# Patient Record
Sex: MALE | Race: BLACK OR AFRICAN AMERICAN | NOT HISPANIC OR LATINO | Employment: FULL TIME | URBAN - METROPOLITAN AREA
[De-identification: names, ages, dates, MRNs, and addresses within clinical notes are randomized per-mention and may not be internally consistent; named-entity substitution may affect disease eponyms.]

---

## 2017-01-15 ENCOUNTER — HOSPITAL ENCOUNTER (EMERGENCY)
Facility: HOSPITAL | Age: 57
Discharge: HOME | End: 2017-01-15
Attending: EMERGENCY MEDICINE
Payer: COMMERCIAL

## 2017-01-15 VITALS
HEART RATE: 100 BPM | OXYGEN SATURATION: 99 % | SYSTOLIC BLOOD PRESSURE: 120 MMHG | HEIGHT: 66 IN | WEIGHT: 140 LBS | DIASTOLIC BLOOD PRESSURE: 92 MMHG | BODY MASS INDEX: 22.5 KG/M2 | RESPIRATION RATE: 16 BRPM | TEMPERATURE: 99 F

## 2017-01-15 DIAGNOSIS — S39.012A LUMBAR STRAIN, INITIAL ENCOUNTER: Primary | ICD-10-CM

## 2017-01-15 PROCEDURE — 99283 EMERGENCY DEPT VISIT LOW MDM: CPT

## 2017-01-15 RX ORDER — OXYCODONE AND ACETAMINOPHEN 5; 325 MG/1; MG/1
1 TABLET ORAL EVERY 4 HOURS PRN
Qty: 12 TABLET | Refills: 0 | Status: ON HOLD | OUTPATIENT
Start: 2017-01-15 | End: 2018-09-24 | Stop reason: HOSPADM

## 2017-01-15 RX ORDER — LIDOCAINE 50 MG/G
1 PATCH TOPICAL DAILY
Qty: 15 PATCH | Refills: 0 | Status: SHIPPED | OUTPATIENT
Start: 2017-01-15 | End: 2018-10-19 | Stop reason: ALTCHOICE

## 2017-01-15 RX ORDER — METHOCARBAMOL 750 MG/1
1500 TABLET, FILM COATED ORAL 3 TIMES DAILY
Qty: 30 TABLET | Refills: 0 | Status: SHIPPED | OUTPATIENT
Start: 2017-01-15 | End: 2017-01-25

## 2017-01-15 NOTE — ED TRIAGE NOTES
Heavy lifting at work on Thursday. Has had lower back pain since.  Difficulty with movement, increases pain

## 2017-01-15 NOTE — ED AVS SNAPSHOT
[facility]  [place] [address]               Duran Mccauley   1/15/2017 12:43 PM   ED    Description:  Male : 1960   Department:  [facility]           Your Care was Coordinated By:     Provider Role From To    Cesar Villa MD Attending Provider 01/15/17 1248 --      Reason for Visit     Back Pain           Diagnoses this Visit        Comments    Lumbar strain, initial encounter    -  Primary       ED Disposition     ED Disposition Condition Comment    Discharge             To Do List           Follow-up Information     Follow up with Patrick Cook MD In 1 week(s).    Specialty:  Internal Medicine    Contact information:    [address] [facility] [address]  [phone]         These Medications        Disp Refills Start End    methocarbamol (ROBAXIN) 750 MG Tab 30 tablet 0 1/15/2017 2017    Take 2 tablets (1,500 mg total) by mouth 3 (three) times daily. - Oral    Pharmacy: [facility] [address] [place], [place] [address] - [facility] Ph #: [phone]       oxycodone-acetaminophen (PERCOCET) 5-325 mg per tablet 12 tablet 0 1/15/2017     Take 1 tablet by mouth every 4 (four) hours as needed for Pain. - Oral    Pharmacy: [facility] [address] [place], [place] [address] - [facility] Ph #: [phone]       lidocaine (LIDODERM) 5 % 15 patch 0 1/15/2017     Place 1 patch onto the skin once daily. Remove & Discard patch within 12 hours or as directed by MD - Transdermal    Pharmacy: [facility] [address] [place], [place] [address] - [address] [employer] [facility] Ph #: [phone]         [facility]     [facility] -  Assistance  Registered nurses in the [facility] provide clinical advisement, health education, appointment booking, and other advisory services.  Call for this free  "service at [phone].             Medications           Message regarding Medications     Verify the changes and/or additions to your medication regime listed below are the same as discussed with your clinician today.  If any of these changes or additions are incorrect, please notify your healthcare provider.        START taking these NEW medications        Refills    methocarbamol (ROBAXIN) 750 MG Tab 0    Sig: Take 2 tablets (1,500 mg total) by mouth 3 (three) times daily.    Class: Print    Route: Oral    oxycodone-acetaminophen (PERCOCET) 5-325 mg per tablet 0    Sig: Take 1 tablet by mouth every 4 (four) hours as needed for Pain.    Class: Print    Route: Oral    lidocaine (LIDODERM) 5 % 0    Sig: Place 1 patch onto the skin once daily. Remove & Discard patch within 12 hours or as directed by MD    Class: Print    Route: Transdermal           Verify that the below list of medications is an accurate representation of the medications you are currently taking.  If none reported, the list may be blank. If incorrect, please contact your healthcare provider. Carry this list with you in case of emergency.           Current Medications     atorvastatin (LIPITOR) 20 MG tablet Take 1 tablet (20 mg total) by mouth once daily.    lisinopril-hydrochlorothiazide (PRINZIDE,ZESTORETIC) 20-12.5 mg per tablet Take 1 tablet by mouth once daily.    lidocaine (LIDODERM) 5 % Place 1 patch onto the skin once daily. Remove & Discard patch within 12 hours or as directed by MD    methocarbamol (ROBAXIN) 750 MG Tab Take 2 tablets (1,500 mg total) by mouth 3 (three) times daily.    oxycodone-acetaminophen (PERCOCET) 5-325 mg per tablet Take 1 tablet by mouth every 4 (four) hours as needed for Pain.           Clinical Reference Information           Your Vitals Were     BP Pulse Temp Resp Height Weight    120/92 (BP Location: Left arm, Patient Position: Sitting) 100 98.5 °F (36.9 °C) (Oral) 16 5' 6" (1.676 m) 63.5 kg (140 lb)    SpO2 " BMI             99% 22.6 kg/m2         Allergies as of 1/15/2017     No Known Allergies      Immunizations Administered on Date of Encounter - 1/15/2017     None      ED Micro, Lab, POCT     None      ED Imaging Orders     None        Discharge Instructions           Back Sprain or Strain    Injury to the muscles (strain) or ligaments (sprain) around the spine can be troubling. Injury may occur after a sudden forceful twisting or bending force such as in a car accident, after a simple awkward movement, or after lifting something heavy with poor body positioning. In any case, muscle spasm is often present and adds to the pain.  Thankfully, most people feel better in 1 to 2 weeks, and most of the rest in 1 to 2 months. Most people can remain active. Unless you had a forceful or traumatic physical injury such as a car accident or fall, X-rays may not be ordered for the first evaluation of a back sprain or strain. If pain continues and does not respond to medical treatment, your healthcare provider may then order X-rays and other tests.  Home care  The following guidelines will help you care for your injury at home:  · When in bed, try to find a comfortable position. A firm mattress is best. Try lying flat on your back with pillows under your knees. You can also try lying on your side with your knees bent up toward your chest and a pillow between your knees.  · Don't sit for long periods. Try not to take long car rides or take other trips that have you sitting for a long time. This puts more stress on the lower back than standing or walking.  · During the first 24 to 72 hours after an injury or flare-up, apply an ice pack to the painful area for 20 minutes. Then remove it for 20 minutes. Do this for 60 to 90 minutes, or several times a day. This will reduce swelling and pain. Be sure to wrap the ice pack in a thin towel or plastic to protect your skin.  · You can start with ice, then switch to heat. Heat from a hot  shower, hot bath, or heating pad reduces pain and works well for muscle spasms. Put heat on the painful area for 20 minutes, then remove for 20 minutes. Do this for 60 to 90 minutes, or several times a day. Do not use a heating pad while sleeping. It can burn the skin.  · You can alternate the ice and heat. Talk with your healthcare provider to find out the best treatment or therapy for your back pain.  · Therapeutic massage will help relax the back muscles without stretching them.  · Be aware of safe lifting methods. Do not lift anything over 15 pounds until all of the pain is gone.  Medicines  Talk to your healthcare provider before using medicines, especially if you have other health problems or are taking other medicines.  · You may use acetaminophen or ibuprofen to control pain, unless another pain medicine was prescribed. If you have chronic conditions like diabetes, liver or kidney disease, stomach ulcers, or gastrointestinal bleeding, or are taking blood-thinner medicines, talk with your doctor before taking any medicines.  · Be careful if you are given prescription medicines, narcotics, or medicine for muscle spasm. They can cause drowsiness, and affect your coordination, reflexes, and judgment. Do not drive or operate heavy machinery when taking these types of medicines. Only take pain medicine as prescribed by your healthcare provider.  Follow-up care  Follow up with your healthcare provider, or as advised. You may need physical therapy or more tests if your symptoms get worse.  If you had X-rays your healthcare provider may be checking for any broken bones, breaks, or fractures. Bruises and sprains can sometimes hurt as much as a fracture. These injuries can take time to heal completely. If your symptoms dont improve or they get worse, talk with your healthcare provider. You may need a repeat X-ray or other tests.  Call 911  Call for emergency care if any of the following occur:  · Trouble  breathing  · Confused  · Very drowsy or trouble awakening  · Fainting or loss of consciousness  · Rapid or very slow heart rate  · Loss of bowel or bladder control  When to seek medical advice  Call your healthcare provider right away if any of the following occur:  · Pain gets worse or spreads to your arms or legs  · Weakness or numbness in one or both arms or legs  · Numbness in the groin or genital area  © 5596-0543 [employer] [address] [place] [address], [place] [address]. All rights reserved. This information is not intended as a substitute for professional medical care. Always follow your healthcare professional's instructions.          Self-Care for Low Back Pain    Most people have low back pain now and then. In many cases, it isnt serious and self-care can help. Sometimes low back pain can be a sign of a bigger problem. Call your healthcare provider if your pain returns often or gets worse over time. For the long-term care of your back, get regular exercise, lose any excess weight and learn good posture.  Take a short rest  Lying down during the day may be beneficial for short periods of time if severe pain increases with sitting or standing. Long-term bed rest could be detrimental.  Reduce pain and swelling  Cold reduces swelling. Both cold and heat can reduce pain. Protect your skin by placing a towel between your body and the ice or heat source.  · For the first few days, apply an ice pack for 15 to 20 minutes .  · After the first few days, try heat for 15 minutes at a time to ease pain. Never sleep on a heating pad.  · Over-the-counter medicine can help control pain and swelling. Try aspirin or ibuprofen.  Exercise  Exercise can help your back heal. It also helps your back get stronger and more flexible, preventing any reinjury. Ask your healthcare provider about specific exercises for your back.  Use good posture to avoid reinjury  · When moving, bend at the hips and knees. Dont bend at  the waist or twist around.  · When lifting, keep the object close to your body. Dont try to lift more than you can handle.  · When sitting, keep your lower back supported. Use a rolled-up towel as needed.  Seek immediate medical care if:  · Youre unable to stand or walk.  · You have a temperature over 100.4°F (38.0°C)  · You have frequent, painful, or bloody urination.  · You have severe abdominal pain.  · You have a sharp, stabbing pain.  · Your pain is constant.  · You have pain or numbness in your leg.  · You feel pain in a new area of your back.  · You notice that the pain isnt decreasing after more than a week.   © 7218-8736 [employer]. [address] [place] [address], [place] [address]. All rights reserved. This information is not intended as a substitute for professional medical care. Always follow your healthcare professional's instructions.           [facility] complies with applicable Federal civil rights laws and does not discriminate on the basis of race, color, national origin, age, disability, or sex.        Language Assistance Services     ATTENTION: Language assistance services are available, free of charge. Please call [phone].      ATENCIÓN: Si habla rejiañol, tiene a ambriz disposición servicios gratuitos de asistencia lingüística. Llame al [phone].     LEONORA Ý: N?u b?n nói Ti?ng Vi?t, có các d?ch v? h? tr? ngôn ng? mi?n phí dành cho b?n. G?i s? [phone].

## 2017-01-15 NOTE — ED PROVIDER NOTES
Encounter Date: 1/15/2017    SCRIBE #1 NOTE: I, Kehinde Oden, am scribing for, and in the presence of,  Larry Villa MD. I have scribed the following portions of the note - Other sections scribed: ROS, HPI.       History     Chief Complaint   Patient presents with    Back Pain     lower back pain onset thursday, lifting heavy objects on Thurs (wore back brace), denies hx of back problems     Review of patient's allergies indicates:  No Known Allergies  HPI Comments: CC: Back pain    HPI: This 56 y.o. M, who has a past medical history of Eczema; Hepatitis B; Hyperlipidemia; and Hypertension, presents to the ED for evaluation of gradually developing lower back pain with associated lower back stiffness x3 days. Pain is 10/10 and worse with movement. He has not been getting relief with Ibuprofen. He reports he was lifting heavy flower pots and bags of concrete at work 4 days ago. He is concerned this caused his symptoms. He denies incontinence, numbness, weakness, nausea, vomiting, fever and neck pain.  The history is provided by the patient.     Past Medical History   Diagnosis Date    Eczema     Hepatitis B      treated 2010s and negative RNA load afterward    Hyperlipidemia     Hypertension      No past medical history pertinent negatives.  Past Surgical History   Procedure Laterality Date    No past surgeries       Family History   Problem Relation Age of Onset    Heart disease Father     Diabetes Brother      Social History   Substance Use Topics    Smoking status: Never Smoker    Smokeless tobacco: None    Alcohol use 0.6 oz/week     1 Cans of beer per week     Review of Systems   Constitutional: Negative for chills, diaphoresis, fatigue, fever and unexpected weight change.   Respiratory: Negative for chest tightness.    Cardiovascular: Negative for chest pain.   Gastrointestinal: Negative for abdominal pain, nausea and vomiting.        (-) Bowel incontinence   Genitourinary: Negative for  enuresis.   Musculoskeletal: Positive for back pain (lower; with associated stiffness). Negative for neck pain.   Neurological: Negative for speech difficulty, weakness, numbness and headaches.   Hematological: Does not bruise/bleed easily.       Physical Exam   Initial Vitals   BP Pulse Resp Temp SpO2   01/15/17 1139 01/15/17 1139 01/15/17 1139 01/15/17 1139 01/15/17 1139   120/92 100 16 98.5 °F (36.9 °C) 99 %     Physical Exam    Vitals reviewed.  Constitutional: He appears well-developed and well-nourished.   HENT:   Head: Normocephalic and atraumatic.   Eyes: EOM are normal. Pupils are equal, round, and reactive to light.   Neck: Normal range of motion. Neck supple.   Cardiovascular: Normal rate, regular rhythm, normal heart sounds and intact distal pulses.   Pulmonary/Chest: Breath sounds normal. No respiratory distress. He has no wheezes. He has no rhonchi. He has no rales.   Abdominal: Soft. Bowel sounds are normal.   Musculoskeletal:        Lumbar back: He exhibits decreased range of motion, tenderness and pain. He exhibits no bony tenderness and no swelling.   Positive straight leg raise on the left at around 30°.  Negative on the right.  Mild tenderness to the paraspinous muscles on the left-hand side.   Neurological: He is alert and oriented to person, place, and time. No cranial nerve deficit or sensory deficit. GCS eye subscore is 4. GCS verbal subscore is 5. GCS motor subscore is 6.   No motor sensory deficits.  No saddle anesthesia.   Skin: Skin is warm and dry.   Psychiatric: He has a normal mood and affect.         ED Course   Procedures  Labs Reviewed - No data to display         Medical decision-making:    The patient received a medical screening exam. If performed, the EKG was independently evaluated by me and is pending final cardiology evaluation.  If performed, all radiographic studies were independently evaluated by me and are pending final radiology evaluation. If labs were ordered, they  were reviewed. Vital signs are independently assessed by me.  If performed, the pulse oximetry was independently evaluated by me.  I decided to obtain the patient's past medical record.  If available, I reviewed the patient's past medical record, including most recent labs and radiology reports.    I decided to obtain and review the old medical record.  The is an emergent evaluation for a patient with low back pain. The patient has no history of major trauma.  The patients age is not greater than 70 or less than 20 years old. I do not suspect an infectious, cancerous, or vascular etiology as the cause of the low back pain. The patient does not have a history of cancer or unexplained weight loss suggesting metastatic disease.  The patient does not have persistent fevers or night sweats. The patient is not immunocompromised and the patient has not had any chronic steroid use or intravenous drug use.  I do not think this patient has an epidural abscess, osteomyelitis, or metastatic spine lesions.  Pt does not have a history of  aortic aneurysm and I do not think there is evidence of retroperitoneal rupture.  The patient has a normal neurological exam and does not show evidence of cord or root compression.  The patient does not exhibit signs of urinary retention, urinary incontinence, bowel incontinence, or saddle anesthesia.  There is no evidence of cauda equine syndrome.     I do not think emergent radiography with X-rays or CT scan is warranted at this  time. Outpatient imaging can be obtained at the discretion of the primary care Physician.    I will offer this patient symptomatic treatment, reassurance, and education.      My physical exam findings were reviewed with the patient. Pt agrees with assessment, disposition and treatment plan and has no further questions or complaints at this time.    RORY Villa M.D. 1:01 PM 1/15/2017                   Jean Attestation:   Nahidibehsan #1: I performed the above  scribed service and the documentation accurately describes the services I performed. I attest to the accuracy of the note.    Attending Attestation:           Physician Attestation for Scribe:  Physician Attestation Statement for Scribe #1: I, Larry Villa MD, reviewed documentation, as scribed by Kehinde Oden in my presence, and it is both accurate and complete.                 ED Course     Clinical Impression:   The encounter diagnosis was Lumbar strain, initial encounter.          Cesar Villa MD  01/15/17 9427

## 2017-01-15 NOTE — DISCHARGE INSTRUCTIONS
Back Sprain or Strain    Injury to the muscles (strain) or ligaments (sprain) around the spine can be troubling. Injury may occur after a sudden forceful twisting or bending force such as in a car accident, after a simple awkward movement, or after lifting something heavy with poor body positioning. In any case, muscle spasm is often present and adds to the pain.  Thankfully, most people feel better in 1 to 2 weeks, and most of the rest in 1 to 2 months. Most people can remain active. Unless you had a forceful or traumatic physical injury such as a car accident or fall, X-rays may not be ordered for the first evaluation of a back sprain or strain. If pain continues and does not respond to medical treatment, your healthcare provider may then order X-rays and other tests.  Home care  The following guidelines will help you care for your injury at home:  · When in bed, try to find a comfortable position. A firm mattress is best. Try lying flat on your back with pillows under your knees. You can also try lying on your side with your knees bent up toward your chest and a pillow between your knees.  · Don't sit for long periods. Try not to take long car rides or take other trips that have you sitting for a long time. This puts more stress on the lower back than standing or walking.  · During the first 24 to 72 hours after an injury or flare-up, apply an ice pack to the painful area for 20 minutes. Then remove it for 20 minutes. Do this for 60 to 90 minutes, or several times a day. This will reduce swelling and pain. Be sure to wrap the ice pack in a thin towel or plastic to protect your skin.  · You can start with ice, then switch to heat. Heat from a hot shower, hot bath, or heating pad reduces pain and works well for muscle spasms. Put heat on the painful area for 20 minutes, then remove for 20 minutes. Do this for 60 to 90 minutes, or several times a day. Do not use a heating pad while sleeping. It can burn the  skin.  · You can alternate the ice and heat. Talk with your healthcare provider to find out the best treatment or therapy for your back pain.  · Therapeutic massage will help relax the back muscles without stretching them.  · Be aware of safe lifting methods. Do not lift anything over 15 pounds until all of the pain is gone.  Medicines  Talk to your healthcare provider before using medicines, especially if you have other health problems or are taking other medicines.  · You may use acetaminophen or ibuprofen to control pain, unless another pain medicine was prescribed. If you have chronic conditions like diabetes, liver or kidney disease, stomach ulcers, or gastrointestinal bleeding, or are taking blood-thinner medicines, talk with your doctor before taking any medicines.  · Be careful if you are given prescription medicines, narcotics, or medicine for muscle spasm. They can cause drowsiness, and affect your coordination, reflexes, and judgment. Do not drive or operate heavy machinery when taking these types of medicines. Only take pain medicine as prescribed by your healthcare provider.  Follow-up care  Follow up with your healthcare provider, or as advised. You may need physical therapy or more tests if your symptoms get worse.  If you had X-rays your healthcare provider may be checking for any broken bones, breaks, or fractures. Bruises and sprains can sometimes hurt as much as a fracture. These injuries can take time to heal completely. If your symptoms dont improve or they get worse, talk with your healthcare provider. You may need a repeat X-ray or other tests.  Call 911  Call for emergency care if any of the following occur:  · Trouble breathing  · Confused  · Very drowsy or trouble awakening  · Fainting or loss of consciousness  · Rapid or very slow heart rate  · Loss of bowel or bladder control  When to seek medical advice  Call your healthcare provider right away if any of the following occur:  · Pain  gets worse or spreads to your arms or legs  · Weakness or numbness in one or both arms or legs  · Numbness in the groin or genital area  © 1642-5207 [employer]. [address] [place] [address], [place] [address]. All rights reserved. This information is not intended as a substitute for professional medical care. Always follow your healthcare professional's instructions.          Self-Care for Low Back Pain    Most people have low back pain now and then. In many cases, it isnt serious and self-care can help. Sometimes low back pain can be a sign of a bigger problem. Call your healthcare provider if your pain returns often or gets worse over time. For the long-term care of your back, get regular exercise, lose any excess weight and learn good posture.  Take a short rest  Lying down during the day may be beneficial for short periods of time if severe pain increases with sitting or standing. Long-term bed rest could be detrimental.  Reduce pain and swelling  Cold reduces swelling. Both cold and heat can reduce pain. Protect your skin by placing a towel between your body and the ice or heat source.  · For the first few days, apply an ice pack for 15 to 20 minutes .  · After the first few days, try heat for 15 minutes at a time to ease pain. Never sleep on a heating pad.  · Over-the-counter medicine can help control pain and swelling. Try aspirin or ibuprofen.  Exercise  Exercise can help your back heal. It also helps your back get stronger and more flexible, preventing any reinjury. Ask your healthcare provider about specific exercises for your back.  Use good posture to avoid reinjury  · When moving, bend at the hips and knees. Dont bend at the waist or twist around.  · When lifting, keep the object close to your body. Dont try to lift more than you can handle.  · When sitting, keep your lower back supported. Use a rolled-up towel as needed.  Seek immediate medical care if:  · Youre unable to stand or  walk.  · You have a temperature over 100.4°F (38.0°C)  · You have frequent, painful, or bloody urination.  · You have severe abdominal pain.  · You have a sharp, stabbing pain.  · Your pain is constant.  · You have pain or numbness in your leg.  · You feel pain in a new area of your back.  · You notice that the pain isnt decreasing after more than a week.   © 3802-8395 [employer]. [address] [place] [address], [place] [address]. All rights reserved. This information is not intended as a substitute for professional medical care. Always follow your healthcare professional's instructions.

## 2017-11-08 ENCOUNTER — LAB VISIT (OUTPATIENT)
Dept: LAB | Facility: HOSPITAL | Age: 57
End: 2017-11-08
Attending: INTERNAL MEDICINE
Payer: COMMERCIAL

## 2017-11-08 ENCOUNTER — OFFICE VISIT (OUTPATIENT)
Dept: FAMILY MEDICINE | Facility: CLINIC | Age: 57
End: 2017-11-08
Payer: COMMERCIAL

## 2017-11-08 VITALS
HEIGHT: 66 IN | TEMPERATURE: 98 F | WEIGHT: 135.38 LBS | OXYGEN SATURATION: 98 % | BODY MASS INDEX: 21.76 KG/M2 | HEART RATE: 79 BPM | SYSTOLIC BLOOD PRESSURE: 108 MMHG | DIASTOLIC BLOOD PRESSURE: 78 MMHG

## 2017-11-08 DIAGNOSIS — Z00.00 ROUTINE MEDICAL EXAM: ICD-10-CM

## 2017-11-08 DIAGNOSIS — E78.00 PURE HYPERCHOLESTEROLEMIA: ICD-10-CM

## 2017-11-08 DIAGNOSIS — Z00.00 ROUTINE MEDICAL EXAM: Primary | ICD-10-CM

## 2017-11-08 DIAGNOSIS — I10 ESSENTIAL (PRIMARY) HYPERTENSION: ICD-10-CM

## 2017-11-08 DIAGNOSIS — Z23 FLU VACCINE NEED: ICD-10-CM

## 2017-11-08 LAB
ALBUMIN SERPL BCP-MCNC: 3.9 G/DL
ALP SERPL-CCNC: 87 U/L
ALT SERPL W/O P-5'-P-CCNC: 15 U/L
ANION GAP SERPL CALC-SCNC: 6 MMOL/L
AST SERPL-CCNC: 20 U/L
BASOPHILS # BLD AUTO: 0.02 K/UL
BASOPHILS NFR BLD: 0.4 %
BILIRUB SERPL-MCNC: 0.7 MG/DL
BUN SERPL-MCNC: 20 MG/DL
CALCIUM SERPL-MCNC: 9.6 MG/DL
CHLORIDE SERPL-SCNC: 105 MMOL/L
CHOLEST SERPL-MCNC: 166 MG/DL
CHOLEST/HDLC SERPL: 3 {RATIO}
CO2 SERPL-SCNC: 27 MMOL/L
COMPLEXED PSA SERPL-MCNC: 0.77 NG/ML
CREAT SERPL-MCNC: 1.4 MG/DL
DIFFERENTIAL METHOD: ABNORMAL
EOSINOPHIL # BLD AUTO: 0.2 K/UL
EOSINOPHIL NFR BLD: 3.1 %
ERYTHROCYTE [DISTWIDTH] IN BLOOD BY AUTOMATED COUNT: 13.8 %
EST. GFR  (AFRICAN AMERICAN): >60 ML/MIN/1.73 M^2
EST. GFR  (NON AFRICAN AMERICAN): 55 ML/MIN/1.73 M^2
GLUCOSE SERPL-MCNC: 77 MG/DL
HCT VFR BLD AUTO: 39 %
HDLC SERPL-MCNC: 55 MG/DL
HDLC SERPL: 33.1 %
HGB BLD-MCNC: 12.9 G/DL
IMM GRANULOCYTES # BLD AUTO: 0.01 K/UL
IMM GRANULOCYTES NFR BLD AUTO: 0.2 %
LDLC SERPL CALC-MCNC: 77.6 MG/DL
LYMPHOCYTES # BLD AUTO: 1.4 K/UL
LYMPHOCYTES NFR BLD: 25 %
MCH RBC QN AUTO: 28.7 PG
MCHC RBC AUTO-ENTMCNC: 33.1 G/DL
MCV RBC AUTO: 87 FL
MONOCYTES # BLD AUTO: 0.4 K/UL
MONOCYTES NFR BLD: 7.9 %
NEUTROPHILS # BLD AUTO: 3.5 K/UL
NEUTROPHILS NFR BLD: 63.4 %
NONHDLC SERPL-MCNC: 111 MG/DL
NRBC BLD-RTO: 0 /100 WBC
PLATELET # BLD AUTO: 193 K/UL
PMV BLD AUTO: 11.1 FL
POTASSIUM SERPL-SCNC: 4.5 MMOL/L
PROT SERPL-MCNC: 7.9 G/DL
RBC # BLD AUTO: 4.49 M/UL
SODIUM SERPL-SCNC: 138 MMOL/L
TRIGL SERPL-MCNC: 167 MG/DL
WBC # BLD AUTO: 5.55 K/UL

## 2017-11-08 PROCEDURE — 90471 IMMUNIZATION ADMIN: CPT | Mod: S$GLB,,, | Performed by: INTERNAL MEDICINE

## 2017-11-08 PROCEDURE — 84153 ASSAY OF PSA TOTAL: CPT

## 2017-11-08 PROCEDURE — 99999 PR PBB SHADOW E&M-EST. PATIENT-LVL III: CPT | Mod: PBBFAC,,, | Performed by: INTERNAL MEDICINE

## 2017-11-08 PROCEDURE — 36415 COLL VENOUS BLD VENIPUNCTURE: CPT | Mod: PO

## 2017-11-08 PROCEDURE — 90686 IIV4 VACC NO PRSV 0.5 ML IM: CPT | Mod: S$GLB,,, | Performed by: INTERNAL MEDICINE

## 2017-11-08 PROCEDURE — 99396 PREV VISIT EST AGE 40-64: CPT | Mod: 25,S$GLB,, | Performed by: INTERNAL MEDICINE

## 2017-11-08 PROCEDURE — 80061 LIPID PANEL: CPT

## 2017-11-08 PROCEDURE — 80053 COMPREHEN METABOLIC PANEL: CPT

## 2017-11-08 PROCEDURE — 85025 COMPLETE CBC W/AUTO DIFF WBC: CPT

## 2017-11-08 RX ORDER — ATORVASTATIN CALCIUM 20 MG/1
20 TABLET, FILM COATED ORAL DAILY
Qty: 90 TABLET | Refills: 3 | Status: SHIPPED | OUTPATIENT
Start: 2017-11-08 | End: 2019-02-06 | Stop reason: SDUPTHER

## 2017-11-08 RX ORDER — HYDROCHLOROTHIAZIDE 12.5 MG/1
12.5 TABLET ORAL DAILY
Qty: 90 TABLET | Refills: 3 | Status: ON HOLD | OUTPATIENT
Start: 2017-11-08 | End: 2018-09-24 | Stop reason: HOSPADM

## 2017-11-08 NOTE — PROGRESS NOTES
Your lab results are normal.  Please continue your current medications and doses.  Please feel free to contact me with any questions or concerns.    Sincerely,  Patrick Cook  [email or web address]

## 2017-11-17 ENCOUNTER — PATIENT MESSAGE (OUTPATIENT)
Dept: FAMILY MEDICINE | Facility: CLINIC | Age: 57
End: 2017-11-17

## 2017-12-01 ENCOUNTER — TELEPHONE (OUTPATIENT)
Dept: FAMILY MEDICINE | Facility: CLINIC | Age: 57
End: 2017-12-01

## 2017-12-01 NOTE — TELEPHONE ENCOUNTER
Please call the patient regarding this [employer] Message I sent him on 11/17/17.  It appears that it has not been read.      Mr. Mccauley,     I just wanted to let you know that I have not sent your paperwork in.  The urine test for the nicotine that they are requiring you to do has not resulted.  Did you give a urine sample?       Thank you,  Michael

## 2017-12-02 ENCOUNTER — PATIENT MESSAGE (OUTPATIENT)
Dept: FAMILY MEDICINE | Facility: CLINIC | Age: 57
End: 2017-12-02

## 2017-12-06 ENCOUNTER — CLINICAL SUPPORT (OUTPATIENT)
Dept: FAMILY MEDICINE | Facility: CLINIC | Age: 57
End: 2017-12-06
Payer: COMMERCIAL

## 2017-12-06 VITALS — DIASTOLIC BLOOD PRESSURE: 80 MMHG | HEART RATE: 78 BPM | SYSTOLIC BLOOD PRESSURE: 122 MMHG

## 2017-12-06 DIAGNOSIS — I10 BENIGN ESSENTIAL HTN: Primary | ICD-10-CM

## 2017-12-06 PROCEDURE — 99499 UNLISTED E&M SERVICE: CPT | Mod: S$GLB,,, | Performed by: INTERNAL MEDICINE

## 2017-12-06 PROCEDURE — 99999 PR PBB SHADOW E&M-EST. PATIENT-LVL II: CPT | Mod: PBBFAC,,,

## 2017-12-06 NOTE — PROGRESS NOTES
Duran Mccauley 57 y.o. male is here today for Blood Pressure check.   History of HTN yes.    Review of patient's allergies indicates:  No Known Allergies  Creatinine   Date Value Ref Range Status   11/08/2017 1.4 0.5 - 1.4 mg/dL Final     Sodium   Date Value Ref Range Status   11/08/2017 138 136 - 145 mmol/L Final     Potassium   Date Value Ref Range Status   11/08/2017 4.5 3.5 - 5.1 mmol/L Final   ]  Patient verifies taking blood pressure medications on a regular basis at the same time of the day.     Current Outpatient Prescriptions:     atorvastatin (LIPITOR) 20 MG tablet, Take 1 tablet (20 mg total) by mouth once daily., Disp: 90 tablet, Rfl: 3    hydroCHLOROthiazide (HYDRODIURIL) 12.5 MG Tab, Take 1 tablet (12.5 mg total) by mouth once daily., Disp: 90 tablet, Rfl: 3    lidocaine (LIDODERM) 5 %, Place 1 patch onto the skin once daily. Remove & Discard patch within 12 hours or as directed by MD, Disp: 15 patch, Rfl: 0    oxycodone-acetaminophen (PERCOCET) 5-325 mg per tablet, Take 1 tablet by mouth every 4 (four) hours as needed for Pain., Disp: 12 tablet, Rfl: 0  Does patient have record of home blood pressure readings no..   Last dose of blood pressure medication was taken at 5:00 am this morning.  Patient is asymptomatic.   Complains of none. Patient's machine blood presssure 149/90 pulse 70.    Vitals:    12/06/17 0717 12/06/17 0733   BP: (!) 140/80 122/80   BP Location: Right arm Right arm   Patient Position: Sitting Sitting   BP Method: Small (Manual) Small (Manual)   Pulse: 71 78         Dr. Cook notified.

## 2017-12-19 DIAGNOSIS — E78.00 PURE HYPERCHOLESTEROLEMIA: ICD-10-CM

## 2017-12-19 RX ORDER — ATORVASTATIN CALCIUM 20 MG/1
TABLET, FILM COATED ORAL
Qty: 90 TABLET | Refills: 3 | Status: ON HOLD | OUTPATIENT
Start: 2017-12-19 | End: 2018-09-24 | Stop reason: HOSPADM

## 2018-01-16 ENCOUNTER — PATIENT MESSAGE (OUTPATIENT)
Dept: FAMILY MEDICINE | Facility: CLINIC | Age: 58
End: 2018-01-16

## 2018-01-16 RX ORDER — INDOMETHACIN 50 MG/1
50 CAPSULE ORAL 3 TIMES DAILY PRN
Qty: 60 CAPSULE | Refills: 0 | Status: SHIPPED | OUTPATIENT
Start: 2018-01-16 | End: 2018-01-17 | Stop reason: SDUPTHER

## 2018-01-17 RX ORDER — INDOMETHACIN 50 MG/1
CAPSULE ORAL
Qty: 270 CAPSULE | Refills: 0 | Status: SHIPPED | OUTPATIENT
Start: 2018-01-17 | End: 2018-07-02 | Stop reason: SDUPTHER

## 2018-01-18 ENCOUNTER — OFFICE VISIT (OUTPATIENT)
Dept: FAMILY MEDICINE | Facility: CLINIC | Age: 58
End: 2018-01-18
Payer: COMMERCIAL

## 2018-01-18 VITALS
DIASTOLIC BLOOD PRESSURE: 100 MMHG | WEIGHT: 134.69 LBS | TEMPERATURE: 99 F | HEART RATE: 96 BPM | SYSTOLIC BLOOD PRESSURE: 130 MMHG | HEIGHT: 66 IN | BODY MASS INDEX: 21.64 KG/M2 | OXYGEN SATURATION: 99 %

## 2018-01-18 DIAGNOSIS — M10.9 GOUT, UNSPECIFIED CAUSE, UNSPECIFIED CHRONICITY, UNSPECIFIED SITE: Primary | ICD-10-CM

## 2018-01-18 PROCEDURE — 99999 PR PBB SHADOW E&M-EST. PATIENT-LVL IV: CPT | Mod: PBBFAC,,, | Performed by: NURSE PRACTITIONER

## 2018-01-18 PROCEDURE — 99214 OFFICE O/P EST MOD 30 MIN: CPT | Mod: S$GLB,,, | Performed by: NURSE PRACTITIONER

## 2018-01-18 RX ORDER — METHYLPREDNISOLONE 4 MG/1
TABLET ORAL
Qty: 1 PACKAGE | Refills: 0 | Status: SHIPPED | OUTPATIENT
Start: 2018-01-18 | End: 2018-04-27 | Stop reason: SDUPTHER

## 2018-01-18 RX ORDER — TRAMADOL HYDROCHLORIDE 50 MG/1
50 TABLET ORAL EVERY 6 HOURS PRN
Qty: 15 TABLET | Refills: 0 | Status: SHIPPED | OUTPATIENT
Start: 2018-01-18 | End: 2018-01-28

## 2018-01-18 NOTE — PROGRESS NOTES
This dictation has been generated using Dragon Dictation some phonetic errors may occur.     Duran was seen today for gout.    Diagnoses and all orders for this visit:    Gout, unspecified cause, unspecified chronicity, unspecified site  -     methylPREDNISolone (MEDROL DOSEPACK) 4 mg tablet; use as directed    Other orders  -     traMADol (ULTRAM) 50 mg tablet; Take 1 tablet (50 mg total) by mouth every 6 (six) hours as needed for Pain.      Patient Instructions   Tylenol for pain.      Gout.  Indocin.  Start Medrol as above.  Tramadol for breakthrough pain if Tylenol not effective.  Discussed increasing fluids.  Discussed moderate protein intake.  May consider changing hydrochlorothiazide in the future.    No Follow-up on file.  ________________________________________________________________  ________________________________________________________________        Chief Complaint   Patient presents with    Gout     History of present illness  This 57 y.o. presents today for complaint of gout.  Flare up of symptoms on Sunday.  Symptoms are present in the right hand.  He is right-hand dominant.  He has not been to work this week due to the pain.  He started taking Indocin 3 times a day.  He notes that he has had some improvement but it is not resolved.  He has right hand and wrist pain.  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  No history of trauma  Review of systems  No fever or chills  No other joint pains  No chest pain or shortness of breath  Past medical and social history reviewed.  Patient is new to me.  Follows with one of my partners      Past Medical History:   Diagnosis Date    Eczema     Hepatitis B     treated 2010s and negative RNA load afterward    Hyperlipidemia     Hypertension        Past Surgical History:   Procedure Laterality Date    NO PAST SURGERIES         Family History   Problem Relation Age of Onset    Heart disease Father     Diabetes Brother        Social History     Social History     Marital status:      Spouse name: N/A    Number of children: N/A    Years of education: N/A     Social History Main Topics    Smoking status: Never Smoker    Smokeless tobacco: Never Used    Alcohol use 0.6 oz/week     1 Cans of beer per week    Drug use: No    Sexual activity: Yes     Partners: Female     Other Topics Concern    None     Social History Narrative    None       Current Outpatient Prescriptions   Medication Sig Dispense Refill    atorvastatin (LIPITOR) 20 MG tablet Take 1 tablet (20 mg total) by mouth once daily. 90 tablet 3    atorvastatin (LIPITOR) 20 MG tablet TAKE 1 TABLET(20 MG) BY MOUTH EVERY DAY 90 tablet 3    hydroCHLOROthiazide (HYDRODIURIL) 12.5 MG Tab Take 1 tablet (12.5 mg total) by mouth once daily. 90 tablet 3    indomethacin (INDOCIN) 50 MG capsule TAKE 1 CAPSULE(50 MG) BY MOUTH THREE TIMES DAILY AS NEEDED FOR GOUT FLARE. STOP ONCE SYMPTOMS IMPROVE 270 capsule 0    lidocaine (LIDODERM) 5 % Place 1 patch onto the skin once daily. Remove & Discard patch within 12 hours or as directed by MD 15 patch 0    oxycodone-acetaminophen (PERCOCET) 5-325 mg per tablet Take 1 tablet by mouth every 4 (four) hours as needed for Pain. 12 tablet 0    methylPREDNISolone (MEDROL DOSEPACK) 4 mg tablet use as directed 1 Package 0    traMADol (ULTRAM) 50 mg tablet Take 1 tablet (50 mg total) by mouth every 6 (six) hours as needed for Pain. 15 tablet 0     No current facility-administered medications for this visit.        Review of patient's allergies indicates:  No Known Allergies    Physical examination  Vitals Reviewed  Gen. Well-dressed well-nourished mild distress due to wrist pain.  Neuro. Awake alert oriented x4.  Normal judgment and cognition noted.  Extremities no clubbing cyanosis or edema noted.  Swelling and localized warmth noted back of the right hand.  There is some color changes observed.  Warmth palpable and tenderness to palpation.  Decreased range of motion due to  pain.  No bruising observed.    Call or return to clinic prn if these symptoms worsen or fail to improve as anticipated.

## 2018-04-27 ENCOUNTER — HOSPITAL ENCOUNTER (OUTPATIENT)
Dept: RADIOLOGY | Facility: HOSPITAL | Age: 58
Discharge: HOME | End: 2018-04-27
Attending: NURSE PRACTITIONER
Payer: COMMERCIAL

## 2018-04-27 ENCOUNTER — OFFICE VISIT (OUTPATIENT)
Dept: FAMILY MEDICINE | Facility: CLINIC | Age: 58
End: 2018-04-27
Payer: COMMERCIAL

## 2018-04-27 VITALS
OXYGEN SATURATION: 98 % | HEIGHT: 66 IN | TEMPERATURE: 98 F | WEIGHT: 136.44 LBS | BODY MASS INDEX: 21.93 KG/M2 | HEART RATE: 90 BPM | DIASTOLIC BLOOD PRESSURE: 60 MMHG | SYSTOLIC BLOOD PRESSURE: 168 MMHG

## 2018-04-27 DIAGNOSIS — M10.9 GOUT, UNSPECIFIED CAUSE, UNSPECIFIED CHRONICITY, UNSPECIFIED SITE: ICD-10-CM

## 2018-04-27 DIAGNOSIS — M25.462 KNEE EFFUSION, LEFT: ICD-10-CM

## 2018-04-27 DIAGNOSIS — M25.462 KNEE EFFUSION, LEFT: Primary | ICD-10-CM

## 2018-04-27 PROCEDURE — 3078F DIAST BP <80 MM HG: CPT | Mod: CPTII,S$GLB,, | Performed by: NURSE PRACTITIONER

## 2018-04-27 PROCEDURE — 73560 X-RAY EXAM OF KNEE 1 OR 2: CPT | Mod: 26,LT,, | Performed by: RADIOLOGY

## 2018-04-27 PROCEDURE — 99999 PR PBB SHADOW E&M-EST. PATIENT-LVL IV: CPT | Mod: PBBFAC,,, | Performed by: NURSE PRACTITIONER

## 2018-04-27 PROCEDURE — 3077F SYST BP >= 140 MM HG: CPT | Mod: CPTII,S$GLB,, | Performed by: NURSE PRACTITIONER

## 2018-04-27 PROCEDURE — 99214 OFFICE O/P EST MOD 30 MIN: CPT | Mod: SA,S$GLB,, | Performed by: NURSE PRACTITIONER

## 2018-04-27 PROCEDURE — 73560 X-RAY EXAM OF KNEE 1 OR 2: CPT | Mod: TC,FY,PO,LT

## 2018-04-27 RX ORDER — METHYLPREDNISOLONE 4 MG/1
TABLET ORAL
Qty: 1 PACKAGE | Refills: 0 | Status: ON HOLD | OUTPATIENT
Start: 2018-04-27 | End: 2018-09-24 | Stop reason: HOSPADM

## 2018-04-27 RX ORDER — IBUPROFEN 800 MG/1
800 TABLET ORAL DAILY
Status: ON HOLD | COMMUNITY
Start: 2018-04-25 | End: 2018-09-24 | Stop reason: HOSPADM

## 2018-04-27 NOTE — PROGRESS NOTES
This dictation has been generated using Dragon Dictation some phonetic errors may occur.     Problem List Items Addressed This Visit     None      Visit Diagnoses     Knee effusion, left    -  Primary    Relevant Medications    methylPREDNISolone (MEDROL DOSEPACK) 4 mg tablet    Other Relevant Orders    Ambulatory referral to Orthopedics    X-Ray Knee 1 or 2 View Left (Completed)    Gout, unspecified cause, unspecified chronicity, unspecified site        Relevant Medications    methylPREDNISolone (MEDROL DOSEPACK) 4 mg tablet        Orders Placed This Encounter    X-Ray Knee 1 or 2 View Left    Ambulatory referral to Orthopedics    methylPREDNISolone (MEDROL DOSEPACK) 4 mg tablet     Superior knee effusion. Medrol, ortho, xray. I reviewed the images and note trace effusion that coordinates with physical exam. No fracture.     Follow-up if symptoms worsen or fail to improve.    ________________________________________________________________  ________________________________________________________________      Chief Complaint   Patient presents with    Knee Pain     History of present illness  This 58 y.o. presents today for complaint of knee pain.  Patient rates pain as a 2 on a scale of 1-10.  He was on vacation recently and then worked for a couple of days.  On Tuesday he spent hours standing and cooking at home.  He went to bed without any pain.  He woke Wednesday morning with knee pain and went to the urgent care clinic.  He denies any history of trauma or injury.  He notes swelling above the knee.  He does have some very mild pain.  Denies any limits her range of motion.  Repeatedly denies history of trauma.  Repeatedly denies excessive bending or kneeling.  He hasn't taken anything for his symptoms other than meds prescribed by urgent care which is an anti-inflammatory.  He does have a history of gout but these symptoms are different.  Review of systems  Denies other joint pain or swelling.  No nausea  vomiting diarrhea.  No fever chills malaise  No rash    Past Medical History:   Diagnosis Date    Eczema     Hepatitis B     treated 2010s and negative RNA load afterward    Hyperlipidemia     Hypertension        Past Surgical History:   Procedure Laterality Date    NO PAST SURGERIES         Family History   Problem Relation Age of Onset    Heart disease Father     Diabetes Brother        Social History     Social History    Marital status:      Spouse name: N/A    Number of children: N/A    Years of education: N/A     Social History Main Topics    Smoking status: Never Smoker    Smokeless tobacco: Never Used    Alcohol use 0.6 oz/week     1 Cans of beer per week    Drug use: No    Sexual activity: Yes     Partners: Female     Other Topics Concern    None     Social History Narrative    None       Current Outpatient Prescriptions   Medication Sig Dispense Refill    atorvastatin (LIPITOR) 20 MG tablet Take 1 tablet (20 mg total) by mouth once daily. 90 tablet 3    atorvastatin (LIPITOR) 20 MG tablet TAKE 1 TABLET(20 MG) BY MOUTH EVERY DAY 90 tablet 3    hydroCHLOROthiazide (HYDRODIURIL) 12.5 MG Tab Take 1 tablet (12.5 mg total) by mouth once daily. 90 tablet 3    indomethacin (INDOCIN) 50 MG capsule TAKE 1 CAPSULE(50 MG) BY MOUTH THREE TIMES DAILY AS NEEDED FOR GOUT FLARE. STOP ONCE SYMPTOMS IMPROVE 270 capsule 0    lidocaine (LIDODERM) 5 % Place 1 patch onto the skin once daily. Remove & Discard patch within 12 hours or as directed by MD 15 patch 0    methylPREDNISolone (MEDROL DOSEPACK) 4 mg tablet use as directed 1 Package 0    oxycodone-acetaminophen (PERCOCET) 5-325 mg per tablet Take 1 tablet by mouth every 4 (four) hours as needed for Pain. 12 tablet 0    ibuprofen (ADVIL,MOTRIN) 800 MG tablet Take 800 mg by mouth once daily.       No current facility-administered medications for this visit.        Review of patient's allergies indicates:  No Known Allergies    Physical  examination  Vitals Reviewed  Gen. Well-dressed well-nourished no apparent distress  Skin warm dry and intact.  No rashes noted.  Chest.  Respirations are even unlabored.  Lungs are clear to auscultation.  Cardiac regular rate and rhythm.  No chest wall adenopathy noted.  Neuro. Awake alert oriented x4.  Normal judgment and cognition noted.  Extremities no clubbing cyanosis or edema noted.  Effusion noted above the right knee.  No joint line effusion noted.  No joint line tenderness with palpation.  He indicates some lateral knee pain but has no tenderness with palpation in the area.  Stable knee examination with negative anterior posterior drawer.  No pain with extension of the leg even with extension of the leg against resistance.  Range of motion able to bend greater than 90° and extend fully.    Call or return to clinic prn if these symptoms worsen or fail to improve as anticipated.

## 2018-05-01 ENCOUNTER — TELEPHONE (OUTPATIENT)
Dept: FAMILY MEDICINE | Facility: CLINIC | Age: 58
End: 2018-05-01

## 2018-05-01 NOTE — LETTER
May 3, 2018    Duran Mccauley  [address]  [place] [address]             [facility]  [address] [facility]  [place] [address]  Phone: [phone]  Fax: [phone] Dear Yrn Garrick:    I have been unable to reach you by phone for your appointment to Orthopedic .  Please call me at the clinic [phone] to book your appointment.      If you have any questions or concerns, please don't hesitate to call.    Sincerely,        Dedra Nelson MA

## 2018-07-02 RX ORDER — INDOMETHACIN 50 MG/1
50 CAPSULE ORAL 3 TIMES DAILY PRN
Qty: 270 CAPSULE | Refills: 0 | Status: ON HOLD | OUTPATIENT
Start: 2018-07-02 | End: 2018-09-24 | Stop reason: HOSPADM

## 2018-09-11 ENCOUNTER — OFFICE VISIT (OUTPATIENT)
Dept: FAMILY MEDICINE | Facility: CLINIC | Age: 58
End: 2018-09-11
Payer: COMMERCIAL

## 2018-09-11 ENCOUNTER — TELEPHONE (OUTPATIENT)
Dept: FAMILY MEDICINE | Facility: CLINIC | Age: 58
End: 2018-09-11

## 2018-09-11 VITALS
BODY MASS INDEX: 20.8 KG/M2 | DIASTOLIC BLOOD PRESSURE: 88 MMHG | OXYGEN SATURATION: 97 % | HEIGHT: 66 IN | TEMPERATURE: 98 F | SYSTOLIC BLOOD PRESSURE: 132 MMHG | HEART RATE: 90 BPM | WEIGHT: 129.44 LBS

## 2018-09-11 DIAGNOSIS — K92.1 FRANK BLOOD IN STOOL: Primary | ICD-10-CM

## 2018-09-11 DIAGNOSIS — K92.1: Primary | ICD-10-CM

## 2018-09-11 DIAGNOSIS — M10.9 ACUTE GOUT OF RIGHT WRIST, UNSPECIFIED CAUSE: ICD-10-CM

## 2018-09-11 DIAGNOSIS — D64.9 ANEMIA, UNSPECIFIED TYPE: ICD-10-CM

## 2018-09-11 PROCEDURE — 3079F DIAST BP 80-89 MM HG: CPT | Mod: CPTII,S$GLB,, | Performed by: NURSE PRACTITIONER

## 2018-09-11 PROCEDURE — 3075F SYST BP GE 130 - 139MM HG: CPT | Mod: CPTII,S$GLB,, | Performed by: NURSE PRACTITIONER

## 2018-09-11 PROCEDURE — 99999 PR PBB SHADOW E&M-EST. PATIENT-LVL III: CPT | Mod: PBBFAC,,, | Performed by: NURSE PRACTITIONER

## 2018-09-11 PROCEDURE — 99214 OFFICE O/P EST MOD 30 MIN: CPT | Mod: S$GLB,,, | Performed by: NURSE PRACTITIONER

## 2018-09-11 PROCEDURE — 3008F BODY MASS INDEX DOCD: CPT | Mod: CPTII,S$GLB,, | Performed by: NURSE PRACTITIONER

## 2018-09-11 RX ORDER — PREDNISONE 10 MG/1
TABLET ORAL
Qty: 10 TABLET | Refills: 0 | Status: ON HOLD | OUTPATIENT
Start: 2018-09-11 | End: 2018-09-24 | Stop reason: HOSPADM

## 2018-09-11 NOTE — PROGRESS NOTES
This dictation has been generated using [employer] Dictation some phonetic errors may occur. Please contact author for clarification if needed.     Problem List Items Addressed This Visit     None      Visit Diagnoses     Blood in stool, [place]    -  Primary    Relevant Orders    CBC auto differential    Comprehensive metabolic panel    Case request GI: COLONOSCOPY (Completed)    POCT Hemocult Stool X3    Acute gout of right wrist, unspecified cause            Orders Placed This Encounter    CBC auto differential    Comprehensive metabolic panel    POCT Hemocult Stool X3    predniSONE (DELTASONE) 10 MG tablet    Case request GI: COLONOSCOPY     [place] blood in stool.  Check CBC rule out anemia.  With fatigue and weakness check CMP evaluate electrolyte abnormalities.  Also may have accidentally taken spironolactone in place of hydrochlorothiazide evaluate potassium level.  Colonoscopy technically up-to-date but due next year.  With current blood in stool update colonoscopy.  Check Hemoccult as above.  I will review and address accordingly.  If marked anemia will escalate his care.  Gout right wrist.  Anti-inflammatory medicines.  Prednisone as above.    Follow-up in about 1 week (around 9/18/2018).    ________________________________________________________________  ________________________________________________________________      Chief Complaint   Patient presents with    Rectal Bleeding    Fatigue     History of present illness  This 58 y.o. presents today for complaint of blood in stool.  Patient notes blood in stool over the last week.  He denies any abdominal pain.  No pain with defecation.  Patient notes marked amount of blood.  He does note weight loss of 6 lb.  He denies any mucus in the stool.  No black stools.  Patient notes that he received a letter from the pharmacy stating that he may have had the incorrect blood pressure medicine.  He has a prescription for hydrochlorothiazide 12.5 mg and  may have received spironolactone.  Reassured patient that he had 1 fluid pill replaced with another and does not have any Ace or ARB so low risk of high potassium levels.  We will check his potassium level.  Patient notes right wrist pain.  Symptoms have been present for less than a week.  Denies a history of trauma.  He does have gout.    Review of systems  No fever or chills.  Patient does note fatigue.  He does note feeling weak.  No chest pain or shortness of breath  No nausea or vomiting.  Denies abdominal pain.    Past Medical History:   Diagnosis Date    Eczema     Hepatitis B     treated 2010s and negative RNA load afterward    Hyperlipidemia     Hypertension        Past Surgical History:   Procedure Laterality Date    NO PAST SURGERIES         Family History   Problem Relation Age of Onset    Heart disease Father     Diabetes Brother        Social History     Socioeconomic History    Marital status:      Spouse name: None    Number of children: None    Years of education: None    Highest education level: None   Social Needs    Financial resource strain: None    Food insecurity - worry: None    Food insecurity - inability: None    Transportation needs - medical: None    Transportation needs - non-medical: None   Occupational History    None   Tobacco Use    Smoking status: Never Smoker    Smokeless tobacco: Never Used   Substance and Sexual Activity    Alcohol use: Yes     Alcohol/week: 0.6 oz     Types: 1 Cans of beer per week    Drug use: No    Sexual activity: Yes     Partners: Female   Other Topics Concern    None   Social History Narrative    None       Current Outpatient Medications   Medication Sig Dispense Refill    atorvastatin (LIPITOR) 20 MG tablet Take 1 tablet (20 mg total) by mouth once daily. 90 tablet 3    atorvastatin (LIPITOR) 20 MG tablet TAKE 1 TABLET(20 MG) BY MOUTH EVERY DAY 90 tablet 3    hydroCHLOROthiazide (HYDRODIURIL) 12.5 MG Tab Take 1 tablet  (12.5 mg total) by mouth once daily. 90 tablet 3    ibuprofen (ADVIL,MOTRIN) 800 MG tablet Take 800 mg by mouth once daily.      indomethacin (INDOCIN) 50 MG capsule Take 1 capsule (50 mg total) by mouth 3 (three) times daily as needed (joint pain/gout flares). 270 capsule 0    lidocaine (LIDODERM) 5 % Place 1 patch onto the skin once daily. Remove & Discard patch within 12 hours or as directed by MD 15 patch 0    methylPREDNISolone (MEDROL DOSEPACK) 4 mg tablet use as directed 1 Package 0    oxycodone-acetaminophen (PERCOCET) 5-325 mg per tablet Take 1 tablet by mouth every 4 (four) hours as needed for Pain. 12 tablet 0    predniSONE (DELTASONE) 10 MG tablet 4 by mouth once today, then 3 by mouth tomorrow, then 2 by mouth on day 3, then one by mouth on the last day. 10 tablet 0     No current facility-administered medications for this visit.        Review of patient's allergies indicates:  No Known Allergies    Physical examination  Vitals Reviewed  Gen. Well-dressed well-nourished   Skin warm dry and intact.  No rashes noted.  HEENT.  Oropharynx unremarkable.    Neck is supple without adenopathy  Chest.  Respirations are even unlabored.  Lungs are clear to auscultation.  Cardiac regular rate and rhythm.  No chest wall adenopathy noted.  Neuro. Awake alert oriented x4.  Normal judgment and cognition noted.  Extremities no clubbing cyanosis or edema noted.     Call or return to clinic prn if these symptoms worsen or fail to improve as anticipated.

## 2018-09-11 NOTE — TELEPHONE ENCOUNTER
He is anemic. He needs to see GI and get a colonoscopy. I am placing the referral for GI. Give him number to [facility]

## 2018-09-12 ENCOUNTER — HOSPITAL ENCOUNTER (INPATIENT)
Facility: HOSPITAL | Age: 58
LOS: 12 days | Discharge: HOME | DRG: 386 | End: 2018-09-24
Attending: EMERGENCY MEDICINE | Admitting: HOSPITALIST
Payer: COMMERCIAL

## 2018-09-12 ENCOUNTER — TELEPHONE (OUTPATIENT)
Dept: FAMILY MEDICINE | Facility: CLINIC | Age: 58
End: 2018-09-12

## 2018-09-12 ENCOUNTER — PATIENT MESSAGE (OUTPATIENT)
Dept: FAMILY MEDICINE | Facility: CLINIC | Age: 58
End: 2018-09-12

## 2018-09-12 DIAGNOSIS — D69.6 THROMBOCYTOPENIA: ICD-10-CM

## 2018-09-12 DIAGNOSIS — R79.89 ELEVATED SERUM CREATININE: ICD-10-CM

## 2018-09-12 DIAGNOSIS — R63.4 WEIGHT LOSS, UNINTENTIONAL: ICD-10-CM

## 2018-09-12 DIAGNOSIS — M1A.09X0 CHRONIC GOUT OF MULTIPLE SITES: ICD-10-CM

## 2018-09-12 DIAGNOSIS — K92.2 GASTROINTESTINAL HEMORRHAGE, UNSPECIFIED GASTROINTESTINAL HEMORRHAGE TYPE: Primary | ICD-10-CM

## 2018-09-12 DIAGNOSIS — E44.0 MALNUTRITION OF MODERATE DEGREE: ICD-10-CM

## 2018-09-12 DIAGNOSIS — N17.9 ACUTE RENAL FAILURE: ICD-10-CM

## 2018-09-12 DIAGNOSIS — D62 ACUTE BLOOD LOSS ANEMIA: ICD-10-CM

## 2018-09-12 DIAGNOSIS — K92.2 GASTROINTESTINAL HEMORRHAGE: ICD-10-CM

## 2018-09-12 DIAGNOSIS — I10 ESSENTIAL (PRIMARY) HYPERTENSION: Chronic | ICD-10-CM

## 2018-09-12 DIAGNOSIS — E78.00 PURE HYPERCHOLESTEROLEMIA: Chronic | ICD-10-CM

## 2018-09-12 LAB
ABO + RH BLD: NORMAL
ALBUMIN SERPL BCP-MCNC: 2.9 G/DL
ALP SERPL-CCNC: 82 U/L
ALT SERPL W/O P-5'-P-CCNC: 11 U/L
ANION GAP SERPL CALC-SCNC: 14 MMOL/L
AST SERPL-CCNC: 26 U/L
BACTERIA #/AREA URNS HPF: ABNORMAL /HPF
BASOPHILS # BLD AUTO: 0.02 K/UL
BASOPHILS NFR BLD: 0.3 %
BILIRUB SERPL-MCNC: 1.3 MG/DL
BILIRUB UR QL STRIP: NEGATIVE
BLD GP AB SCN CELLS X3 SERPL QL: NORMAL
BUN SERPL-MCNC: 78 MG/DL
CALCIUM SERPL-MCNC: 8.4 MG/DL
CHLORIDE SERPL-SCNC: 102 MMOL/L
CLARITY UR: CLEAR
CO2 SERPL-SCNC: 18 MMOL/L
COLOR UR: YELLOW
CREAT SERPL-MCNC: 5.8 MG/DL
DIFFERENTIAL METHOD: ABNORMAL
EOSINOPHIL # BLD AUTO: 0.1 K/UL
EOSINOPHIL NFR BLD: 0.7 %
ERYTHROCYTE [DISTWIDTH] IN BLOOD BY AUTOMATED COUNT: 16.7 %
EST. GFR  (AFRICAN AMERICAN): 11 ML/MIN/1.73 M^2
EST. GFR  (NON AFRICAN AMERICAN): 10 ML/MIN/1.73 M^2
GLUCOSE SERPL-MCNC: 199 MG/DL
GLUCOSE UR QL STRIP: ABNORMAL
HCT VFR BLD AUTO: 21.3 %
HGB BLD-MCNC: 7.5 G/DL
HGB UR QL STRIP: ABNORMAL
HYALINE CASTS #/AREA URNS LPF: 0 /LPF
INR PPP: 1
KETONES UR QL STRIP: NEGATIVE
LEUKOCYTE ESTERASE UR QL STRIP: NEGATIVE
LYMPHOCYTES # BLD AUTO: 1.1 K/UL
LYMPHOCYTES NFR BLD: 15.1 %
MCH RBC QN AUTO: 30.2 PG
MCHC RBC AUTO-ENTMCNC: 35.2 G/DL
MCV RBC AUTO: 86 FL
MICROSCOPIC COMMENT: ABNORMAL
MONOCYTES # BLD AUTO: 0.4 K/UL
MONOCYTES NFR BLD: 6.1 %
NEUTROPHILS # BLD AUTO: 5.6 K/UL
NEUTROPHILS NFR BLD: 79.9 %
NITRITE UR QL STRIP: NEGATIVE
PH UR STRIP: 5 [PH] (ref 5–8)
PLATELET # BLD AUTO: 74 K/UL
PMV BLD AUTO: ABNORMAL FL
POTASSIUM SERPL-SCNC: 4.4 MMOL/L
PROT SERPL-MCNC: 7 G/DL
PROT UR QL STRIP: ABNORMAL
PROTHROMBIN TIME: 10.4 SEC
RBC # BLD AUTO: 2.48 M/UL
RBC #/AREA URNS HPF: 2 /HPF (ref 0–4)
SODIUM SERPL-SCNC: 134 MMOL/L
SP GR UR STRIP: 1.01 (ref 1–1.03)
URN SPEC COLLECT METH UR: ABNORMAL
UROBILINOGEN UR STRIP-ACNC: NEGATIVE EU/DL
WBC # BLD AUTO: 7.23 K/UL
WBC #/AREA URNS HPF: 1 /HPF (ref 0–5)

## 2018-09-12 PROCEDURE — 85025 COMPLETE CBC W/AUTO DIFF WBC: CPT

## 2018-09-12 PROCEDURE — 25000003 PHARM REV CODE 250: Performed by: EMERGENCY MEDICINE

## 2018-09-12 PROCEDURE — 63600175 PHARM REV CODE 636 W HCPCS: Performed by: EMERGENCY MEDICINE

## 2018-09-12 PROCEDURE — 85610 PROTHROMBIN TIME: CPT

## 2018-09-12 PROCEDURE — 99285 EMERGENCY DEPT VISIT HI MDM: CPT | Mod: 25

## 2018-09-12 PROCEDURE — 80053 COMPREHEN METABOLIC PANEL: CPT

## 2018-09-12 PROCEDURE — 81000 URINALYSIS NONAUTO W/SCOPE: CPT

## 2018-09-12 PROCEDURE — 11000001 HC ACUTE MED/SURG PRIVATE ROOM

## 2018-09-12 PROCEDURE — 96361 HYDRATE IV INFUSION ADD-ON: CPT

## 2018-09-12 PROCEDURE — 86850 RBC ANTIBODY SCREEN: CPT

## 2018-09-12 PROCEDURE — 96374 THER/PROPH/DIAG INJ IV PUSH: CPT

## 2018-09-12 PROCEDURE — 86920 COMPATIBILITY TEST SPIN: CPT

## 2018-09-12 PROCEDURE — C9113 INJ PANTOPRAZOLE SODIUM, VIA: HCPCS | Performed by: EMERGENCY MEDICINE

## 2018-09-12 RX ORDER — SODIUM CHLORIDE 9 MG/ML
1000 INJECTION, SOLUTION INTRAVENOUS
Status: COMPLETED | OUTPATIENT
Start: 2018-09-12 | End: 2018-09-12

## 2018-09-12 RX ORDER — PANTOPRAZOLE SODIUM 40 MG/10ML
40 INJECTION, POWDER, LYOPHILIZED, FOR SOLUTION INTRAVENOUS
Status: COMPLETED | OUTPATIENT
Start: 2018-09-12 | End: 2018-09-12

## 2018-09-12 RX ADMIN — DEXTROSE 8 MG/HR: 50 INJECTION, SOLUTION INTRAVENOUS at 06:09

## 2018-09-12 RX ADMIN — SODIUM CHLORIDE 1000 ML: 0.9 INJECTION, SOLUTION INTRAVENOUS at 10:09

## 2018-09-12 RX ADMIN — SODIUM CHLORIDE 1000 ML: 0.9 INJECTION, SOLUTION INTRAVENOUS at 06:09

## 2018-09-12 RX ADMIN — PANTOPRAZOLE SODIUM 40 MG: 40 INJECTION, POWDER, LYOPHILIZED, FOR SOLUTION INTRAVENOUS at 06:09

## 2018-09-12 NOTE — TELEPHONE ENCOUNTER
----- Message from Angela Carrillo sent at 9/12/2018 12:43 PM CDT -----  Contact: Self  Pt is returning a call. Please call pt at [phone].

## 2018-09-12 NOTE — TELEPHONE ENCOUNTER
I spoke to his daughter.  She understands that he has acute renal failure.  He needs go the emergency room.  She also plans to leave work and bring him to the emergency room.  She is also going to call her mother.  Her mother's phone number was not available in the chart.  They will go to the emergency room at [facility].  He needs hydration repeat of labs.  Patient needs admit and evaluation by Nephrology.

## 2018-09-12 NOTE — ED PROVIDER NOTES
Encounter Date: 9/12/2018    SCRIBE #1 NOTE: I, Megha Schulz, am scribing for, and in the presence of, Wojciech Cummins MD.       History     Chief Complaint   Patient presents with    Fatigue     Pt seen at urgent care for blood in stool x5 days. Pt referred here due to elevated BUN and creatnine. Denies any s/s    Abnormal Lab     CC: Fatigue    HPI: This 58 y.o male who has HLD and HTN presents to the ED for an evaluation of acute onset, constant fatigue and generalized weakness that began yesterday.  Patient reports for the past 4 days, he has been having red and maroon colored stools.  He reports in the past 48 hours, he has had approximately 8 maroon colored stools.  Patient reports he also has been having a decreased appetite, but reports he has been drinking liquids.  He reports being evaluated and advised to come to the ED for further evaluation.  He reports he currently has an appointment scheduled to have a colonoscopy on 10/9/18 and reports his last colonoscopy 9 years ago.  Patient denies fever, chills, nausea, emesis, diarrhea, abdominal pain, or any other associated symptoms.  No alleviating factors.      The history is provided by the patient. No  was used.     Review of patient's allergies indicates:  No Known Allergies  Past Medical History:   Diagnosis Date    Eczema     Hepatitis B     treated 2010s and negative RNA load afterward    Hyperlipidemia     Hypertension      Past Surgical History:   Procedure Laterality Date    NO PAST SURGERIES       Family History   Problem Relation Age of Onset    Heart disease Father     Diabetes Brother      Social History     Tobacco Use    Smoking status: Never Smoker    Smokeless tobacco: Never Used   Substance Use Topics    Alcohol use: Yes     Alcohol/week: 0.6 oz     Types: 1 Cans of beer per week    Drug use: No     Review of Systems   Constitutional: Negative for chills and fever.   HENT: Negative for ear pain and sore  throat.    Eyes: Negative for pain.   Respiratory: Negative for cough and shortness of breath.    Cardiovascular: Negative for chest pain.   Gastrointestinal: Positive for blood in stool. Negative for abdominal pain, diarrhea, nausea and vomiting.   Genitourinary: Negative for dysuria.   Musculoskeletal: Negative for back pain.        (-) arm or leg problems   Skin: Negative for rash.   Neurological: Negative for headaches.       Physical Exam     Initial Vitals [09/12/18 1710]   BP Pulse Resp Temp SpO2   (!) 184/91 100 16 98.3 °F (36.8 °C) 100 %      MAP       --         Physical Exam    Nursing note and vitals reviewed.  Constitutional: Vital signs are normal. He appears well-developed and well-nourished. He is active.  Non-toxic appearance. No distress.   HENT:   Head: Normocephalic and atraumatic.   Eyes: EOM are normal.   Neck: Trachea normal. Neck supple.   Cardiovascular: Regular rhythm. Tachycardia present.    Pulmonary/Chest: Breath sounds normal. No respiratory distress.   Abdominal: Soft. Normal appearance and bowel sounds are normal. He exhibits no distension. There is no tenderness.   Musculoskeletal: Normal range of motion. He exhibits no edema.   Neurological: He is alert and oriented to person, place, and time. He has normal strength.   Skin: Skin is warm, dry and intact.   Psychiatric: He has a normal mood and affect.         ED Course   Procedures  Labs Reviewed   CBC W/ AUTO DIFFERENTIAL - Abnormal; Notable for the following components:       Result Value    RBC 2.48 (*)     Hemoglobin 7.5 (*)     Hematocrit 21.3 (*)     RDW 16.7 (*)     Platelets 74 (*)     Gran% 79.9 (*)     Lymph% 15.1 (*)     All other components within normal limits   COMPREHENSIVE METABOLIC PANEL - Abnormal; Notable for the following components:    Sodium 134 (*)     CO2 18 (*)     Glucose 199 (*)     BUN, Bld 78 (*)     Creatinine 5.8 (*)     Calcium 8.4 (*)     Albumin 2.9 (*)     Total Bilirubin 1.3 (*)     eGFR if   11 (*)     eGFR if non  10 (*)     All other components within normal limits   PROTIME-INR   URINALYSIS   URINALYSIS MICROSCOPIC   POCT OCCULT BLOOD STOOL   TYPE & SCREEN          Imaging Results    None          Medical Decision Making:   History:   Old Medical Records: I decided to obtain old medical records.  Initial Assessment:   58 y.o male who has HLD and HTN presents to the ED for an evaluation of acute onset, constant fatigue and generalized weakness that began yesterday.  Patient reports for the past 4 days, he has been having red and maroon colored stools.  Clinical Tests:   Lab Tests: Ordered and Reviewed            Scribe Attestation:   Scribe #1: I performed the above scribed service and the documentation accurately describes the services I performed. I attest to the accuracy of the note.    Attending Attestation:           Physician Attestation for Scribe:  Physician Attestation Statement for Scribe #1: I, Wojciech Cummins MD, reviewed documentation, as scribed by Megha Schulz in my presence, and it is both accurate and complete.                    Clinical Impression:   The encounter diagnosis was Gastrointestinal hemorrhage, unspecified gastrointestinal hemorrhage type.                             Wojciech Cummins MD  09/12/18 [id number]

## 2018-09-12 NOTE — TELEPHONE ENCOUNTER
Called pt no answer, called pt daughter Mr. Perdomo informed her of pt condition and she will be going to get her dad and taking him to the ER herself.

## 2018-09-12 NOTE — ED TRIAGE NOTES
Pt states that he was not feeling well over the weekend, began having bloody diarrhea for a few days and went to Urgent Care. Pt states that he is feeling much better today, was able to eat solid food last night.

## 2018-09-12 NOTE — TELEPHONE ENCOUNTER
----- Message from Angela Carrillo sent at 9/12/2018 12:43 PM CDT -----  Contact: Self  Pt is returning a call. Please call pt at [phone].

## 2018-09-12 NOTE — TELEPHONE ENCOUNTER
Patient returned call. Called back at number he left. Left message for him to return call to clinic

## 2018-09-13 PROBLEM — R63.4 WEIGHT LOSS, UNINTENTIONAL: Status: ACTIVE | Noted: 2018-09-13

## 2018-09-13 PROBLEM — E44.0 MALNUTRITION OF MODERATE DEGREE: Status: ACTIVE | Noted: 2018-09-13

## 2018-09-13 PROBLEM — D62 ACUTE BLOOD LOSS ANEMIA: Status: ACTIVE | Noted: 2018-09-13

## 2018-09-13 PROBLEM — N17.9 ACUTE RENAL FAILURE: Status: ACTIVE | Noted: 2018-09-12

## 2018-09-13 PROBLEM — D69.6 THROMBOCYTOPENIA: Status: ACTIVE | Noted: 2018-09-13

## 2018-09-13 PROBLEM — M1A.09X0 CHRONIC GOUT OF MULTIPLE SITES: Status: ACTIVE | Noted: 2018-09-13

## 2018-09-13 LAB
BLD PROD TYP BPU: NORMAL
BLOOD UNIT EXPIRATION DATE: NORMAL
BLOOD UNIT TYPE CODE: 5100
BLOOD UNIT TYPE: NORMAL
CODING SYSTEM: NORMAL
CREAT UR-MCNC: 47.3 MG/DL
DISPENSE STATUS: NORMAL
EOSINOPHIL URNS QL WRIGHT STN: NORMAL
ESTIMATED AVG GLUCOSE: 82 MG/DL
HBA1C MFR BLD HPLC: 4.5 %
HIV1+2 IGG SERPL QL IA.RAPID: NEGATIVE
PROT UR-MCNC: 264 MG/DL
PROT/CREAT UR: 5.58 MG/G{CREAT}
TRANS ERYTHROCYTES VOL PATIENT: NORMAL ML
URATE SERPL-MCNC: 13.4 MG/DL

## 2018-09-13 PROCEDURE — 94761 N-INVAS EAR/PLS OXIMETRY MLT: CPT

## 2018-09-13 PROCEDURE — 84550 ASSAY OF BLOOD/URIC ACID: CPT

## 2018-09-13 PROCEDURE — 11000001 HC ACUTE MED/SURG PRIVATE ROOM

## 2018-09-13 PROCEDURE — 25000003 PHARM REV CODE 250: Performed by: INTERNAL MEDICINE

## 2018-09-13 PROCEDURE — P9021 RED BLOOD CELLS UNIT: HCPCS

## 2018-09-13 PROCEDURE — 83036 HEMOGLOBIN GLYCOSYLATED A1C: CPT

## 2018-09-13 PROCEDURE — 63600175 PHARM REV CODE 636 W HCPCS: Performed by: HOSPITALIST

## 2018-09-13 PROCEDURE — 36415 COLL VENOUS BLD VENIPUNCTURE: CPT

## 2018-09-13 PROCEDURE — 25000003 PHARM REV CODE 250: Performed by: HOSPITALIST

## 2018-09-13 PROCEDURE — 80074 ACUTE HEPATITIS PANEL: CPT

## 2018-09-13 PROCEDURE — 84156 ASSAY OF PROTEIN URINE: CPT

## 2018-09-13 PROCEDURE — C9113 INJ PANTOPRAZOLE SODIUM, VIA: HCPCS | Performed by: HOSPITALIST

## 2018-09-13 PROCEDURE — 84560 ASSAY OF URINE/URIC ACID: CPT

## 2018-09-13 PROCEDURE — 87205 SMEAR GRAM STAIN: CPT

## 2018-09-13 PROCEDURE — 86703 HIV-1/HIV-2 1 RESULT ANTBDY: CPT

## 2018-09-13 PROCEDURE — 36430 TRANSFUSION BLD/BLD COMPNT: CPT

## 2018-09-13 RX ORDER — AMLODIPINE BESYLATE 2.5 MG/1
2.5 TABLET ORAL DAILY
Status: DISCONTINUED | OUTPATIENT
Start: 2018-09-13 | End: 2018-09-14

## 2018-09-13 RX ORDER — POLYETHYLENE GLYCOL 3350, SODIUM SULFATE ANHYDROUS, SODIUM BICARBONATE, SODIUM CHLORIDE, POTASSIUM CHLORIDE 236; 22.74; 6.74; 5.86; 2.97 G/4L; G/4L; G/4L; G/4L; G/4L
2000 POWDER, FOR SOLUTION ORAL
Status: COMPLETED | OUTPATIENT
Start: 2018-09-13 | End: 2018-09-14

## 2018-09-13 RX ORDER — HYDROCODONE BITARTRATE AND ACETAMINOPHEN 500; 5 MG/1; MG/1
TABLET ORAL
Status: DISCONTINUED | OUTPATIENT
Start: 2018-09-13 | End: 2018-09-24 | Stop reason: HOSPADM

## 2018-09-13 RX ORDER — ATORVASTATIN CALCIUM 10 MG/1
20 TABLET, FILM COATED ORAL DAILY
Status: DISCONTINUED | OUTPATIENT
Start: 2018-09-13 | End: 2018-09-24 | Stop reason: HOSPADM

## 2018-09-13 RX ORDER — PANTOPRAZOLE SODIUM 40 MG/10ML
40 INJECTION, POWDER, LYOPHILIZED, FOR SOLUTION INTRAVENOUS 2 TIMES DAILY
Status: DISCONTINUED | OUTPATIENT
Start: 2018-09-13 | End: 2018-09-18

## 2018-09-13 RX ORDER — SODIUM CHLORIDE 9 MG/ML
1000 INJECTION, SOLUTION INTRAVENOUS CONTINUOUS
Status: ACTIVE | OUTPATIENT
Start: 2018-09-13 | End: 2018-09-14

## 2018-09-13 RX ADMIN — PANTOPRAZOLE SODIUM 40 MG: 40 INJECTION, POWDER, LYOPHILIZED, FOR SOLUTION INTRAVENOUS at 08:09

## 2018-09-13 RX ADMIN — PANTOPRAZOLE SODIUM 40 MG: 40 INJECTION, POWDER, LYOPHILIZED, FOR SOLUTION INTRAVENOUS at 11:09

## 2018-09-13 RX ADMIN — AMLODIPINE BESYLATE 2.5 MG: 2.5 TABLET ORAL at 11:09

## 2018-09-13 RX ADMIN — POLYETHYLENE GLYCOL 3350, SODIUM SULFATE ANHYDROUS, SODIUM BICARBONATE, SODIUM CHLORIDE, POTASSIUM CHLORIDE 2000 ML: 236; 22.74; 6.74; 5.86; 2.97 POWDER, FOR SOLUTION ORAL at 06:09

## 2018-09-13 RX ADMIN — SODIUM CHLORIDE 1000 ML: 0.9 INJECTION, SOLUTION INTRAVENOUS at 06:09

## 2018-09-13 RX ADMIN — ATORVASTATIN CALCIUM 20 MG: 10 TABLET, FILM COATED ORAL at 11:09

## 2018-09-13 NOTE — HPI
"57 yo male with HNT, h/o HBV, chronic gout and HLP presented with c/o dark black stools x 5 days, fatigue and weakness. He went to PCP/NP 2 days ago and was told to take "medication" for stools and prednisone for gout flare. He was set up for outpatient c/scope 10/9.  He was concerned about his decreased appetite, diarrhea and weight loss. He denies abdominal pain, hematemesis, fever, chills, N/V.  He was taking indomethicin and ibuprofen intermittently for gout flares over past year. On admit h/h 7.5/21.3.  Coags normal. BUN/Cr 78/5.8.  Albumin 2.9.  GI consulted. Renal u/s pending. Transfusing one unit blood.   "

## 2018-09-13 NOTE — ASSESSMENT & PLAN NOTE
10 months ago normal renal function  Differential includes dehydration, uropathy, NSAID use, ?h/o hbv   Renal ultrasound pending  IVF challenge  BMP in am  Avoid nephrotoxins  P/cr ratio, cardoso stain  Uric acid level

## 2018-09-13 NOTE — CONSULTS
Gastroenterology Consult    9/13/2018 2:36 PM    Consulting Physician:  Viri Blair MD  Primary Care Provider: Patrick Cook MD    Reason for consultation: hematochezia    HPI:  Duran Mccauley is a 58 y.o. male with a history of hepatitis B (treated), HTN, HLD, and eczema who presents with hematochezia for the last week associated with some weakness.  He went to his PCP and was set up for an outpatient colonoscopy on 10/9.  He continued to have hematochezia, so presented to the ER.  He denies any abdominal pain.  No N/V.  Appetite has been good.  He had a colonoscopy about 9.5 years ago and this was normal.  He denies any melena.  No NSAID use.  He has never had bleeding like this before.  The blood appears mixed in with the stool.      Past Medical History:   Diagnosis Date    Eczema     Hepatitis B     treated 2010s and negative RNA load afterward    Hyperlipidemia     Hypertension        Past Surgical History:   Procedure Laterality Date    NO PAST SURGERIES         Social History     Socioeconomic History    Marital status:      Spouse name: None    Number of children: None    Years of education: None    Highest education level: None   Social Needs    Financial resource strain: None    Food insecurity - worry: None    Food insecurity - inability: None    Transportation needs - medical: None    Transportation needs - non-medical: None   Occupational History    None   Tobacco Use    Smoking status: Never Smoker    Smokeless tobacco: Never Used   Substance and Sexual Activity    Alcohol use: Yes     Alcohol/week: 0.6 oz     Types: 1 Cans of beer per week    Drug use: No    Sexual activity: Yes     Partners: Female   Other Topics Concern    None   Social History Narrative    None       Family History   Problem Relation Age of Onset    Heart disease Father     Diabetes Brother          Review of patient's allergies indicates:  No Known Allergies      Current  "Facility-Administered Medications:     0.9%  NaCl infusion (for blood administration), , Intravenous, Q24H PRN, Kiki Guzman MD    0.9%  NaCl infusion, 1,000 mL, Intravenous, Continuous, Kiki Guzman MD    amLODIPine tablet 2.5 mg, 2.5 mg, Oral, Daily, Kiki Guzman MD, 2.5 mg at 09/13/18 1107    atorvastatin tablet 20 mg, 20 mg, Oral, Daily, Kiki Guzman MD, 20 mg at 09/13/18 1107    pantoprazole injection 40 mg, 40 mg, Intravenous, BID, Kiki Guzman MD, 40 mg at 09/13/18 1111    polyethylene glycol (GoLYTELY) solution, 2,000 mL, Oral, Q10H, Viri Blair MD      Review of Systems   Constitutional: Negative for chills, fever and weight loss.   HENT: Negative.    Eyes: Negative.    Respiratory: Negative.    Cardiovascular: Negative.    Gastrointestinal: Positive for blood in stool. Negative for abdominal pain, constipation, diarrhea, heartburn, melena, nausea and vomiting.   Genitourinary: Negative.    Musculoskeletal: Negative.    Skin: Negative.    Neurological: Negative.    Endo/Heme/Allergies: Negative.          BP (!) 160/93   Pulse 76   Temp 97.2 °F (36.2 °C)   Resp 16   Ht 5' 6" (1.676 m)   Wt 59.4 kg (131 lb)   SpO2 100%   BMI 21.14 kg/m²   Physical Exam   Constitutional: He is oriented to person, place, and time. He appears well-developed and well-nourished. No distress.   HENT:   Head: Normocephalic and atraumatic.   Mouth/Throat: Oropharynx is clear and moist.   Eyes: EOM are normal. Pupils are equal, round, and reactive to light. No scleral icterus.   Cardiovascular: Normal rate, regular rhythm and normal heart sounds.   No murmur heard.  Pulmonary/Chest: Effort normal and breath sounds normal. No respiratory distress.   Abdominal: Soft. Bowel sounds are normal. He exhibits no distension. There is no tenderness. There is no rebound and no guarding.   Musculoskeletal: Normal range of motion. He exhibits no edema.   Neurological: He is alert and oriented to person, " place, and time.   Skin: Skin is warm and dry. No rash noted.   Vitals reviewed.      Labs:  Lab Results   Component Value Date/Time    WBC 7.23 09/12/2018 05:23 PM    HGB 7.5 (L) 09/12/2018 05:23 PM    HCT 21.3 (L) 09/12/2018 05:23 PM    PLT 74 (L) 09/12/2018 05:23 PM    MCV 86 09/12/2018 05:23 PM     (L) 09/12/2018 05:23 PM    K 4.4 09/12/2018 05:23 PM     09/12/2018 05:23 PM    CO2 18 (L) 09/12/2018 05:23 PM    BUN 78 (H) 09/12/2018 05:23 PM    CREATININE 5.8 (H) 09/12/2018 05:23 PM     (H) 09/12/2018 05:23 PM    CALCIUM 8.4 (L) 09/12/2018 05:23 PM    INR 1.0 09/12/2018 05:23 PM   ]  Lab Results   Component Value Date/Time    PROT 7.0 09/12/2018 05:23 PM    ALBUMIN 2.9 (L) 09/12/2018 05:23 PM    BILITOT 1.3 (H) 09/12/2018 05:23 PM    AST 26 09/12/2018 05:23 PM    ALT 11 09/12/2018 05:23 PM    ALKPHOS 82 09/12/2018 05:23 PM   ]    Imaging and Procedures:  I personally reviewed the imaging/procedures below.  Retroperitoneal U/S 9/13/18: read pending    Assessment:  Duran Mccauley is a 58 y.o. male with a history of hepatitis B (treated), HTN, HLD, and eczema who presents with painless hematochezia for the last week associated with some weakness.  He was also found to have an DIANE, mild hypoalbuminemia, thrombocytopenia and elevated bilirubin.     Plan:  - clear liquid diet today  - NPO after midnight  - colonoscopy tomorrow to evaluate for bleeding source - suspect diverticular v. AVM, less likely polyp/cancer or hemorrhoids  - if completely negative, may need EGD to rule out upper source  - given slightly elevated bilirubin, low albumin and thrombocytopenia, may want to get a RUQ U/S to evaluate for chronic liver disease, especially given history of hepatitis B    Viri Blair MD

## 2018-09-13 NOTE — SUBJECTIVE & OBJECTIVE
Past Medical History:   Diagnosis Date    Eczema     Hepatitis B     treated 2010s and negative RNA load afterward    Hyperlipidemia     Hypertension        Past Surgical History:   Procedure Laterality Date    NO PAST SURGERIES         Review of patient's allergies indicates:  No Known Allergies    No current facility-administered medications on file prior to encounter.      Current Outpatient Medications on File Prior to Encounter   Medication Sig    atorvastatin (LIPITOR) 20 MG tablet Take 1 tablet (20 mg total) by mouth once daily.    atorvastatin (LIPITOR) 20 MG tablet TAKE 1 TABLET(20 MG) BY MOUTH EVERY DAY    hydroCHLOROthiazide (HYDRODIURIL) 12.5 MG Tab Take 1 tablet (12.5 mg total) by mouth once daily.    predniSONE (DELTASONE) 10 MG tablet 4 by mouth once today, then 3 by mouth tomorrow, then 2 by mouth on day 3, then one by mouth on the last day.    ibuprofen (ADVIL,MOTRIN) 800 MG tablet Take 800 mg by mouth once daily.    indomethacin (INDOCIN) 50 MG capsule Take 1 capsule (50 mg total) by mouth 3 (three) times daily as needed (joint pain/gout flares).    lidocaine (LIDODERM) 5 % Place 1 patch onto the skin once daily. Remove & Discard patch within 12 hours or as directed by MD    methylPREDNISolone (MEDROL DOSEPACK) 4 mg tablet use as directed    oxycodone-acetaminophen (PERCOCET) 5-325 mg per tablet Take 1 tablet by mouth every 4 (four) hours as needed for Pain.     Family History     Problem Relation (Age of Onset)    Diabetes Brother    Heart disease Father        Tobacco Use    Smoking status: Never Smoker    Smokeless tobacco: Never Used   Substance and Sexual Activity    Alcohol use: Yes     Alcohol/week: 0.6 oz     Types: 1 Cans of beer per week    Drug use: No    Sexual activity: Yes     Partners: Female     Review of Systems   Constitutional: Positive for activity change, appetite change and fatigue.   HENT: Negative.    Eyes: Negative.    Respiratory: Negative.     Cardiovascular: Negative.    Gastrointestinal: Positive for blood in stool and diarrhea.   Endocrine: Negative.    Genitourinary: Negative.    Musculoskeletal: Positive for arthralgias.   Neurological: Negative.    Psychiatric/Behavioral: Negative.      Objective:     Vital Signs (Most Recent):  Temp: 98.6 °F (37 °C) (09/13/18 0806)  Pulse: 70 (09/13/18 0806)  Resp: 16 (09/13/18 0806)  BP: (!) 146/86 (09/13/18 0806)  SpO2: (!) 16 % (09/13/18 0806) Vital Signs (24h Range):  Temp:  [97.7 °F (36.5 °C)-98.6 °F (37 °C)] 98.6 °F (37 °C)  Pulse:  [] 70  Resp:  [16-20] 16  SpO2:  [16 %-100 %] 16 %  BP: (122-184)/(71-96) 146/86     Weight: 59.4 kg (131 lb)  Body mass index is 21.14 kg/m².    Physical Exam   Constitutional: He is oriented to person, place, and time. He appears well-developed and well-nourished.   HENT:   Head: Normocephalic and atraumatic.   Mouth/Throat: Oropharynx is clear and moist.   Eyes: EOM are normal. Pupils are equal, round, and reactive to light.   Neck: Normal range of motion. Neck supple. No JVD present.   Cardiovascular: Normal rate, regular rhythm, normal heart sounds and intact distal pulses.   Pulmonary/Chest: Effort normal and breath sounds normal. No respiratory distress.   Abdominal: Soft. Bowel sounds are normal. He exhibits no distension and no mass. There is no tenderness. There is no rebound and no guarding.   Musculoskeletal: Normal range of motion. He exhibits no edema.   Neurological: He is alert and oriented to person, place, and time.   Skin: Skin is warm and dry. Capillary refill takes less than 2 seconds. He is not diaphoretic.   Psychiatric: He has a normal mood and affect. His behavior is normal.         CRANIAL NERVES     CN III, IV, VI   Pupils are equal, round, and reactive to light.  Extraocular motions are normal.        Significant Labs:   CBC:   Recent Labs   Lab  09/12/18   1723   WBC  7.23   HGB  7.5*   HCT  21.3*   PLT  74*     CMP:   Recent Labs   Lab   09/12/18   1723   NA  134*   K  4.4   CL  102   CO2  18*   GLU  199*   BUN  78*   CREATININE  5.8*   CALCIUM  8.4*   PROT  7.0   ALBUMIN  2.9*   BILITOT  1.3*   ALKPHOS  82   AST  26   ALT  11   ANIONGAP  14   EGFRNONAA  10*     Coagulation:   Recent Labs   Lab  09/12/18   1723   INR  1.0     Magnesium: No results for input(s): MG in the last 48 hours.  POCT Glucose: No results for input(s): POCTGLUCOSE in the last 48 hours.  Urine Studies:   Recent Labs   Lab  09/12/18 2054   COLORU  Yellow   APPEARANCEUA  Clear   PHUR  5.0   SPECGRAV  1.010   PROTEINUA  2+*   GLUCUA  1+*   KETONESU  Negative   BILIRUBINUA  Negative   OCCULTUA  2+*   NITRITE  Negative   UROBILINOGEN  Negative   LEUKOCYTESUR  Negative   RBCUA  2   WBCUA  1   BACTERIA  Few*   HYALINECASTS  0       Significant Imaging: I have reviewed all pertinent imaging results/findings within the past 24 hours.

## 2018-09-13 NOTE — NURSING
Pt arrived to [facility] floor via transport. NAD noted. Ambulates independently. Denies any pain. Pt oriented to room, bed in low position, call light within reach. POC d/w pt and family. No questions at this time. Non skid socks on. VSSAF. No other complaints at this time.

## 2018-09-13 NOTE — H&P
"[facility] Medicine  History & Physical    Patient Name: Duran Mccauley  MRN: [id number]  Admission Date: 9/12/2018  Attending Physician: Kiki Guzman MD   Primary Care Provider: Patrick Cook MD         Patient information was obtained from patient and ER records.     Subjective:     Principal Problem:Gastrointestinal hemorrhage    Chief Complaint:   Chief Complaint   Patient presents with    Fatigue     Pt seen at urgent care for blood in stool x5 days. Pt referred here due to elevated BUN and creatnine. Denies any s/s    Abnormal Lab        HPI: 59 yo male with HNT, h/o HBV, chronic gout and HLP presented with c/o dark black stools x 5 days, fatigue and weakness. He went to PCP/NP 2 days ago and was told to take "medication" for stools and prednisone for gout flare. He was set up for outpatient c/scope 10/9.  He was concerned about his decreased appetite, diarrhea and weight loss. He denies abdominal pain, hematemesis, fever, chills, N/V.  He was taking indomethicin and ibuprofen intermittently for gout flares over past year. On admit h/h 7.5/21.3.  Coags normal. BUN/Cr 78/5.8.  Albumin 2.9.  GI consulted. Renal u/s pending. Transfusing one unit blood.     Past Medical History:   Diagnosis Date    Eczema     Hepatitis B     treated 2010s and negative RNA load afterward    Hyperlipidemia     Hypertension        Past Surgical History:   Procedure Laterality Date    NO PAST SURGERIES         Review of patient's allergies indicates:  No Known Allergies    No current facility-administered medications on file prior to encounter.      Current Outpatient Medications on File Prior to Encounter   Medication Sig    atorvastatin (LIPITOR) 20 MG tablet Take 1 tablet (20 mg total) by mouth once daily.    atorvastatin (LIPITOR) 20 MG tablet TAKE 1 TABLET(20 MG) BY MOUTH EVERY DAY    hydroCHLOROthiazide (HYDRODIURIL) 12.5 MG Tab Take 1 tablet (12.5 mg total) by mouth once daily. "    predniSONE (DELTASONE) 10 MG tablet 4 by mouth once today, then 3 by mouth tomorrow, then 2 by mouth on day 3, then one by mouth on the last day.    ibuprofen (ADVIL,MOTRIN) 800 MG tablet Take 800 mg by mouth once daily.    indomethacin (INDOCIN) 50 MG capsule Take 1 capsule (50 mg total) by mouth 3 (three) times daily as needed (joint pain/gout flares).    lidocaine (LIDODERM) 5 % Place 1 patch onto the skin once daily. Remove & Discard patch within 12 hours or as directed by MD    methylPREDNISolone (MEDROL DOSEPACK) 4 mg tablet use as directed    oxycodone-acetaminophen (PERCOCET) 5-325 mg per tablet Take 1 tablet by mouth every 4 (four) hours as needed for Pain.     Family History     Problem Relation (Age of Onset)    Diabetes Brother    Heart disease Father        Tobacco Use    Smoking status: Never Smoker    Smokeless tobacco: Never Used   Substance and Sexual Activity    Alcohol use: Yes     Alcohol/week: 0.6 oz     Types: 1 Cans of beer per week    Drug use: No    Sexual activity: Yes     Partners: Female     Review of Systems   Constitutional: Positive for activity change, appetite change and fatigue.   HENT: Negative.    Eyes: Negative.    Respiratory: Negative.    Cardiovascular: Negative.    Gastrointestinal: Positive for blood in stool and diarrhea.   Endocrine: Negative.    Genitourinary: Negative.    Musculoskeletal: Positive for arthralgias.   Neurological: Negative.    Psychiatric/Behavioral: Negative.      Objective:     Vital Signs (Most Recent):  Temp: 98.6 °F (37 °C) (09/13/18 0806)  Pulse: 70 (09/13/18 0806)  Resp: 16 (09/13/18 0806)  BP: (!) 146/86 (09/13/18 0806)  SpO2: (!) 16 % (09/13/18 0806) Vital Signs (24h Range):  Temp:  [97.7 °F (36.5 °C)-98.6 °F (37 °C)] 98.6 °F (37 °C)  Pulse:  [] 70  Resp:  [16-20] 16  SpO2:  [16 %-100 %] 16 %  BP: (122-184)/(71-96) 146/86     Weight: 59.4 kg (131 lb)  Body mass index is 21.14 kg/m².    Physical Exam   Constitutional: He is  oriented to person, place, and time. He appears well-developed and well-nourished.   HENT:   Head: Normocephalic and atraumatic.   Mouth/Throat: Oropharynx is clear and moist.   Eyes: EOM are normal. Pupils are equal, round, and reactive to light.   Neck: Normal range of motion. Neck supple. No JVD present.   Cardiovascular: Normal rate, regular rhythm, normal heart sounds and intact distal pulses.   Pulmonary/Chest: Effort normal and breath sounds normal. No respiratory distress.   Abdominal: Soft. Bowel sounds are normal. He exhibits no distension and no mass. There is no tenderness. There is no rebound and no guarding.   Musculoskeletal: Normal range of motion. He exhibits no edema.   Neurological: He is alert and oriented to person, place, and time.   Skin: Skin is warm and dry. Capillary refill takes less than 2 seconds. He is not diaphoretic.   Psychiatric: He has a normal mood and affect. His behavior is normal.         CRANIAL NERVES     CN III, IV, VI   Pupils are equal, round, and reactive to light.  Extraocular motions are normal.        Significant Labs:   CBC:   Recent Labs   Lab  09/12/18   1723   WBC  7.23   HGB  7.5*   HCT  21.3*   PLT  74*     CMP:   Recent Labs   Lab  09/12/18   1723   NA  134*   K  4.4   CL  102   CO2  18*   GLU  199*   BUN  78*   CREATININE  5.8*   CALCIUM  8.4*   PROT  7.0   ALBUMIN  2.9*   BILITOT  1.3*   ALKPHOS  82   AST  26   ALT  11   ANIONGAP  14   EGFRNONAA  10*     Coagulation:   Recent Labs   Lab  09/12/18   1723   INR  1.0     Magnesium: No results for input(s): MG in the last 48 hours.  POCT Glucose: No results for input(s): POCTGLUCOSE in the last 48 hours.  Urine Studies:   Recent Labs   Lab  09/12/18   2054   COLORU  Yellow   APPEARANCEUA  Clear   PHUR  5.0   SPECGRAV  1.010   PROTEINUA  2+*   GLUCUA  1+*   KETONESU  Negative   BILIRUBINUA  Negative   OCCULTUA  2+*   NITRITE  Negative   UROBILINOGEN  Negative   LEUKOCYTESUR  Negative   RBCUA  2   WBCUA  1    BACTERIA  Few*   HYALINECASTS  0       Significant Imaging: I have reviewed all pertinent imaging results/findings within the past 24 hours.    Assessment/Plan:     * Gastrointestinal hemorrhage    GI consulted  PPI BID  Stool for occult blood  Clear liquid diet  Monitor CBC  IVF            Malnutrition of moderate degree    BOOST TID          Weight loss, unintentional      See above         Thrombocytopenia    Hold heparin/lovenox for DVT prophylaxis  ?h/o hepatitis B but treated 2010 - liver disease, chronic anemia, other chronic illness/malignancy?  Hepatitis and HIV pending  Ivana/scd for DVT prophylaxis             Acute blood loss anemia    Pt reports maroon and black stools  Taking anti-inflammatory meds for gout  Outpatient c/scope scheduled 10/9      GI consulted  Cbc in am  Transfuse one unit         Chronic gout of multiple sites    Uric acid level pending  Prednisone for acute flare  No NSAIDS or ibuprofen            Acute renal failure    10 months ago normal renal function  Differential includes dehydration, uropathy, NSAID use, ?h/o hbv   Renal ultrasound pending  IVF challenge  BMP in am  Avoid nephrotoxins  P/cr ratio, cardoso stain  Uric acid level           Pure hypercholesterolemia    Resume statin         Essential (primary) hypertension    DC HCTZ as it can induce gout flares  Norvasc daily and titrate as needed             VTE Risk Mitigation (From admission, onward)        Ordered     IP VTE HIGH RISK PATIENT  Once      09/12/18 2341     Place IVANA hose  Until discontinued      09/12/18 2341     Place sequential compression device  Until discontinued      09/12/18 2341             Kiki Guzman MD  Department of Hospital Medicine   [facility]

## 2018-09-13 NOTE — PLAN OF CARE
Problem: Patient Care Overview  Goal: Plan of Care Review  Outcome: Ongoing (interventions implemented as appropriate)  Pt remains free of falls and injuries. No bleeding noted overnight. Ambulates independently. Non skid socks on. Denies any pain. Pt understands to call for assistance as needed. VSSAF. No other complaints at this time.

## 2018-09-13 NOTE — ASSESSMENT & PLAN NOTE
Hold heparin/lovenox for DVT prophylaxis  ?h/o hepatitis B but treated 2010 - liver disease, chronic anemia, other chronic illness/malignancy?  Hepatitis and HIV pending  Og/scd for DVT prophylaxis

## 2018-09-13 NOTE — ASSESSMENT & PLAN NOTE
Pt reports maroon and black stools  Taking anti-inflammatory meds for gout  Outpatient c/scope scheduled 10/9      GI consulted  Cbc in am  Transfuse one unit

## 2018-09-13 NOTE — PLAN OF CARE
Problem: Patient Care Overview  Goal: Plan of Care Review  Outcome: Ongoing (interventions implemented as appropriate)  AO x4, no falls or injury. No further bleeding noted or reported. Denies pain or SOB. Discussed s/s of blood transfusion reaction, to notify nurse should s/s occur, pt verbalized understanding. Received 1 unit of blood, tolerated well.

## 2018-09-13 NOTE — PLAN OF CARE
09/13/18 1035   Discharge Assessment   Assessment Type Discharge Planning Assessment   Confirmed/corrected address and phone number on facesheet? Yes   Assessment information obtained from? Patient   Prior to hospitilization cognitive status: Alert/Oriented   Prior to hospitalization functional status: Assistive Equipment   Current cognitive status: Alert/Oriented   Current Functional Status: Assistive Equipment   Facility Arrived From: Home    Lives With child(hardy), adult;spouse   Able to Return to Prior Arrangements yes   Is patient able to care for self after discharge? Yes   Who are your caregiver(s) and their phone number(s)? Merline, pt spouse [phone]    Patient's perception of discharge disposition admitted as an inpatient   Readmission Within The Last 30 Days no previous admission in last 30 days   Patient currently being followed by outpatient case management? No   Patient currently receives any other outside agency services? No   Equipment Currently Used at Home none   Do you have any problems affording any of your prescribed medications? No   Is the patient taking medications as prescribed? yes   Does the patient have transportation home? Yes   Transportation Available car;family or friend will provide   Dialysis Name and Scheduled days N/A    Does the patient receive services at the [facility]? No   Discharge Plan A Home with family   Discharge Plan B Home with family   Patient/Family In Agreement With Plan yes     SW to patient's room to discuss Helping the patient manage care at home.   TN/SW role explained to pt.  Patient identified using two identifiers:  Name and date of birth.    SW's name and contact info placed on white board.     Person who will help at home if needed:  Pt spouse, Merline will help at home.     Preferred pharmacy:   [id number] [address]  [address] [facility] [address] [place]  [address] [address]  Phone:  [phone] Fax: [phone]    Preferred Appointment time: SW schedule py PCP follow-up appointment.

## 2018-09-14 ENCOUNTER — ANESTHESIA (OUTPATIENT)
Dept: ENDOSCOPY | Facility: HOSPITAL | Age: 58
DRG: 386 | End: 2018-09-14
Payer: COMMERCIAL

## 2018-09-14 ENCOUNTER — ANESTHESIA EVENT (OUTPATIENT)
Dept: ENDOSCOPY | Facility: HOSPITAL | Age: 58
DRG: 386 | End: 2018-09-14
Payer: COMMERCIAL

## 2018-09-14 LAB
ALBUMIN SERPL BCP-MCNC: 2.5 G/DL
ALP SERPL-CCNC: 68 U/L
ALT SERPL W/O P-5'-P-CCNC: 11 U/L
ANION GAP SERPL CALC-SCNC: 11 MMOL/L
AST SERPL-CCNC: 30 U/L
BASOPHILS # BLD AUTO: 0.04 K/UL
BASOPHILS NFR BLD: 0.6 %
BILIRUB SERPL-MCNC: 1.3 MG/DL
BLD PROD TYP BPU: NORMAL
BLD PROD TYP BPU: NORMAL
BLOOD UNIT EXPIRATION DATE: NORMAL
BLOOD UNIT EXPIRATION DATE: NORMAL
BLOOD UNIT TYPE CODE: 5100
BLOOD UNIT TYPE CODE: 5100
BLOOD UNIT TYPE: NORMAL
BLOOD UNIT TYPE: NORMAL
BUN SERPL-MCNC: 72 MG/DL
CALCIUM SERPL-MCNC: 8.1 MG/DL
CHLORIDE SERPL-SCNC: 111 MMOL/L
CO2 SERPL-SCNC: 20 MMOL/L
CODING SYSTEM: NORMAL
CODING SYSTEM: NORMAL
CREAT SERPL-MCNC: 5.1 MG/DL
DIFFERENTIAL METHOD: ABNORMAL
DISPENSE STATUS: NORMAL
DISPENSE STATUS: NORMAL
EOSINOPHIL # BLD AUTO: 0.3 K/UL
EOSINOPHIL NFR BLD: 4.7 %
ERYTHROCYTE [DISTWIDTH] IN BLOOD BY AUTOMATED COUNT: 17.4 %
EST. GFR  (AFRICAN AMERICAN): 13 ML/MIN/1.73 M^2
EST. GFR  (NON AFRICAN AMERICAN): 11 ML/MIN/1.73 M^2
GLUCOSE SERPL-MCNC: 89 MG/DL
HAV IGM SERPL QL IA: NEGATIVE
HBV CORE AB SERPL QL IA: POSITIVE
HBV CORE IGM SERPL QL IA: NEGATIVE
HBV SURFACE AB SER-ACNC: NEGATIVE M[IU]/ML
HBV SURFACE AG SERPL QL IA: NEGATIVE
HBV SURFACE AG SERPL QL IA: NEGATIVE
HCT VFR BLD AUTO: 22.2 %
HCV AB SERPL QL IA: NEGATIVE
HCV AB SERPL QL IA: NEGATIVE
HGB BLD-MCNC: 7.3 G/DL
LYMPHOCYTES # BLD AUTO: 1.3 K/UL
LYMPHOCYTES NFR BLD: 19.2 %
MCH RBC QN AUTO: 29.7 PG
MCHC RBC AUTO-ENTMCNC: 32.9 G/DL
MCV RBC AUTO: 90 FL
MONOCYTES # BLD AUTO: 0.8 K/UL
MONOCYTES NFR BLD: 11.3 %
NEUTROPHILS # BLD AUTO: 4.2 K/UL
NEUTROPHILS NFR BLD: 64.2 %
PLATELET # BLD AUTO: 88 K/UL
PMV BLD AUTO: 11.8 FL
POTASSIUM SERPL-SCNC: 4.3 MMOL/L
PROT SERPL-MCNC: 5.7 G/DL
RBC # BLD AUTO: 2.46 M/UL
SODIUM SERPL-SCNC: 142 MMOL/L
TRANS ERYTHROCYTES VOL PATIENT: NORMAL ML
TRANS ERYTHROCYTES VOL PATIENT: NORMAL ML
URATE UR-MCNC: 13.6 MG/DL
WBC # BLD AUTO: 6.97 K/UL

## 2018-09-14 PROCEDURE — 0DBE8ZX EXCISION OF LARGE INTESTINE, VIA NATURAL OR ARTIFICIAL OPENING ENDOSCOPIC, DIAGNOSTIC: ICD-10-PCS | Performed by: INTERNAL MEDICINE

## 2018-09-14 PROCEDURE — 25000003 PHARM REV CODE 250: Performed by: INTERNAL MEDICINE

## 2018-09-14 PROCEDURE — D9220A PRA ANESTHESIA: Mod: ANES,,, | Performed by: ANESTHESIOLOGY

## 2018-09-14 PROCEDURE — 27201012 HC FORCEPS, HOT/COLD, DISP: Performed by: INTERNAL MEDICINE

## 2018-09-14 PROCEDURE — 37000009 HC ANESTHESIA EA ADD 15 MINS: Performed by: INTERNAL MEDICINE

## 2018-09-14 PROCEDURE — 88305 TISSUE EXAM BY PATHOLOGIST: CPT | Mod: 26,,, | Performed by: PATHOLOGY

## 2018-09-14 PROCEDURE — 63600175 PHARM REV CODE 636 W HCPCS: Performed by: NURSE ANESTHETIST, CERTIFIED REGISTERED

## 2018-09-14 PROCEDURE — 80053 COMPREHEN METABOLIC PANEL: CPT

## 2018-09-14 PROCEDURE — 36430 TRANSFUSION BLD/BLD COMPNT: CPT

## 2018-09-14 PROCEDURE — 87340 HEPATITIS B SURFACE AG IA: CPT

## 2018-09-14 PROCEDURE — 25000003 PHARM REV CODE 250: Performed by: ANESTHESIOLOGY

## 2018-09-14 PROCEDURE — 86803 HEPATITIS C AB TEST: CPT

## 2018-09-14 PROCEDURE — 45380 COLONOSCOPY AND BIOPSY: CPT | Performed by: INTERNAL MEDICINE

## 2018-09-14 PROCEDURE — 88342 IMHCHEM/IMCYTCHM 1ST ANTB: CPT | Mod: 26,,, | Performed by: PATHOLOGY

## 2018-09-14 PROCEDURE — 11000001 HC ACUTE MED/SURG PRIVATE ROOM

## 2018-09-14 PROCEDURE — 0DBB8ZX EXCISION OF ILEUM, VIA NATURAL OR ARTIFICIAL OPENING ENDOSCOPIC, DIAGNOSTIC: ICD-10-PCS | Performed by: INTERNAL MEDICINE

## 2018-09-14 PROCEDURE — P9021 RED BLOOD CELLS UNIT: HCPCS

## 2018-09-14 PROCEDURE — 85025 COMPLETE CBC W/AUTO DIFF WBC: CPT

## 2018-09-14 PROCEDURE — 88305 TISSUE EXAM BY PATHOLOGIST: CPT | Performed by: PATHOLOGY

## 2018-09-14 PROCEDURE — 63600175 PHARM REV CODE 636 W HCPCS: Performed by: HOSPITALIST

## 2018-09-14 PROCEDURE — 86706 HEP B SURFACE ANTIBODY: CPT

## 2018-09-14 PROCEDURE — C9113 INJ PANTOPRAZOLE SODIUM, VIA: HCPCS | Performed by: HOSPITALIST

## 2018-09-14 PROCEDURE — 36415 COLL VENOUS BLD VENIPUNCTURE: CPT

## 2018-09-14 PROCEDURE — 25000003 PHARM REV CODE 250: Performed by: HOSPITALIST

## 2018-09-14 PROCEDURE — 86704 HEP B CORE ANTIBODY TOTAL: CPT

## 2018-09-14 PROCEDURE — 88342 IMHCHEM/IMCYTCHM 1ST ANTB: CPT | Performed by: PATHOLOGY

## 2018-09-14 PROCEDURE — 37000008 HC ANESTHESIA 1ST 15 MINUTES: Performed by: INTERNAL MEDICINE

## 2018-09-14 PROCEDURE — D9220A PRA ANESTHESIA: Mod: CRNA,,, | Performed by: NURSE ANESTHETIST, CERTIFIED REGISTERED

## 2018-09-14 RX ORDER — PROPOFOL 10 MG/ML
VIAL (ML) INTRAVENOUS
Status: DISPENSED
Start: 2018-09-14 | End: 2018-09-14

## 2018-09-14 RX ORDER — HYDROCODONE BITARTRATE AND ACETAMINOPHEN 500; 5 MG/1; MG/1
TABLET ORAL
Status: DISCONTINUED | OUTPATIENT
Start: 2018-09-14 | End: 2018-09-24 | Stop reason: HOSPADM

## 2018-09-14 RX ORDER — SODIUM CHLORIDE 9 MG/ML
INJECTION, SOLUTION INTRAVENOUS ONCE
Status: COMPLETED | OUTPATIENT
Start: 2018-09-14 | End: 2018-09-14

## 2018-09-14 RX ORDER — AMLODIPINE BESYLATE 5 MG/1
10 TABLET ORAL DAILY
Status: DISCONTINUED | OUTPATIENT
Start: 2018-09-15 | End: 2018-09-24 | Stop reason: HOSPADM

## 2018-09-14 RX ORDER — LIDOCAINE HCL/PF 100 MG/5ML
SYRINGE (ML) INTRAVENOUS
Status: DISPENSED
Start: 2018-09-14 | End: 2018-09-14

## 2018-09-14 RX ORDER — PROPOFOL 10 MG/ML
VIAL (ML) INTRAVENOUS CONTINUOUS PRN
Status: DISCONTINUED | OUTPATIENT
Start: 2018-09-14 | End: 2018-09-14

## 2018-09-14 RX ORDER — LIDOCAINE HCL/PF 100 MG/5ML
SYRINGE (ML) INTRAVENOUS
Status: DISCONTINUED | OUTPATIENT
Start: 2018-09-14 | End: 2018-09-14

## 2018-09-14 RX ORDER — PROPOFOL 10 MG/ML
VIAL (ML) INTRAVENOUS
Status: DISCONTINUED | OUTPATIENT
Start: 2018-09-14 | End: 2018-09-14

## 2018-09-14 RX ADMIN — POLYETHYLENE GLYCOL 3350, SODIUM SULFATE ANHYDROUS, SODIUM BICARBONATE, SODIUM CHLORIDE, POTASSIUM CHLORIDE 2000 ML: 236; 22.74; 6.74; 5.86; 2.97 POWDER, FOR SOLUTION ORAL at 04:09

## 2018-09-14 RX ADMIN — PANTOPRAZOLE SODIUM 40 MG: 40 INJECTION, POWDER, LYOPHILIZED, FOR SOLUTION INTRAVENOUS at 09:09

## 2018-09-14 RX ADMIN — PROPOFOL 100 MG: 10 INJECTION, EMULSION INTRAVENOUS at 09:09

## 2018-09-14 RX ADMIN — ATORVASTATIN CALCIUM 20 MG: 10 TABLET, FILM COATED ORAL at 12:09

## 2018-09-14 RX ADMIN — PANTOPRAZOLE SODIUM 40 MG: 40 INJECTION, POWDER, LYOPHILIZED, FOR SOLUTION INTRAVENOUS at 12:09

## 2018-09-14 RX ADMIN — LIDOCAINE HYDROCHLORIDE 100 MG: 20 INJECTION, SOLUTION INTRAVENOUS at 09:09

## 2018-09-14 RX ADMIN — PROPOFOL 140 MCG/KG/MIN: 10 INJECTION, EMULSION INTRAVENOUS at 09:09

## 2018-09-14 RX ADMIN — SODIUM CHLORIDE: 0.9 INJECTION, SOLUTION INTRAVENOUS at 09:09

## 2018-09-14 RX ADMIN — AMLODIPINE BESYLATE 2.5 MG: 2.5 TABLET ORAL at 12:09

## 2018-09-14 NOTE — PROGRESS NOTES
1st unit PRBC complete, pt tolerated well. IV leaking. Pt off unit to U/S. 2nd unit to be started after return to floor

## 2018-09-14 NOTE — SUBJECTIVE & OBJECTIVE
Interval History: pt denies bloody BMs since admission, diet advanced     Review of Systems   Constitutional: Positive for activity change, appetite change and fatigue.   HENT: Negative.    Eyes: Negative.    Respiratory: Negative.    Cardiovascular: Negative.    Gastrointestinal: Positive for blood in stool and diarrhea.   Endocrine: Negative.    Genitourinary: Negative.    Musculoskeletal: Positive for arthralgias.   Neurological: Negative.    Psychiatric/Behavioral: Negative.      Objective:     Vital Signs (Most Recent):  Temp: 98.3 °F (36.8 °C) (09/14/18 1528)  Pulse: 86 (09/14/18 1528)  Resp: 18 (09/14/18 1528)  BP: 129/72 (09/14/18 1528)  SpO2: 100 % (09/14/18 1528) Vital Signs (24h Range):  Temp:  [97.5 °F (36.4 °C)-98.5 °F (36.9 °C)] 98.3 °F (36.8 °C)  Pulse:  [66-86] 86  Resp:  [16-18] 18  SpO2:  [98 %-100 %] 100 %  BP: (120-177)/(67-86) 129/72     Weight: 59.4 kg (131 lb)  Body mass index is 21.14 kg/m².    Intake/Output Summary (Last 24 hours) at 9/14/2018 1726  Last data filed at 9/14/2018 1013  Gross per 24 hour   Intake 2179.12 ml   Output 375 ml   Net 1804.12 ml      Physical Exam   Constitutional: He is oriented to person, place, and time. He appears well-developed and well-nourished.   HENT:   Head: Normocephalic and atraumatic.   Mouth/Throat: Oropharynx is clear and moist.   Eyes: EOM are normal. Pupils are equal, round, and reactive to light.   Neck: Normal range of motion. Neck supple. No JVD present.   Cardiovascular: Normal rate, regular rhythm, normal heart sounds and intact distal pulses.   Pulmonary/Chest: Effort normal and breath sounds normal. No respiratory distress.   Abdominal: Soft. Bowel sounds are normal. He exhibits no distension and no mass. There is no tenderness. There is no rebound and no guarding.   Musculoskeletal: Normal range of motion. He exhibits no edema.   Neurological: He is alert and oriented to person, place, and time.   Skin: Skin is warm and dry. Capillary refill  takes less than 2 seconds. He is not diaphoretic.   Psychiatric: He has a normal mood and affect. His behavior is normal.       Significant Labs:   BMP:   Recent Labs   Lab  09/14/18   0409   GLU  89   NA  142   K  4.3   CL  111*   CO2  20*   BUN  72*   CREATININE  5.1*   CALCIUM  8.1*     CBC:   Recent Labs   Lab  09/14/18   0409   WBC  6.97   HGB  7.3*   HCT  22.2*   PLT  88*       Significant Imaging: I have reviewed and interpreted all pertinent imaging results/findings within the past 24 hours.

## 2018-09-14 NOTE — PROGRESS NOTES
"[facility] Medicine  Progress Note    Patient Name: Duran Mccauley  MRN: [id number]  Patient Class: IP- Inpatient   Admission Date: 9/12/2018  Length of Stay: 2 days  Attending Physician: Kiki Guzman MD  Primary Care Provider: Patrick Cook MD        Subjective:     Principal Problem:Gastrointestinal hemorrhage    HPI:  57 yo male with HNT, h/o HBV, chronic gout and HLP presented with c/o dark black stools x 5 days, fatigue and weakness. He went to PCP/NP 2 days ago and was told to take "medication" for stools and prednisone for gout flare. He was set up for outpatient c/scope 10/9.  He was concerned about his decreased appetite, diarrhea and weight loss. He denies abdominal pain, hematemesis, fever, chills, N/V.  He was taking indomethicin and ibuprofen intermittently for gout flares over past year. On admit h/h 7.5/21.3.  Coags normal. BUN/Cr 78/5.8.  Albumin 2.9.  GI consulted. Renal u/s pending. Transfusing one unit blood.     Hospital Course:  Pt admitted with ARF and anemia/diarrhea. Underwent c/scope 9/13. Transfuse one unit with inappropriate response in h/h. Plan to transfuse two units PRBCs today. Biopsy pending +/- steroids and mesalamine. Nephrology consulted, marginal improvement in renal function. Serum uric acid level elevated and urine level normal. No acute flare appreciated.     Interval History: pt denies bloody BMs since admission, diet advanced     Review of Systems   Constitutional: Positive for activity change, appetite change and fatigue.   HENT: Negative.    Eyes: Negative.    Respiratory: Negative.    Cardiovascular: Negative.    Gastrointestinal: Positive for blood in stool and diarrhea.   Endocrine: Negative.    Genitourinary: Negative.    Musculoskeletal: Positive for arthralgias.   Neurological: Negative.    Psychiatric/Behavioral: Negative.      Objective:     Vital Signs (Most Recent):  Temp: 98.3 °F (36.8 °C) (09/14/18 1528)  Pulse: 86 (09/14/18 " 1528)  Resp: 18 (09/14/18 1528)  BP: 129/72 (09/14/18 1528)  SpO2: 100 % (09/14/18 1528) Vital Signs (24h Range):  Temp:  [97.5 °F (36.4 °C)-98.5 °F (36.9 °C)] 98.3 °F (36.8 °C)  Pulse:  [66-86] 86  Resp:  [16-18] 18  SpO2:  [98 %-100 %] 100 %  BP: (120-177)/(67-86) 129/72     Weight: 59.4 kg (131 lb)  Body mass index is 21.14 kg/m².    Intake/Output Summary (Last 24 hours) at 9/14/2018 1726  Last data filed at 9/14/2018 1013  Gross per 24 hour   Intake 2179.12 ml   Output 375 ml   Net 1804.12 ml      Physical Exam   Constitutional: He is oriented to person, place, and time. He appears well-developed and well-nourished.   HENT:   Head: Normocephalic and atraumatic.   Mouth/Throat: Oropharynx is clear and moist.   Eyes: EOM are normal. Pupils are equal, round, and reactive to light.   Neck: Normal range of motion. Neck supple. No JVD present.   Cardiovascular: Normal rate, regular rhythm, normal heart sounds and intact distal pulses.   Pulmonary/Chest: Effort normal and breath sounds normal. No respiratory distress.   Abdominal: Soft. Bowel sounds are normal. He exhibits no distension and no mass. There is no tenderness. There is no rebound and no guarding.   Musculoskeletal: Normal range of motion. He exhibits no edema.   Neurological: He is alert and oriented to person, place, and time.   Skin: Skin is warm and dry. Capillary refill takes less than 2 seconds. He is not diaphoretic.   Psychiatric: He has a normal mood and affect. His behavior is normal.       Significant Labs:   BMP:   Recent Labs   Lab  09/14/18   0409   GLU  89   NA  142   K  4.3   CL  111*   CO2  20*   BUN  72*   CREATININE  5.1*   CALCIUM  8.1*     CBC:   Recent Labs   Lab  09/14/18   0409   WBC  6.97   HGB  7.3*   HCT  22.2*   PLT  88*       Significant Imaging: I have reviewed and interpreted all pertinent imaging results/findings within the past 24 hours.    Assessment/Plan:      * Gastrointestinal hemorrhage    GI consulted  PPI BID  Stool  for occult blood  Clear liquid diet  Monitor CBC  IVF            Malnutrition of moderate degree    BOOST TID          Weight loss, unintentional      See above         Thrombocytopenia    Hold heparin/lovenox for DVT prophylaxis  ?h/o hepatitis B but treated 2010 - liver disease, chronic anemia, other chronic illness/malignancy?  Hepatitis and HIV - negative   Ivana/scd for DVT prophylaxis   Abdominal u/s pending             Acute blood loss anemia    Pt reports maroon and black stools  Taking anti-inflammatory meds for gout  Outpatient c/scope scheduled 10/9      GI consulted  Cbc in am  Transfuse one unit         Chronic gout of multiple sites    Uric acid level pending  Prednisone for acute flare  No NSAIDS or ibuprofen            Acute renal failure    10 months ago normal renal function  Differential includes dehydration, uropathy, NSAID use, ?h/o hbv   Renal ultrasound reviewed   IVF challenge  BMP in am  Avoid nephrotoxins  P/cr ratio, cardoso stain  Uric acid level   Nephrology consulted           Pure hypercholesterolemia    Resume statin         Essential (primary) hypertension    DC HCTZ as it can induce gout flares  Norvasc daily and titrate as needed             VTE Risk Mitigation (From admission, onward)        Ordered     IP VTE HIGH RISK PATIENT  Once      09/12/18 2341     Place IVANA hose  Until discontinued      09/12/18 2341     Place sequential compression device  Until discontinued      09/12/18 2341              Kiki Guzman MD  Department of Hospital Medicine   [facility]

## 2018-09-14 NOTE — ASSESSMENT & PLAN NOTE
Hold heparin/lovenox for DVT prophylaxis  ?h/o hepatitis B but treated 2010 - liver disease, chronic anemia, other chronic illness/malignancy?  Hepatitis and HIV - negative   Og/scd for DVT prophylaxis   Abdominal u/s pending

## 2018-09-14 NOTE — HOSPITAL COURSE
59 y/o male with gout presented with diarrhea and black stools.  Patient was noted to be anemic and with renal failure.  Transfused 3 units of blood with adequate correction of H/H.  GI consulted and patient underwent C scope with biopsies.  C scope noted pan colitis.  Biopsy pathology returned as inconclusive, but mentioned inflammatory bowel disease in differential.  Patient was started on Prednisone.  He has remained on Prednisone and will continue with a slow taper.  Patient to follow up with GI.  He has remained afebrile, mostly asymptomatic and hemodynamically stable.  Patient's Creat continued to worsen and Nephrology consulted.  Recommended kidney biopsy, but patient has thrombocytopenic.  Renal failure thought be secondary to patient being on Indocin for past year to treat his gout.  No improvement in renal failure and Nephrology recommending starting dialysis.  IR consulted and HD catheter placed.  Patient was started on dialysis with no complications.  H/H has dropped, but no evidence of bleeding.  Anemia probably secondary to renal failure.  Patient is completely asymptomatic and would not transfuse at this time.  Patient was started on Epo with dialysis.  SW consulted to arrange outpatient HD.  Patient will be discharged once this is arranged.  He will continue Prednisone taper.  He will follow up with PCP and GI.  He will continue dialysis as outpatient once arranged.

## 2018-09-14 NOTE — TRANSFER OF CARE
"Anesthesia Transfer of Care Note    Patient: Duran Mccauley    Procedure(s) Performed: Procedure(s) (LRB):  COLONOSCOPY (N/A)    Patient location: GI    Anesthesia Type: general    Transport from OR: Transported from OR on room air with adequate spontaneous ventilation    Post pain: adequate analgesia    Post assessment: no apparent anesthetic complications    Post vital signs: stable    Level of consciousness: awake, alert and oriented    Nausea/Vomiting: no nausea/vomiting    Complications: none    Transfer of care protocol was followed      Last vitals:   Visit Vitals  /69   Pulse 82   Temp 36.6 °C (97.9 °F)   Resp 16   Ht 5' 6" (1.676 m)   Wt 59.4 kg (131 lb)   SpO2 100%   BMI 21.14 kg/m²     "

## 2018-09-14 NOTE — ASSESSMENT & PLAN NOTE
10 months ago normal renal function  Differential includes dehydration, uropathy, NSAID use, ?h/o hbv   Renal ultrasound reviewed   IVF challenge  BMP in am  Avoid nephrotoxins  P/cr ratio, cardoso stain  Uric acid level   Nephrology consulted

## 2018-09-14 NOTE — PLAN OF CARE
Problem: Patient Care Overview  Goal: Plan of Care Review  Outcome: Ongoing (interventions implemented as appropriate)  Pt remains free of falls and injuries. Ambulates independently and frequently. TEDs to BLE. Denies any pain. On GoLytely prep for C-scope in AM. Tolerating well. Pt states BMs getting clearer. Family at bedside. IVFs cont as scheduled. No other complaints at this time.

## 2018-09-14 NOTE — ANESTHESIA PREPROCEDURE EVALUATION
09/14/2018  Duran Mccauley is a 58 y.o., male.    Anesthesia Evaluation    I have reviewed the Patient Summary Reports.    I have reviewed the Nursing Notes.   I have reviewed the Medications.     Review of Systems  Anesthesia Hx:  No problems with previous Anesthesia Denies Hx of Anesthetic complications  Neg history of prior surgery. Denies Family Hx of Anesthesia complications.   Denies Personal Hx of Anesthesia complications.   Social:  Non-Smoker, No Alcohol Use    Hematology/Oncology:  Hematology Normal   Oncology Normal     EENT/Dental:EENT/Dental Normal   Cardiovascular:   Exercise tolerance: good Hypertension hyperlipidemia    Pulmonary:  Pulmonary Normal    Renal/:   Chronic Renal Disease, ARF    Hepatic/GI:   Liver Disease, Hepatitis    Musculoskeletal:  Musculoskeletal Normal    Neurological:  Neurology Normal    Endocrine:  Endocrine Normal    Dermatological:  Skin Normal    Psych:  Psychiatric Normal           Physical Exam  General:  Well nourished    Airway/Jaw/Neck:  Airway Findings: Mouth Opening: Normal General Airway Assessment: Adult  Mallampati: II  TM Distance: Normal, at least 6 cm         Dental:  DENTAL FINDINGS: Normal   Chest/Lungs:  Chest/Lungs Clear    Heart/Vascular:  Heart Findings: Normal Heart murmur: negative       Mental Status:  Mental Status Findings:  Cooperative, Alert and Oriented         Anesthesia Plan  Type of Anesthesia, risks & benefits discussed:  Anesthesia Type:  general  Patient's Preference:   Intra-op Monitoring Plan:   Intra-op Monitoring Plan Comments:   Post Op Pain Control Plan:   Post Op Pain Control Plan Comments:   Induction:   IV  Beta Blocker:  Patient is not currently on a Beta-Blocker (No further documentation required).       Informed Consent: Patient understands risks and agrees with Anesthesia plan.  Questions answered. Anesthesia consent  signed with patient.  ASA Score: 2     Day of Surgery Review of History & Physical:            Ready For Surgery From Anesthesia Perspective.

## 2018-09-14 NOTE — PROVATION PATIENT INSTRUCTIONS
Discharge Summary/Instructions after an Endoscopic Procedure  Patient Name: Duran Mccauley  Patient MRN: [id number]  Patient YOB: 1960 Friday, September 14, 2018  Viri Blair MD  RESTRICTIONS:  During your procedure today, you received medications for sedation.  These   medications may affect your judgment, balance and coordination.  Therefore,   for 24 hours, you have the following restrictions:   - DO NOT drive a car, operate machinery, make legal/financial decisions,   sign important papers or drink alcohol.    ACTIVITY:  Today: no heavy lifting, straining or running due to procedural   sedation/anesthesia.  The following day: return to full activity including work.  DIET:  Eat and drink normally unless instructed otherwise.     TREATMENT FOR COMMON SIDE EFFECTS:  - Mild abdominal pain, nausea, belching, bloating or excessive gas:  rest,   eat lightly and use a heating pad.  - Sore Throat: treat with throat lozenges and/or gargle with warm salt   water.  - Because air was used during the procedure, expelling large amounts of air   from your rectum or belching is normal.  - If a bowel prep was taken, you may not have a bowel movement for 1-3 days.    This is normal.  SYMPTOMS TO WATCH FOR AND REPORT TO YOUR PHYSICIAN:  1. Abdominal pain or bloating, other than gas cramps.  2. Chest pain.  3. Back pain.  4. Signs of infection such as: chills or fever occurring within 24 hours   after the procedure.  5. Rectal bleeding, which would show as bright red, maroon, or black stools.   (A tablespoon of blood from the rectum is not serious, especially if   hemorrhoids are present.)  6. Vomiting.  7. Weakness or dizziness.  GO DIRECTLY TO THE NEAREST EMERGENCY ROOM IF YOU HAVE ANY OF THE FOLLOWING:      Difficulty breathing              Chills and/or fever over 101 F   Persistent vomiting and/or vomiting blood   Severe abdominal pain   Severe chest pain   Black, tarry stools   Bleeding- more than one  tablespoon   Any other symptom or condition that you feel may need urgent attention  Your doctor recommends these additional instructions:  If any biopsies were taken, your doctors clinic will contact you in 1 to 2   weeks with any results.  - Return patient to hospital [facility] for ongoing care.   - Resume previous diet today.   - Await pathology results. Will likely start prednisone 40 mg daily and   lialda 4.8 gm daily (or alternate ASA drug) if C.diff is negative and   pathology consistent with ulcerative colitis.  - Check stool for Clostridium difficile toxin if he has loose stools.  - The findings and recommendations were discussed with the patient and their   family.   - Repeat colonoscopy PRN to assess disease activity.  For questions, problems or results please call your physician - Viri Blair MD at Work:  [phone].  [facility] can be reached at [phone]     IF A COMPLICATION OR EMERGENCY SITUATION ARISES AND YOU ARE UNABLE TO REACH   YOUR PHYSICIAN - GO DIRECTLY TO THE EMERGENCY ROOM.  Viri Blair MD  9/14/2018 10:28:10 AM  This report has been verified and signed electronically.  [employer]ATION

## 2018-09-14 NOTE — CONSULTS
SARAH contacted [facility] to schedule follow-up, SARAH spoke to Stephanie that confirmed patient sees Dr. Smith, appointment scheduled for 9/26/18 @ 1:15pm.

## 2018-09-15 LAB
ANION GAP SERPL CALC-SCNC: 10 MMOL/L
BASOPHILS # BLD AUTO: 0.02 K/UL
BASOPHILS NFR BLD: 0.2 %
BUN SERPL-MCNC: 63 MG/DL
CALCIUM SERPL-MCNC: 8.2 MG/DL
CHLORIDE SERPL-SCNC: 112 MMOL/L
CO2 SERPL-SCNC: 18 MMOL/L
CREAT SERPL-MCNC: 5.7 MG/DL
DIFFERENTIAL METHOD: ABNORMAL
EOSINOPHIL # BLD AUTO: 0.4 K/UL
EOSINOPHIL NFR BLD: 4.2 %
ERYTHROCYTE [DISTWIDTH] IN BLOOD BY AUTOMATED COUNT: 16.2 %
EST. GFR  (AFRICAN AMERICAN): 12 ML/MIN/1.73 M^2
EST. GFR  (NON AFRICAN AMERICAN): 10 ML/MIN/1.73 M^2
GLUCOSE SERPL-MCNC: 99 MG/DL
HCT VFR BLD AUTO: 28.6 %
HGB BLD-MCNC: 9.9 G/DL
LYMPHOCYTES # BLD AUTO: 1 K/UL
LYMPHOCYTES NFR BLD: 11.6 %
MCH RBC QN AUTO: 30.4 PG
MCHC RBC AUTO-ENTMCNC: 34.6 G/DL
MCV RBC AUTO: 88 FL
MONOCYTES # BLD AUTO: 0.6 K/UL
MONOCYTES NFR BLD: 7.1 %
NEUTROPHILS # BLD AUTO: 6.4 K/UL
NEUTROPHILS NFR BLD: 81 %
NRBC BLD-RTO: 2 /100 WBC
PLATELET # BLD AUTO: 78 K/UL
PMV BLD AUTO: ABNORMAL FL
POLYCHROMASIA BLD QL SMEAR: ABNORMAL
POTASSIUM SERPL-SCNC: 4.4 MMOL/L
RBC # BLD AUTO: 3.26 M/UL
SODIUM SERPL-SCNC: 140 MMOL/L
WBC # BLD AUTO: 8.35 K/UL

## 2018-09-15 PROCEDURE — 85025 COMPLETE CBC W/AUTO DIFF WBC: CPT

## 2018-09-15 PROCEDURE — 11000001 HC ACUTE MED/SURG PRIVATE ROOM

## 2018-09-15 PROCEDURE — 63600175 PHARM REV CODE 636 W HCPCS: Performed by: HOSPITALIST

## 2018-09-15 PROCEDURE — 80048 BASIC METABOLIC PNL TOTAL CA: CPT

## 2018-09-15 PROCEDURE — 25000003 PHARM REV CODE 250: Performed by: HOSPITALIST

## 2018-09-15 PROCEDURE — C9113 INJ PANTOPRAZOLE SODIUM, VIA: HCPCS | Performed by: HOSPITALIST

## 2018-09-15 PROCEDURE — 36415 COLL VENOUS BLD VENIPUNCTURE: CPT

## 2018-09-15 RX ORDER — PREDNISONE 5 MG/1
5 TABLET ORAL ONCE
Status: DISCONTINUED | OUTPATIENT
Start: 2018-09-15 | End: 2018-09-15

## 2018-09-15 RX ADMIN — ATORVASTATIN CALCIUM 20 MG: 10 TABLET, FILM COATED ORAL at 08:09

## 2018-09-15 RX ADMIN — PANTOPRAZOLE SODIUM 40 MG: 40 INJECTION, POWDER, LYOPHILIZED, FOR SOLUTION INTRAVENOUS at 08:09

## 2018-09-15 RX ADMIN — PANTOPRAZOLE SODIUM 40 MG: 40 INJECTION, POWDER, LYOPHILIZED, FOR SOLUTION INTRAVENOUS at 09:09

## 2018-09-15 RX ADMIN — PREDNISONE 30 MG: 5 TABLET ORAL at 09:09

## 2018-09-15 RX ADMIN — AMLODIPINE BESYLATE 10 MG: 5 TABLET ORAL at 08:09

## 2018-09-15 NOTE — ANESTHESIA POSTPROCEDURE EVALUATION
"Anesthesia Post Evaluation    Patient: Duran Mccauley    Procedure(s) Performed: Procedure(s) (LRB):  COLONOSCOPY (N/A)    Final Anesthesia Type: general  Patient location during evaluation: GI PACU  Patient participation: Yes- Able to Participate  Level of consciousness: awake and alert, oriented and awake  Post-procedure vital signs: reviewed and stable  Airway patency: patent  PONV status at discharge: No PONV  Anesthetic complications: no      Cardiovascular status: blood pressure returned to baseline  Respiratory status: unassisted, spontaneous ventilation and room air  Hydration status: euvolemic  Follow-up not needed.        Visit Vitals  BP (!) 152/75 (BP Location: Left arm, Patient Position: Lying)   Pulse 89   Temp 37.3 °C (99.1 °F) (Oral)   Resp 18   Ht 5' 6" (1.676 m)   Wt 59.4 kg (131 lb)   SpO2 96%   BMI 21.14 kg/m²       Pain/Adriane Score: Pain Assessment Performed: Yes (9/15/2018 12:00 PM)  Presence of Pain: complains of pain/discomfort (9/15/2018 12:00 PM)  Adriane Score: 10 (9/14/2018 10:48 AM)        "

## 2018-09-15 NOTE — PROGRESS NOTES
"Progress Note    Subjective:No stool post colon and feels ok      BP (!) 152/75 (BP Location: Left arm, Patient Position: Lying)   Pulse 89   Temp 99.1 °F (37.3 °C) (Oral)   Resp 18   Ht 5' 6" (1.676 m)   Wt 59.4 kg (131 lb)   SpO2 96%   BMI 21.14 kg/m²     Current Facility-Administered Medications   Medication Dose Route Frequency Provider Last Rate Last Dose    0.9%  NaCl infusion (for blood administration)   Intravenous Q24H PRN Kiki Guzman MD        0.9%  NaCl infusion (for blood administration)   Intravenous Q24H PRN Kiki Guzman MD        amLODIPine tablet 10 mg  10 mg Oral Daily Kiki Guzman MD   10 mg at 09/15/18 0832    atorvastatin tablet 20 mg  20 mg Oral Daily Kiki Guzman MD   20 mg at 09/15/18 0832    pantoprazole injection 40 mg  40 mg Intravenous BID Kiki Guzman MD   40 mg at 09/15/18 0832       Objective:  gen alert no distress  Lung- clear bilaterally anterior and posterior  Heart-NSR s m or g  abd- soft,nontender,no rebound or guarding, no masses      Labs:  Lab Results   Component Value Date    WBC 8.35 09/15/2018    HGB 9.9 (L) 09/15/2018    HCT 28.6 (L) 09/15/2018    MCV 88 09/15/2018    PLT 78 (L) 09/15/2018       CMP  Recent Labs   Lab  09/15/18   0559   GLU  99   CALCIUM  8.2*   NA  140   K  4.4   CO2  18*   CL  112*   BUN  63*   CREATININE  5.7*       No results found for: LIPASE    No results found for: AMYLASE    Assessment:   1. Post colon with pan colonic ulceration suspicious for UC, need stool c diff to     be r/o        Plan:  1.Stat stool for c diff, once back po steroids,   2.cannot use 5ASA product with high renal function, please have renal see the    patient    "

## 2018-09-16 LAB
ANION GAP SERPL CALC-SCNC: 12 MMOL/L
BASOPHILS # BLD AUTO: 0.01 K/UL
BASOPHILS NFR BLD: 0.1 %
BUN SERPL-MCNC: 74 MG/DL
CALCIUM SERPL-MCNC: 8.2 MG/DL
CHLORIDE SERPL-SCNC: 103 MMOL/L
CO2 SERPL-SCNC: 21 MMOL/L
CREAT SERPL-MCNC: 7.3 MG/DL
DIFFERENTIAL METHOD: ABNORMAL
EOSINOPHIL # BLD AUTO: 0.2 K/UL
EOSINOPHIL NFR BLD: 2.4 %
ERYTHROCYTE [DISTWIDTH] IN BLOOD BY AUTOMATED COUNT: 16.8 %
EST. GFR  (AFRICAN AMERICAN): 9 ML/MIN/1.73 M^2
EST. GFR  (NON AFRICAN AMERICAN): 7 ML/MIN/1.73 M^2
GLUCOSE SERPL-MCNC: 75 MG/DL
HCT VFR BLD AUTO: 29.2 %
HGB BLD-MCNC: 9.9 G/DL
IRON SERPL-MCNC: 52 UG/DL
LYMPHOCYTES # BLD AUTO: 1.1 K/UL
LYMPHOCYTES NFR BLD: 13.3 %
MCH RBC QN AUTO: 30.2 PG
MCHC RBC AUTO-ENTMCNC: 33.9 G/DL
MCV RBC AUTO: 89 FL
MONOCYTES # BLD AUTO: 0.8 K/UL
MONOCYTES NFR BLD: 10.4 %
NEUTROPHILS # BLD AUTO: 5.8 K/UL
NEUTROPHILS NFR BLD: 77.3 %
PHOSPHATE SERPL-MCNC: 5.1 MG/DL
PLATELET # BLD AUTO: 62 K/UL
PLATELET BLD QL SMEAR: ABNORMAL
PMV BLD AUTO: ABNORMAL FL
POTASSIUM SERPL-SCNC: 3.8 MMOL/L
RBC # BLD AUTO: 3.28 M/UL
SATURATED IRON: 26 %
SODIUM SERPL-SCNC: 136 MMOL/L
TOTAL IRON BINDING CAPACITY: 198 UG/DL
TRANSFERRIN SERPL-MCNC: 134 MG/DL
WBC # BLD AUTO: 7.89 K/UL

## 2018-09-16 PROCEDURE — 36415 COLL VENOUS BLD VENIPUNCTURE: CPT

## 2018-09-16 PROCEDURE — 84100 ASSAY OF PHOSPHORUS: CPT

## 2018-09-16 PROCEDURE — 85025 COMPLETE CBC W/AUTO DIFF WBC: CPT

## 2018-09-16 PROCEDURE — 63600175 PHARM REV CODE 636 W HCPCS: Performed by: HOSPITALIST

## 2018-09-16 PROCEDURE — 80048 BASIC METABOLIC PNL TOTAL CA: CPT

## 2018-09-16 PROCEDURE — 83970 ASSAY OF PARATHORMONE: CPT

## 2018-09-16 PROCEDURE — C9113 INJ PANTOPRAZOLE SODIUM, VIA: HCPCS | Performed by: HOSPITALIST

## 2018-09-16 PROCEDURE — 11000001 HC ACUTE MED/SURG PRIVATE ROOM

## 2018-09-16 PROCEDURE — 25000003 PHARM REV CODE 250: Performed by: HOSPITALIST

## 2018-09-16 PROCEDURE — 82595 ASSAY OF CRYOGLOBULIN: CPT

## 2018-09-16 PROCEDURE — 83540 ASSAY OF IRON: CPT

## 2018-09-16 PROCEDURE — 82728 ASSAY OF FERRITIN: CPT

## 2018-09-16 RX ORDER — PREDNISONE 20 MG/1
40 TABLET ORAL DAILY
Status: DISCONTINUED | OUTPATIENT
Start: 2018-09-16 | End: 2018-09-19

## 2018-09-16 RX ADMIN — PANTOPRAZOLE SODIUM 40 MG: 40 INJECTION, POWDER, LYOPHILIZED, FOR SOLUTION INTRAVENOUS at 08:09

## 2018-09-16 RX ADMIN — PREDNISONE 40 MG: 20 TABLET ORAL at 12:09

## 2018-09-16 RX ADMIN — ATORVASTATIN CALCIUM 20 MG: 10 TABLET, FILM COATED ORAL at 09:09

## 2018-09-16 RX ADMIN — PANTOPRAZOLE SODIUM 40 MG: 40 INJECTION, POWDER, LYOPHILIZED, FOR SOLUTION INTRAVENOUS at 09:09

## 2018-09-16 RX ADMIN — AMLODIPINE BESYLATE 10 MG: 5 TABLET ORAL at 09:09

## 2018-09-16 NOTE — CONSULTS
58 y o aam with hx of gout who has been on indocin daily for ten months admitted with dennis for which renal consult was requested.    Denies dysuria hematuria or frequency. Denies prior hx of dennis/ckd and denies hx of k bx or HD. Did take indocin and ibuprofen regularly.    Has hx of colitis, hematochtzia and hepb    Awake alert oriented NAD    Denies CNS ENT CP GI RHEUM OR DERM SX  Past Medical History:   Diagnosis Date    Eczema     Hepatitis B     treated 2010s and negative RNA load afterward    Hyperlipidemia     Hypertension      Review of patient's allergies indicates:  No Known Allergies    Current Facility-Administered Medications   Medication    0.9%  NaCl infusion (for blood administration)    0.9%  NaCl infusion (for blood administration)    amLODIPine tablet 10 mg    atorvastatin tablet 20 mg    pantoprazole injection 40 mg       LABS    Recent Results (from the past 24 hour(s))   CBC auto differential    Collection Time: 09/15/18  5:59 AM   Result Value Ref Range    WBC 8.35 3.90 - 12.70 K/uL    RBC 3.26 (L) 4.60 - 6.20 M/uL    Hemoglobin 9.9 (L) 14.0 - 18.0 g/dL    Hematocrit 28.6 (L) 40.0 - 54.0 %    MCV 88 82 - 98 fL    MCH 30.4 27.0 - 31.0 pg    MCHC 34.6 32.0 - 36.0 g/dL    RDW 16.2 (H) 11.5 - 14.5 %    Platelets 78 (L) 150 - 350 K/uL    MPV SEE COMMENT 9.2 - 12.9 fL    Gran # (ANC) 6.4 1.8 - 7.7 K/uL    Lymph # 1.0 1.0 - 4.8 K/uL    Mono # 0.6 0.3 - 1.0 K/uL    Eos # 0.4 0.0 - 0.5 K/uL    Baso # 0.02 0.00 - 0.20 K/uL    nRBC 2 (A) 0 /100 WBC    Gran% 81.0 (H) 38.0 - 73.0 %    Lymph% 11.6 (L) 18.0 - 48.0 %    Mono% 7.1 4.0 - 15.0 %    Eosinophil% 4.2 0.0 - 8.0 %    Basophil% 0.2 0.0 - 1.9 %    Poly Occasional     Differential Method Automated    Basic metabolic panel    Collection Time: 09/15/18  5:59 AM   Result Value Ref Range    Sodium 140 136 - 145 mmol/L    Potassium 4.4 3.5 - 5.1 mmol/L    Chloride 112 (H) 95 - 110 mmol/L    CO2 18 (L) 23 - 29 mmol/L    Glucose 99 70 - 110 mg/dL    BUN,  Bld 63 (H) 6 - 20 mg/dL    Creatinine 5.7 (H) 0.5 - 1.4 mg/dL    Calcium 8.2 (L) 8.7 - 10.5 mg/dL    Anion Gap 10 8 - 16 mmol/L    eGFR if African American 12 (A) >60 mL/min/1.73 m^2    eGFR if non African American 10 (A) >60 mL/min/1.73 m^2   ]    I/O last 3 completed shifts:  In: 1560 [P.O.:720; I.V.:500; Blood:340]  Out: 1100 [Urine:1100]    Vitals:    09/15/18 0405 09/15/18 0738 09/15/18 1106 09/15/18 1644   BP: 139/71 (!) 150/89 (!) 152/75 131/80   Pulse: 82 82 89 91   Resp: 18 18 18 18   Temp: 99.2 °F (37.3 °C) 99.5 °F (37.5 °C) 99.1 °F (37.3 °C) 97 °F (36.1 °C)   TempSrc: Oral Oral Oral Oral   SpO2: 99% 100% 96% 100%   Weight:       Height:           No Jvd, Thyromegaly or Lymphadenopathy  Lungs: Fairly clear anteriorly and laterally  Cor: RRR no G or rubs  Abd: Soft benign good bowel sounds non tender  Ext: No E C C    A)  dennis   Met acidosis  Anemia  Gout  Hx of hep b  Smallish kidneys on sono  Severe proteinuria >5 grams  Protein malnutrition  Thrombocytopenia    P)    Stop all nsaids  Avoid nephrotoxic meds  Ideally a Kidney bx should be done for his proteinuria but his kidneys are smallish and he has thrombocytopenia  Renal diet  Protect L arm  Use allopurinol and colchicine for gout  ? Heme consult  Check cryoglobulins

## 2018-09-16 NOTE — PROGRESS NOTES
"Progress Note    Subjective: patient reasonably comfortable overnight with no bloody stool. C diff collected last pm      /75 (BP Location: Left arm, Patient Position: Lying)   Pulse 73   Temp 98.1 °F (36.7 °C) (Oral)   Resp 18   Ht 5' 6" (1.676 m)   Wt 59.4 kg (131 lb)   SpO2 99%   BMI 21.14 kg/m²     Current Facility-Administered Medications   Medication Dose Route Frequency Provider Last Rate Last Dose    0.9%  NaCl infusion (for blood administration)   Intravenous Q24H PRN Kiki Guzman MD        0.9%  NaCl infusion (for blood administration)   Intravenous Q24H PRN Kiki Guzman MD        amLODIPine tablet 10 mg  10 mg Oral Daily Kiki Guzman MD   10 mg at 09/15/18 0832    atorvastatin tablet 20 mg  20 mg Oral Daily Kiki Guzman MD   20 mg at 09/15/18 0832    pantoprazole injection 40 mg  40 mg Intravenous BID Kiki Guzman MD   40 mg at 09/15/18 2126       Objective:  gen alert no distress  abd- soft,nontender,no rebound or guarding, no masses      Labs:  Lab Results   Component Value Date    WBC 8.35 09/15/2018    HGB 9.9 (L) 09/15/2018    HCT 28.6 (L) 09/15/2018    MCV 88 09/15/2018    PLT 78 (L) 09/15/2018       CMP  No results for input(s): GLU, CALCIUM, ALBUMIN, PROT, NA, K, CO2, CL, BUN, CREATININE, ALKPHOS, ALT, AST, BILITOT in the last 24 hours.    No results found for: LIPASE    No results found for: AMYLASE    Assessment:   1. dennis  2. Post colon with pan colonic ulceration suspicious for UC, need stool c diff to     be r/o  3. Relatively free of abd cramps        Plan:  1. Stool collected last night   2.NO 5 ASA with renal failure new  3. Hold steroids till stool back    "

## 2018-09-16 NOTE — PROGRESS NOTES
"[facility]  Progress Note    Patient Name: Duran Mccauley  MRN: [id number]  Patient Class: IP- Inpatient   Admission Date: 9/12/2018  Length of Stay: 4 days  Attending Physician: Kiki Guzman MD  Primary Care Provider: Patrick Cook MD        Subjective:     Principal Problem:Gastrointestinal hemorrhage    HPI:  57 yo male with HNT, h/o HBV, chronic gout and HLP presented with c/o dark black stools x 5 days, fatigue and weakness. He went to PCP/NP 2 days ago and was told to take "medication" for stools and prednisone for gout flare. He was set up for outpatient c/scope 10/9.  He was concerned about his decreased appetite, diarrhea and weight loss. He denies abdominal pain, hematemesis, fever, chills, N/V.  He was taking indomethicin and ibuprofen intermittently for gout flares over past year. On admit h/h 7.5/21.3.  Coags normal. BUN/Cr 78/5.8.  Albumin 2.9.  GI consulted. Renal u/s pending. Transfusing one unit blood.     Hospital Course:  Pt admitted with ARF and anemia/diarrhea. Underwent c/scope 9/13. Transfuse one unit with inappropriate response in h/h. Plan to transfuse two units PRBCs today. Biopsy pending +/- steroids and mesalamine. Nephrology consulted, marginal improvement in renal function. Serum uric acid level elevated and urine level normal. No acute flare appreciated.     H/h improved; nephrology counseled on diet for gout; monitoring labs; gout flare to right wrist- started on prednisone     Interval History: gout flare to right wrist    Review of Systems   Constitutional: Positive for activity change, appetite change and fatigue.   HENT: Negative.    Eyes: Negative.    Respiratory: Negative.    Cardiovascular: Negative.    Gastrointestinal: Positive for blood in stool and diarrhea.   Endocrine: Negative.    Genitourinary: Negative.    Musculoskeletal: Positive for arthralgias.   Neurological: Negative.    Psychiatric/Behavioral: Negative.  "     Objective:     Vital Signs (Most Recent):  Temp: 98.1 °F (36.7 °C) (09/16/18 0844)  Pulse: 73 (09/16/18 0844)  Resp: 18 (09/16/18 0844)  BP: 123/75 (09/16/18 0844)  SpO2: 99 % (09/16/18 0844) Vital Signs (24h Range):  Temp:  [97 °F (36.1 °C)-99.9 °F (37.7 °C)] 98.1 °F (36.7 °C)  Pulse:  [73-91] 73  Resp:  [18] 18  SpO2:  [96 %-100 %] 99 %  BP: (121-152)/(58-80) 123/75     Weight: 59.4 kg (131 lb)  Body mass index is 21.14 kg/m².    Intake/Output Summary (Last 24 hours) at 9/16/2018 1033  Last data filed at 9/16/2018 0900  Gross per 24 hour   Intake 720 ml   Output 800 ml   Net -80 ml      Physical Exam   Constitutional: He is oriented to person, place, and time. He appears well-developed and well-nourished.   HENT:   Head: Normocephalic and atraumatic.   Mouth/Throat: Oropharynx is clear and moist.   Eyes: EOM are normal. Pupils are equal, round, and reactive to light.   Neck: Normal range of motion. Neck supple. No JVD present.   Cardiovascular: Normal rate, regular rhythm, normal heart sounds and intact distal pulses.   Pulmonary/Chest: Effort normal and breath sounds normal. No respiratory distress.   Abdominal: Soft. Bowel sounds are normal. He exhibits no distension and no mass. There is no tenderness. There is no rebound and no guarding.   Musculoskeletal: Normal range of motion. He exhibits no edema.   Neurological: He is alert and oriented to person, place, and time.   Skin: Skin is warm and dry. Capillary refill takes less than 2 seconds. He is not diaphoretic.   Psychiatric: He has a normal mood and affect. His behavior is normal.       Significant Labs: All pertinent labs within the past 24 hours have been reviewed.    Significant Imaging: I have reviewed and interpreted all pertinent imaging results/findings within the past 24 hours.    Assessment/Plan:      * Gastrointestinal hemorrhage    GI consulted  PPI BID  Stool for occult blood  Clear liquid diet  Monitor CBC  IVF            Malnutrition of  moderate degree    BOOST TID          Weight loss, unintentional      See above         Thrombocytopenia    Hold heparin/lovenox for DVT prophylaxis  ?h/o hepatitis B but treated 2010 - liver disease, chronic anemia, other chronic illness/malignancy?  Hepatitis and HIV - negative   Ivana/scd for DVT prophylaxis   Abdominal u/s pending             Acute blood loss anemia    Pt reports maroon and black stools  Taking anti-inflammatory meds for gout  Outpatient c/scope scheduled 10/9      GI consulted  Cbc in am  Transfuse one unit         Chronic gout of multiple sites    Uric acid level 13.4  Prednisone for acute flare  No NSAIDS or ibuprofen or indomethicin             Acute renal failure    10 months ago normal renal function  Differential includes dehydration, uropathy, NSAID use, ?h/o hbv   Renal ultrasound reviewed   IVF challenge  BMP in am  Avoid nephrotoxins  P/cr ratio, cardoso stain  Uric acid level   Nephrology consulted           Pure hypercholesterolemia    Resume statin         Essential (primary) hypertension    DC HCTZ as it can induce gout flares  Norvasc daily and titrate as needed             VTE Risk Mitigation (From admission, onward)        Ordered     IP VTE HIGH RISK PATIENT  Once      09/12/18 2341     Place IVANA hose  Until discontinued      09/12/18 2341     Place sequential compression device  Until discontinued      09/12/18 2341              Kiki Guzman MD  Department of Hospital Medicine   [facility] [place]

## 2018-09-16 NOTE — PLAN OF CARE
Problem: Patient Care Overview  Goal: Plan of Care Review  Outcome: Ongoing (interventions implemented as appropriate)   09/16/18 1605   Coping/Psychosocial   Plan Of Care Reviewed With patient   Patient is awake alert and oriented. Denies pain. Tolerating po intake.  Continue with plan of care as ordered. No distress noted.     Problem: Fall Risk (Adult)  Goal: Absence of Falls  Patient will demonstrate the desired outcomes by discharge/transition of care.  Outcome: Ongoing (interventions implemented as appropriate)   09/16/18 1605   Fall Risk (Adult)   Absence of Falls making progress toward outcome   Hourly rounds, ambulating to BR, sitting in chair, call light in reach instructed to call for assist if needed. Free of falls

## 2018-09-16 NOTE — SUBJECTIVE & OBJECTIVE
Interval History: gout flare to right wrist    Review of Systems   Constitutional: Positive for activity change, appetite change and fatigue.   HENT: Negative.    Eyes: Negative.    Respiratory: Negative.    Cardiovascular: Negative.    Gastrointestinal: Positive for blood in stool and diarrhea.   Endocrine: Negative.    Genitourinary: Negative.    Musculoskeletal: Positive for arthralgias.   Neurological: Negative.    Psychiatric/Behavioral: Negative.      Objective:     Vital Signs (Most Recent):  Temp: 98.1 °F (36.7 °C) (09/16/18 0844)  Pulse: 73 (09/16/18 0844)  Resp: 18 (09/16/18 0844)  BP: 123/75 (09/16/18 0844)  SpO2: 99 % (09/16/18 0844) Vital Signs (24h Range):  Temp:  [97 °F (36.1 °C)-99.9 °F (37.7 °C)] 98.1 °F (36.7 °C)  Pulse:  [73-91] 73  Resp:  [18] 18  SpO2:  [96 %-100 %] 99 %  BP: (121-152)/(58-80) 123/75     Weight: 59.4 kg (131 lb)  Body mass index is 21.14 kg/m².    Intake/Output Summary (Last 24 hours) at 9/16/2018 1033  Last data filed at 9/16/2018 0900  Gross per 24 hour   Intake 720 ml   Output 800 ml   Net -80 ml      Physical Exam   Constitutional: He is oriented to person, place, and time. He appears well-developed and well-nourished.   HENT:   Head: Normocephalic and atraumatic.   Mouth/Throat: Oropharynx is clear and moist.   Eyes: EOM are normal. Pupils are equal, round, and reactive to light.   Neck: Normal range of motion. Neck supple. No JVD present.   Cardiovascular: Normal rate, regular rhythm, normal heart sounds and intact distal pulses.   Pulmonary/Chest: Effort normal and breath sounds normal. No respiratory distress.   Abdominal: Soft. Bowel sounds are normal. He exhibits no distension and no mass. There is no tenderness. There is no rebound and no guarding.   Musculoskeletal: Normal range of motion. He exhibits no edema.   Neurological: He is alert and oriented to person, place, and time.   Skin: Skin is warm and dry. Capillary refill takes less than 2 seconds. He is not  diaphoretic.   Psychiatric: He has a normal mood and affect. His behavior is normal.       Significant Labs: All pertinent labs within the past 24 hours have been reviewed.    Significant Imaging: I have reviewed and interpreted all pertinent imaging results/findings within the past 24 hours.

## 2018-09-16 NOTE — PLAN OF CARE
Problem: Patient Care Overview  Goal: Plan of Care Review  Outcome: Ongoing (interventions implemented as appropriate)  Pt AAO resting well in bed no distress noted; pt given IV scheduled meds w/out difficulty;  Pt complaint of discomfort of right hand, pt refused pain med; family, wife and daughter, remained at bedside; call light w/in reach bed locked in lowest position side rails up x2 will cont to monitor

## 2018-09-17 LAB
ANION GAP SERPL CALC-SCNC: 10 MMOL/L
BASOPHILS # BLD AUTO: 0.02 K/UL
BASOPHILS NFR BLD: 0.2 %
BUN SERPL-MCNC: 90 MG/DL
CALCIUM SERPL-MCNC: 8.6 MG/DL
CHLORIDE SERPL-SCNC: 101 MMOL/L
CO2 SERPL-SCNC: 21 MMOL/L
CREAT SERPL-MCNC: 8.6 MG/DL
DIFFERENTIAL METHOD: ABNORMAL
EOSINOPHIL # BLD AUTO: 0 K/UL
EOSINOPHIL NFR BLD: 0.2 %
ERYTHROCYTE [DISTWIDTH] IN BLOOD BY AUTOMATED COUNT: 16 %
EST. GFR  (AFRICAN AMERICAN): 7 ML/MIN/1.73 M^2
EST. GFR  (NON AFRICAN AMERICAN): 6 ML/MIN/1.73 M^2
FERRITIN SERPL-MCNC: 908 NG/ML
GLUCOSE SERPL-MCNC: 118 MG/DL
HCT VFR BLD AUTO: 31.3 %
HGB BLD-MCNC: 10.9 G/DL
LYMPHOCYTES # BLD AUTO: 0.7 K/UL
LYMPHOCYTES NFR BLD: 5.9 %
MCH RBC QN AUTO: 30.4 PG
MCHC RBC AUTO-ENTMCNC: 34.8 G/DL
MCV RBC AUTO: 87 FL
MONOCYTES # BLD AUTO: 0.4 K/UL
MONOCYTES NFR BLD: 3.4 %
NEUTROPHILS # BLD AUTO: 10.3 K/UL
NEUTROPHILS NFR BLD: 90.3 %
PLATELET # BLD AUTO: 76 K/UL
PMV BLD AUTO: ABNORMAL FL
POTASSIUM SERPL-SCNC: 4.3 MMOL/L
PTH-INTACT SERPL-MCNC: 407.1 PG/ML
RBC # BLD AUTO: 3.59 M/UL
SODIUM SERPL-SCNC: 132 MMOL/L
WBC # BLD AUTO: 11.4 K/UL

## 2018-09-17 PROCEDURE — 36415 COLL VENOUS BLD VENIPUNCTURE: CPT

## 2018-09-17 PROCEDURE — 63600175 PHARM REV CODE 636 W HCPCS: Performed by: HOSPITALIST

## 2018-09-17 PROCEDURE — 85025 COMPLETE CBC W/AUTO DIFF WBC: CPT

## 2018-09-17 PROCEDURE — 80048 BASIC METABOLIC PNL TOTAL CA: CPT

## 2018-09-17 PROCEDURE — C9113 INJ PANTOPRAZOLE SODIUM, VIA: HCPCS | Performed by: HOSPITALIST

## 2018-09-17 PROCEDURE — 11000001 HC ACUTE MED/SURG PRIVATE ROOM

## 2018-09-17 PROCEDURE — 25000003 PHARM REV CODE 250: Performed by: HOSPITALIST

## 2018-09-17 PROCEDURE — 25000003 PHARM REV CODE 250: Performed by: INTERNAL MEDICINE

## 2018-09-17 RX ORDER — CALCITRIOL 0.25 UG/1
0.25 CAPSULE ORAL DAILY
Status: DISCONTINUED | OUTPATIENT
Start: 2018-09-17 | End: 2018-09-24 | Stop reason: HOSPADM

## 2018-09-17 RX ADMIN — PANTOPRAZOLE SODIUM 40 MG: 40 INJECTION, POWDER, LYOPHILIZED, FOR SOLUTION INTRAVENOUS at 08:09

## 2018-09-17 RX ADMIN — PANTOPRAZOLE SODIUM 40 MG: 40 INJECTION, POWDER, LYOPHILIZED, FOR SOLUTION INTRAVENOUS at 09:09

## 2018-09-17 RX ADMIN — PREDNISONE 40 MG: 20 TABLET ORAL at 08:09

## 2018-09-17 RX ADMIN — AMLODIPINE BESYLATE 10 MG: 5 TABLET ORAL at 08:09

## 2018-09-17 RX ADMIN — ATORVASTATIN CALCIUM 20 MG: 10 TABLET, FILM COATED ORAL at 08:09

## 2018-09-17 RX ADMIN — CALCITRIOL 0.25 MCG: 0.25 CAPSULE, LIQUID FILLED ORAL at 12:09

## 2018-09-17 NOTE — PLAN OF CARE
Problem: Patient Care Overview  Goal: Plan of Care Review   09/17/18 0502   Coping/Psychosocial   Plan Of Care Reviewed With patient       Problem: Fall Risk (Adult)  Goal: Identify Related Risk Factors and Signs and Symptoms  Related risk factors and signs and symptoms are identified upon initiation of Human Response Clinical Practice Guideline (CPG)   09/17/18 0744   Fall Risk   Related Risk Factors (Fall Risk) environment unfamiliar   Signs and Symptoms (Fall Risk) presence of risk factors       Problem: Gastrointestinal Bleeding (Adult)  Goal: Signs and Symptoms of Listed Potential Problems Will be Absent, Minimized or Managed (Gastrointestinal Bleeding)  Signs and symptoms of listed potential problems will be absent, minimized or managed by discharge/transition of care (reference Gastrointestinal Bleeding (Adult) CPG).   09/17/18 0744   Gastrointestinal Bleeding   Problems Assessed (GI Bleeding) all   Problems Present (GI Bleeding) none

## 2018-09-17 NOTE — PROGRESS NOTES
Gastroenterology Progress Note    Reason for Consult: diarrhea, rectal bleeding    Subjective:  Patient is doing well.  Has no complaints today.  Has been up walking in the halls.  Having bowel movements.  Urinating.  No abdominal pain.  Prednisone helped with diarrhea/rectal bleeding.    ROS:  Gen: no F/C  CV: no CP/palpitations  Resp: no SOB/wheezing    Physical Exam  Vitals:    09/17/18 1105   BP: (!) 148/79   Pulse: 95   Resp: 18   Temp: 98.4 °F (36.9 °C)     Physical Exam   Constitutional: He is oriented to person, place, and time. He appears well-developed and well-nourished. No distress.   HENT:   Head: Normocephalic and atraumatic.   Mouth/Throat: Oropharynx is clear and moist.   Eyes: EOM are normal. Pupils are equal, round, and reactive to light. No scleral icterus.   Cardiovascular: Normal rate, regular rhythm and normal heart sounds.   No murmur heard.  Pulmonary/Chest: Effort normal and breath sounds normal. No respiratory distress.   Abdominal: Soft. Bowel sounds are normal. He exhibits no distension. There is no tenderness. There is no rebound and no guarding.   Musculoskeletal: Normal range of motion. He exhibits no edema.   Neurological: He is alert and oriented to person, place, and time.   Skin: Skin is warm and dry. No rash noted.   Vitals reviewed.      Medications/Infusions:  Current Facility-Administered Medications   Medication Dose Route Frequency Provider Last Rate Last Dose    0.9%  NaCl infusion (for blood administration)   Intravenous Q24H PRN Kiki Guzman MD        0.9%  NaCl infusion (for blood administration)   Intravenous Q24H PRN Kiki Guzman MD        amLODIPine tablet 10 mg  10 mg Oral Daily Kiki Guzman MD   10 mg at 09/17/18 0847    atorvastatin tablet 20 mg  20 mg Oral Daily Kiki Guzman MD   20 mg at 09/17/18 0848    calcitRIOL capsule 0.25 mcg  0.25 mcg Oral Daily Jono Miller MD   0.25 mcg at 09/17/18 1233    pantoprazole injection 40  mg  40 mg Intravenous BID Kiki Guzman MD   40 mg at 09/17/18 0848    predniSONE tablet 40 mg  40 mg Oral Daily Kiki Guzman MD   40 mg at 09/17/18 0847       Intake and Output:    Intake/Output Summary (Last 24 hours) at 9/17/2018 1401  Last data filed at 9/16/2018 1720  Gross per 24 hour   Intake 240 ml   Output --   Net 240 ml       Labs:  Lab Results   Component Value Date/Time    WBC 11.40 09/17/2018 12:14 PM    HGB 10.9 (L) 09/17/2018 12:14 PM    HCT 31.3 (L) 09/17/2018 12:14 PM    PLT 76 (L) 09/17/2018 12:14 PM    MCV 87 09/17/2018 12:14 PM     (L) 09/17/2018 12:14 PM    K 4.3 09/17/2018 12:14 PM     09/17/2018 12:14 PM    CO2 21 (L) 09/17/2018 12:14 PM    BUN 90 (H) 09/17/2018 12:14 PM    CREATININE 8.6 (H) 09/17/2018 12:14 PM     (H) 09/17/2018 12:14 PM    CALCIUM 8.6 (L) 09/17/2018 12:14 PM    PHOS 5.1 (H) 09/16/2018 05:53 AM    INR 1.0 09/12/2018 05:23 PM   ]  Lab Results   Component Value Date/Time    PROT 5.7 (L) 09/14/2018 04:09 AM    ALBUMIN 2.5 (L) 09/14/2018 04:09 AM    BILITOT 1.3 (H) 09/14/2018 04:09 AM    AST 30 09/14/2018 04:09 AM    ALT 11 09/14/2018 04:09 AM    ALKPHOS 68 09/14/2018 04:09 AM   ]    Imaging and Procedures:  Labs and imaging results were reviewed.  RUQ U/S 9/14/18:  Cholelithiasis.  Mild common duct dilatation with echogenic material seen within the common duct which may represent sludge versus retained stone.    Retroperitoneal U/S 8/13/18:  No significant abnormality.    Assessment:  Duran Mccauley is a 58 y.o. male with a history of Hep B which was treated who presented with diarrhea and rectal bleeding, with likely ulcerative pan colitis on colonoscopy.    Plan:  - continue prednisone 40 mg daily  - still waiting on pathology results  - patient to follow up with Dr. Smith in clinic on 9/26/18 - can discuss additional therapy at that time  - if he is started on biologics, may need Hep B prophylaxis since he is core Ab positive    Viri  Guider, MD

## 2018-09-17 NOTE — CONSULTS
Food & Nutrition  Education    Diet Education: Renal diet; predialysis  Time Spent: 15 min  Learners: Patient and daughter      Nutrition Education provided with handouts: Renal diet for non-dialysis pts tips; Potassium and Phos lists.    Comments: Pt aware of foods related to gout. Discussed renal diet precautions and identified foods of concern. Discussed need to f/u with nephrologist concerning K/Phos restrictions, but discussed foods to limit/avoid and portion control. Encouraged low sodium diet to aid with fluid retention issues. Discussed adequate protein intake. Pt receptive and daughter with many questions. Provided f/u outpatient resources and online education sources to aid with further meal planning. Pt reports appetite improved, tolerating diet. No issues with n/v. Does not want supplements.       All questions and concerns answered. Dietitian's contact information provided.       Follow-Up: LOS date.    Please Re-consult as needed    Thanks!    Shawnee Feng RD

## 2018-09-17 NOTE — PROGRESS NOTES
Awake alert oriented NAD    Denies CNS ENT CP GI  RHEUM OR DERM SX  Past Medical History:   Diagnosis Date    Eczema     Hepatitis B     treated 2010s and negative RNA load afterward    Hyperlipidemia     Hypertension      Review of patient's allergies indicates:  No Known Allergies    Current Facility-Administered Medications   Medication    0.9%  NaCl infusion (for blood administration)    0.9%  NaCl infusion (for blood administration)    amLODIPine tablet 10 mg    atorvastatin tablet 20 mg    pantoprazole injection 40 mg    predniSONE tablet 40 mg       LABS    No results found for this or any previous visit (from the past 24 hour(s)).]    I/O last 3 completed shifts:  In: 720 [P.O.:720]  Out: 500 [Urine:500]    Vitals:    09/16/18 2310 09/17/18 0437 09/17/18 0706 09/17/18 1105   BP: (!) 149/91 (!) 144/80 (!) 143/80 (!) 148/79   Pulse: 84 81 84 95   Resp: 18 18 18 18   Temp: 97.3 °F (36.3 °C) 98.6 °F (37 °C) 97.7 °F (36.5 °C) 98.4 °F (36.9 °C)   TempSrc: Oral Oral     SpO2: 99% 95% 100% 100%   Weight:       Height:           No Jvd, Thyromegaly or Lymphadenopathy  Lungs: Fairly clear anteriorly and laterally  Cor: RRR no G or rubs  Abd: Soft benign good bowel sounds non tender  Ext: No E C C    A)    dennis borderline uo  Met acidosis  Anemia  Gout  Hx of hep b  Smallish kidneys on sono  Severe proteinuria >5 grams  Protein malnutrition  Thrombocytopenia  High pth     P)  Same recommendations but will have to add rocaltrol

## 2018-09-17 NOTE — SUBJECTIVE & OBJECTIVE
Interval History: pt denies bloody BMs, reports stools are formed     Review of Systems   Constitutional: Positive for activity change, appetite change and fatigue.   HENT: Negative.    Eyes: Negative.    Respiratory: Negative.    Cardiovascular: Negative.    Gastrointestinal: Positive for blood in stool and diarrhea.   Endocrine: Negative.    Genitourinary: Negative.    Musculoskeletal: Positive for arthralgias.   Neurological: Negative.    Psychiatric/Behavioral: Negative.      Objective:     Vital Signs (Most Recent):  Temp: 97.7 °F (36.5 °C) (09/17/18 0706)  Pulse: 84 (09/17/18 0706)  Resp: 18 (09/17/18 0706)  BP: (!) 143/80 (09/17/18 0706)  SpO2: 100 % (09/17/18 0706) Vital Signs (24h Range):  Temp:  [97.3 °F (36.3 °C)-98.6 °F (37 °C)] 97.7 °F (36.5 °C)  Pulse:  [73-88] 84  Resp:  [17-18] 18  SpO2:  [95 %-100 %] 100 %  BP: (123-149)/(74-91) 143/80     Weight: 59.4 kg (131 lb)  Body mass index is 21.14 kg/m².    Intake/Output Summary (Last 24 hours) at 9/17/2018 0707  Last data filed at 9/16/2018 1720  Gross per 24 hour   Intake 720 ml   Output 500 ml   Net 220 ml      Physical Exam   Constitutional: He is oriented to person, place, and time. He appears well-developed and well-nourished.   HENT:   Head: Normocephalic and atraumatic.   Mouth/Throat: Oropharynx is clear and moist.   Eyes: EOM are normal. Pupils are equal, round, and reactive to light.   Neck: Normal range of motion. Neck supple. No JVD present.   Cardiovascular: Normal rate, regular rhythm, normal heart sounds and intact distal pulses.   Pulmonary/Chest: Effort normal and breath sounds normal. No respiratory distress.   Abdominal: Soft. Bowel sounds are normal. He exhibits no distension and no mass. There is no tenderness. There is no rebound and no guarding.   Musculoskeletal: Normal range of motion. He exhibits no edema.   Neurological: He is alert and oriented to person, place, and time.   Skin: Skin is warm and dry. Capillary refill takes less  than 2 seconds. He is not diaphoretic.   Psychiatric: He has a normal mood and affect. His behavior is normal.       Significant Labs: All pertinent labs within the past 24 hours have been reviewed.    Significant Imaging: I have reviewed and interpreted all pertinent imaging results/findings within the past 24 hours.

## 2018-09-17 NOTE — PLAN OF CARE
Problem: Patient Care Overview  Goal: Plan of Care Review  Outcome: Ongoing (interventions implemented as appropriate)  Pt AAO resting well in bed no complaint of pain, no s/s of distress; pt amb in halls independently free from fall/injury during shift; scheduled meds given w/out difficulty; call light within reach bed locked in lowest position will cont to monitor

## 2018-09-17 NOTE — PROGRESS NOTES
"[facility] Medicine  Progress Note    Patient Name: Duran Mccauley  MRN: [id number]  Patient Class: IP- Inpatient   Admission Date: 9/12/2018  Length of Stay: 5 days  Attending Physician: Kiki Guzman MD  Primary Care Provider: Patrick Cook MD        Subjective:     Principal Problem:Gastrointestinal hemorrhage    HPI:  59 yo male with HNT, h/o HBV, chronic gout and HLP presented with c/o dark black stools x 5 days, fatigue and weakness. He went to PCP/NP 2 days ago and was told to take "medication" for stools and prednisone for gout flare. He was set up for outpatient c/scope 10/9.  He was concerned about his decreased appetite, diarrhea and weight loss. He denies abdominal pain, hematemesis, fever, chills, N/V.  He was taking indomethicin and ibuprofen intermittently for gout flares over past year. On admit h/h 7.5/21.3.  Coags normal. BUN/Cr 78/5.8.  Albumin 2.9.  GI consulted. Renal u/s pending. Transfusing one unit blood.     Hospital Course:  Pt admitted with ARF and anemia/diarrhea. Underwent c/scope 9/13. Transfuse one unit with inappropriate response in h/h. Plan to transfuse two units PRBCs today. Biopsy pending +/- steroids and mesalamine. Nephrology consulted, marginal improvement in renal function. Serum uric acid level elevated and urine level normal. No acute flare appreciated.     H/h improved; nephrology counseled on diet for gout; monitoring labs; gout flare to right wrist- started on prednisone   9/16 - pt stool is formed and lab cannot run a c.diff study, precautions discontinued. Cr trended up and likely needs biopsy. Gout flare improved     Interval History: pt denies bloody BMs, reports stools are formed     Review of Systems   Constitutional: Positive for activity change, appetite change and fatigue.   HENT: Negative.    Eyes: Negative.    Respiratory: Negative.    Cardiovascular: Negative.    Gastrointestinal: Positive for blood in stool and diarrhea. "   Endocrine: Negative.    Genitourinary: Negative.    Musculoskeletal: Positive for arthralgias.   Neurological: Negative.    Psychiatric/Behavioral: Negative.      Objective:     Vital Signs (Most Recent):  Temp: 97.7 °F (36.5 °C) (09/17/18 0706)  Pulse: 84 (09/17/18 0706)  Resp: 18 (09/17/18 0706)  BP: (!) 143/80 (09/17/18 0706)  SpO2: 100 % (09/17/18 0706) Vital Signs (24h Range):  Temp:  [97.3 °F (36.3 °C)-98.6 °F (37 °C)] 97.7 °F (36.5 °C)  Pulse:  [73-88] 84  Resp:  [17-18] 18  SpO2:  [95 %-100 %] 100 %  BP: (123-149)/(74-91) 143/80     Weight: 59.4 kg (131 lb)  Body mass index is 21.14 kg/m².    Intake/Output Summary (Last 24 hours) at 9/17/2018 0707  Last data filed at 9/16/2018 1720  Gross per 24 hour   Intake 720 ml   Output 500 ml   Net 220 ml      Physical Exam   Constitutional: He is oriented to person, place, and time. He appears well-developed and well-nourished.   HENT:   Head: Normocephalic and atraumatic.   Mouth/Throat: Oropharynx is clear and moist.   Eyes: EOM are normal. Pupils are equal, round, and reactive to light.   Neck: Normal range of motion. Neck supple. No JVD present.   Cardiovascular: Normal rate, regular rhythm, normal heart sounds and intact distal pulses.   Pulmonary/Chest: Effort normal and breath sounds normal. No respiratory distress.   Abdominal: Soft. Bowel sounds are normal. He exhibits no distension and no mass. There is no tenderness. There is no rebound and no guarding.   Musculoskeletal: Normal range of motion. He exhibits no edema.   Neurological: He is alert and oriented to person, place, and time.   Skin: Skin is warm and dry. Capillary refill takes less than 2 seconds. He is not diaphoretic.   Psychiatric: He has a normal mood and affect. His behavior is normal.       Significant Labs: All pertinent labs within the past 24 hours have been reviewed.    Significant Imaging: I have reviewed and interpreted all pertinent imaging results/findings within the past 24  hours.    Assessment/Plan:      * Gastrointestinal hemorrhage    GI consulted  PPI BID  Stool for occult blood  Clear liquid diet  Monitor CBC  IVF            Malnutrition of moderate degree    BOOST TID          Weight loss, unintentional      See above         Thrombocytopenia    Hold heparin/lovenox for DVT prophylaxis  ?h/o hepatitis B but treated 2010 - liver disease, chronic anemia, other chronic illness/malignancy?  Hepatitis and HIV - negative   Ivana/scd for DVT prophylaxis   Abdominal u/s pending             Acute blood loss anemia    Pt reports maroon and black stools  Taking anti-inflammatory meds for gout  Outpatient c/scope scheduled 10/9      GI consulted  Cbc in am  Transfuse one unit         Chronic gout of multiple sites    Uric acid level 13.4  Prednisone for acute flare  No NSAIDS or ibuprofen or indomethicin             Acute renal failure    10 months ago normal renal function  Differential includes dehydration, uropathy, NSAID use, ?h/o hbv   Renal ultrasound reviewed   IVF challenge  BMP in am  Avoid nephrotoxins  P/cr ratio, cardoso stain  Uric acid level   Nephrology consulted           Pure hypercholesterolemia    Resume statin         Essential (primary) hypertension    DC HCTZ as it can induce gout flares  Norvasc daily and titrate as needed             VTE Risk Mitigation (From admission, onward)        Ordered     IP VTE HIGH RISK PATIENT  Once      09/12/18 2341     Place IVANA hose  Until discontinued      09/12/18 2341     Place sequential compression device  Until discontinued      09/12/18 2341              Kiki Guzman MD  Department of Hospital Medicine   [facility]

## 2018-09-18 LAB
ANION GAP SERPL CALC-SCNC: 11 MMOL/L
BUN SERPL-MCNC: 107 MG/DL
CALCIUM SERPL-MCNC: 8 MG/DL
CHLORIDE SERPL-SCNC: 101 MMOL/L
CO2 SERPL-SCNC: 20 MMOL/L
CREAT SERPL-MCNC: 9.2 MG/DL
EST. GFR  (AFRICAN AMERICAN): 6 ML/MIN/1.73 M^2
EST. GFR  (NON AFRICAN AMERICAN): 6 ML/MIN/1.73 M^2
GLUCOSE SERPL-MCNC: 103 MG/DL
POTASSIUM SERPL-SCNC: 4.4 MMOL/L
SODIUM SERPL-SCNC: 132 MMOL/L

## 2018-09-18 PROCEDURE — 87517 HEPATITIS B DNA QUANT: CPT

## 2018-09-18 PROCEDURE — C9113 INJ PANTOPRAZOLE SODIUM, VIA: HCPCS | Performed by: HOSPITALIST

## 2018-09-18 PROCEDURE — 25000003 PHARM REV CODE 250: Performed by: INTERNAL MEDICINE

## 2018-09-18 PROCEDURE — 80048 BASIC METABOLIC PNL TOTAL CA: CPT

## 2018-09-18 PROCEDURE — 25000003 PHARM REV CODE 250: Performed by: HOSPITALIST

## 2018-09-18 PROCEDURE — 63600175 PHARM REV CODE 636 W HCPCS: Performed by: HOSPITALIST

## 2018-09-18 PROCEDURE — 36415 COLL VENOUS BLD VENIPUNCTURE: CPT

## 2018-09-18 PROCEDURE — 11000001 HC ACUTE MED/SURG PRIVATE ROOM

## 2018-09-18 RX ADMIN — AMLODIPINE BESYLATE 10 MG: 5 TABLET ORAL at 09:09

## 2018-09-18 RX ADMIN — ATORVASTATIN CALCIUM 20 MG: 10 TABLET, FILM COATED ORAL at 09:09

## 2018-09-18 RX ADMIN — CALCITRIOL 0.25 MCG: 0.25 CAPSULE, LIQUID FILLED ORAL at 09:09

## 2018-09-18 RX ADMIN — PANTOPRAZOLE SODIUM 40 MG: 40 INJECTION, POWDER, LYOPHILIZED, FOR SOLUTION INTRAVENOUS at 09:09

## 2018-09-18 RX ADMIN — PREDNISONE 40 MG: 20 TABLET ORAL at 09:09

## 2018-09-18 NOTE — PROGRESS NOTES
Chief Complaint / Reason for Consult: Hematochezia    No abd. Pain, no bloody BM's.      ROS: No CP, SOB, F/C    Patient Vitals for the past 24 hrs:   BP Temp Temp src Pulse Resp SpO2   09/18/18 1129 130/80 97.9 °F (36.6 °C) Oral 75 16 100 %   09/18/18 0803 (!) 143/75 97.8 °F (36.6 °C) Oral 71 18 100 %   09/18/18 0401 (!) 149/82 98.3 °F (36.8 °C) Oral 79 18 97 %   09/17/18 2258 135/80 98.1 °F (36.7 °C) Oral 73 18 100 %   09/17/18 1910 (!) 149/73 98 °F (36.7 °C) Oral 85 18 100 %   09/17/18 1507 139/79 98.2 °F (36.8 °C) -- 85 18 100 %       Physical Exam:  Gen - Well developed, well nourished, no apparent distress  HEENT - Anicteric  CV - S1, S2, no murmurs/rubs  Lungs - CTA-B, normal excursion  Abd - Soft, NT, ND, normal BS's, no HSM.  Ext - No c/c/e  Neuro - No asterixis    Labs:    Recent Labs   Lab  09/18/18   0408   CALCIUM  8.0*   NA  132*   K  4.4   CO2  20*   CL  101   BUN  107*   CREATININE  9.2*     + Hep B Core Ab    Imaging:  U/S:  Cholelithiasis.  Mild common duct dilatation with echogenic material seen within the common duct which may represent sludge versus retained stone.    Assessment:  This patient is a 58 y.o. male with:   1. Pan-Colitis, per C-scope - Awaiting pathology report, possibly consistent with UC.  On PO steroids.    2. Hx of Hep B - s/p treatment.  Surface Ag is negative, + Hep B Core Ab.  3. ARF - Renal following.  4. HNT, Hyperlipidemia, Eczema.....    Recommendations:  1. Monitor abd. Exam.  2. F/U with pathology.  3. Renal evaluation ongoing.  4. Outpatient f/u with Dr. Smith (09/26/18), consider biologic, though may need Hep B treatment.

## 2018-09-18 NOTE — NURSING
Assumed care of patient.  Bed in low position, wheels locked, alarms on and audible.  Potty, position, and pain needs addressed.  No acute distress noted;  Will continue to monitor.

## 2018-09-18 NOTE — PLAN OF CARE
09/18/18 1204   Discharge Reassessment   Assessment Type Discharge Planning Reassessment   Provided patient/caregiver education on the expected discharge date and the discharge plan No   Do you have any problems affording any of your prescribed medications? No   Discharge Plan A Home with family   Discharge Plan B Home with family   Patient choice form signed by patient/caregiver No   Can the patient answer the patient profile reliably? Yes, cognitively intact   How does the patient rate their overall health at the present time? Fair   How often would a person be available to care for the patient? Whenever needed   Number of comorbid conditions (as recorded on the chart) Two   During the past month, has the patient often been bothered by feeling down, depressed or hopeless? No   During the past month, has the patient often been bothered by little interest or pleasure in doing things? No     SW to pt's room to discuss discharge and help at home. Pt informed SW, he is independent, he works two jobs, but he needs help he has his wife and daughter at home.

## 2018-09-18 NOTE — PROGRESS NOTES
"[facility] [place] [place] Medicine  Progress Note    Patient Name: Duran Mccauley  MRN: [id number]  Patient Class: IP- Inpatient   Admission Date: 9/12/2018  Length of Stay: 5 days  Attending Physician: Kiki Guzman MD  Primary Care Provider: Patrick Cook MD        Subjective:     Principal Problem:Gastrointestinal hemorrhage    HPI:  57 yo male with HNT, h/o HBV, chronic gout and HLP presented with c/o dark black stools x 5 days, fatigue and weakness. He went to PCP/NP 2 days ago and was told to take "medication" for stools and prednisone for gout flare. He was set up for outpatient c/scope 10/9.  He was concerned about his decreased appetite, diarrhea and weight loss. He denies abdominal pain, hematemesis, fever, chills, N/V.  He was taking indomethicin and ibuprofen intermittently for gout flares over past year. On admit h/h 7.5/21.3.  Coags normal. BUN/Cr 78/5.8.  Albumin 2.9.  GI consulted. Renal u/s pending. Transfusing one unit blood.     Hospital Course:  Pt admitted with ARF and anemia/diarrhea. Underwent c/scope 9/13. Transfuse one unit with inappropriate response in h/h. Plan to transfuse two units PRBCs today. Biopsy pending +/- steroids and mesalamine. Nephrology consulted, marginal improvement in renal function. Serum uric acid level elevated and urine level normal. No acute flare appreciated.     H/h improved; nephrology counseled on diet for gout; monitoring labs; gout flare to right wrist- started on prednisone   9/16 - pt stool is formed and lab cannot run a c.diff study, precautions discontinued. Cr trended up and likely needs biopsy. Gout flare improved   9/17- renal function worsening, GI placed on prednisone, biopsy pending, ?renal biopsy     Interval History: no acute issues     Review of Systems   Constitutional: Positive for activity change, appetite change and fatigue.   HENT: Negative.    Eyes: Negative.    Respiratory: Negative.    Cardiovascular: Negative.  "   Gastrointestinal: Negative for blood in stool and diarrhea.   Endocrine: Negative.    Genitourinary: Negative.    Musculoskeletal: Positive for arthralgias.   Neurological: Negative.    Psychiatric/Behavioral: Negative.      Objective:     Vital Signs (Most Recent):  Temp: 98 °F (36.7 °C) (09/17/18 1910)  Pulse: 85 (09/17/18 1910)  Resp: 18 (09/17/18 1910)  BP: (!) 149/73 (09/17/18 1910)  SpO2: 100 % (09/17/18 1910) Vital Signs (24h Range):  Temp:  [97.3 °F (36.3 °C)-98.6 °F (37 °C)] 98 °F (36.7 °C)  Pulse:  [81-95] 85  Resp:  [18] 18  SpO2:  [95 %-100 %] 100 %  BP: (139-149)/(73-91) 149/73     Weight: 59.4 kg (131 lb)  Body mass index is 21.14 kg/m².  No intake or output data in the 24 hours ending 09/17/18 2135   Physical Exam   Constitutional: He is oriented to person, place, and time. He appears well-developed and well-nourished.   HENT:   Head: Normocephalic and atraumatic.   Mouth/Throat: Oropharynx is clear and moist.   Eyes: EOM are normal. Pupils are equal, round, and reactive to light.   Neck: Normal range of motion. Neck supple. No JVD present.   Cardiovascular: Normal rate, regular rhythm, normal heart sounds and intact distal pulses.   Pulmonary/Chest: Effort normal and breath sounds normal. No respiratory distress.   Abdominal: Soft. Bowel sounds are normal. He exhibits no distension and no mass. There is no tenderness. There is no rebound and no guarding.   Musculoskeletal: Normal range of motion. He exhibits no edema.   Neurological: He is alert and oriented to person, place, and time.   Skin: Skin is warm and dry. Capillary refill takes less than 2 seconds. He is not diaphoretic.   Psychiatric: He has a normal mood and affect. His behavior is normal.       Significant Labs:   BMP:   Recent Labs   Lab  09/17/18   1214   GLU  118*   NA  132*   K  4.3   CL  101   CO2  21*   BUN  90*   CREATININE  8.6*   CALCIUM  8.6*       Significant Imaging: I have reviewed all pertinent imaging results/findings  within the past 24 hours.    Assessment/Plan:      * Gastrointestinal hemorrhage    GI consulted  PPI BID  Stool for occult blood  Clear liquid diet  Monitor CBC  IVF            Malnutrition of moderate degree    BOOST TID          Weight loss, unintentional      See above         Thrombocytopenia    Hold heparin/lovenox for DVT prophylaxis  ?h/o hepatitis B but treated 2010 - liver disease, chronic anemia, other chronic illness/malignancy?  Hepatitis and HIV - negative   Ivana/scd for DVT prophylaxis   Abdominal u/s pending             Acute blood loss anemia    Pt reports maroon and black stools  Taking anti-inflammatory meds for gout  Outpatient c/scope scheduled 10/9      GI consulted  Cbc in am  Transfused total 3 units         Chronic gout of multiple sites    Uric acid level 13.4  Prednisone for acute flare  No NSAIDS or ibuprofen or indomethicin             Acute renal failure    10 months ago normal renal function  Differential includes dehydration, uropathy, NSAID use, ?h/o hbv   Renal ultrasound reviewed   IVF challenge  BMP in am  Avoid nephrotoxins  P/cr ratio, cardoso stain  Uric acid level   Nephrology consulted           Pure hypercholesterolemia    Resume statin         Essential (primary) hypertension    DC HCTZ as it can induce gout flares  Norvasc daily and titrate as needed             VTE Risk Mitigation (From admission, onward)        Ordered     IP VTE HIGH RISK PATIENT  Once      09/12/18 2341     Place IVANA hose  Until discontinued      09/12/18 2341     Place sequential compression device  Until discontinued      09/12/18 2341              Kiki Guzman MD  Department of Hospital Medicine   [facility]

## 2018-09-18 NOTE — PROGRESS NOTES
"[facility] [place]  Progress Note    Patient Name: Duran Mccauley  MRN: [id number]  Patient Class: IP- Inpatient   Admission Date: 9/12/2018  Length of Stay: 6 days  Attending Physician: Celestine Arora MD  Primary Care Provider: Patrick Cook MD        Subjective:     Principal Problem:Gastrointestinal hemorrhage    HPI:  59 yo male with HNT, h/o HBV, chronic gout and HLP presented with c/o dark black stools x 5 days, fatigue and weakness. He went to PCP/NP 2 days ago and was told to take "medication" for stools and prednisone for gout flare. He was set up for outpatient c/scope 10/9.  He was concerned about his decreased appetite, diarrhea and weight loss. He denies abdominal pain, hematemesis, fever, chills, N/V.  He was taking indomethicin and ibuprofen intermittently for gout flares over past year. On admit h/h 7.5/21.3.  Coags normal. BUN/Cr 78/5.8.  Albumin 2.9.  GI consulted. Renal u/s pending. Transfusing one unit blood.     Hospital Course:  Pt admitted with ARF and anemia/diarrhea. Underwent c/scope 9/13. Transfuse one unit with inappropriate response in h/h. Plan to transfuse two units PRBCs today. Biopsy pending +/- steroids and mesalamine. Nephrology consulted, marginal improvement in renal function. Serum uric acid level elevated and urine level normal. No acute flare appreciated.   H/h improved; nephrology counseled on diet for gout; monitoring labs; gout flare to right wrist- started on prednisone   9/16 - pt stool is formed and lab cannot run a c.diff study, precautions discontinued. Cr trended up and likely needs biopsy. Gout flare improved   9/17- renal function worsening, GI placed on prednisone  Worsening renal function.    Interval History: No complaints.    Review of Systems   HENT: Negative for ear discharge and ear pain.    Eyes: Negative for pain and itching.   Endocrine: Negative for polyphagia and polyuria.   Neurological: Negative for seizures and " syncope.     Objective:     Vital Signs (Most Recent):  Temp: 98.3 °F (36.8 °C) (09/18/18 1739)  Pulse: 81 (09/18/18 1739)  Resp: 17 (09/18/18 1739)  BP: 138/78 (09/18/18 1739)  SpO2: 100 % (09/18/18 1739) Vital Signs (24h Range):  Temp:  [97.8 °F (36.6 °C)-98.3 °F (36.8 °C)] 98.3 °F (36.8 °C)  Pulse:  [71-85] 81  Resp:  [16-18] 17  SpO2:  [97 %-100 %] 100 %  BP: (130-149)/(73-82) 138/78     Weight: 59.4 kg (131 lb)  Body mass index is 21.14 kg/m².    Intake/Output Summary (Last 24 hours) at 9/18/2018 1750  Last data filed at 9/18/2018 1200  Gross per 24 hour   Intake 118 ml   Output 650 ml   Net -532 ml      Physical Exam   Constitutional: He is oriented to person, place, and time. He appears well-developed and well-nourished.   HENT:   Head: Normocephalic and atraumatic.   Mouth/Throat: Oropharynx is clear and moist.   Eyes: EOM are normal. Pupils are equal, round, and reactive to light.   Neck: Normal range of motion. Neck supple. No JVD present.   Cardiovascular: Normal rate, regular rhythm, normal heart sounds and intact distal pulses.   Pulmonary/Chest: Effort normal and breath sounds normal. No respiratory distress.   Abdominal: Soft. Bowel sounds are normal. He exhibits no distension and no mass. There is no tenderness. There is no rebound and no guarding.   Musculoskeletal: Normal range of motion. He exhibits no edema.   Neurological: He is alert and oriented to person, place, and time.   Skin: Skin is warm and dry. Capillary refill takes less than 2 seconds. He is not diaphoretic.   Psychiatric: He has a normal mood and affect. His behavior is normal.       Significant Labs:   BMP:   Recent Labs   Lab  09/18/18   0408   GLU  103   NA  132*   K  4.4   CL  101   CO2  20*   BUN  107*   CREATININE  9.2*   CALCIUM  8.0*     CBC:   Recent Labs   Lab  09/17/18   1214   WBC  11.40   HGB  10.9*   HCT  31.3*   PLT  76*         Assessment/Plan:      * Gastrointestinal hemorrhage    Appreciate GI input.  Boykin colitis  per C scope.  Possible UC and started on steroids.  Awaiting pathology.  H/H stable post transfusion.            Acute renal failure    10 months ago normal renal function  Possibly secondary to NSAID use.  Worsening Creat.  Patient states he is making good amount of urine, but it's not being calculated.  He started a log.  Nephrology following.          Malnutrition of moderate degree    BOOST TID          Weight loss, unintentional      See above         Thrombocytopenia    Hold heparin/lovenox for DVT prophylaxis  h/o hepatitis B but treated 2010 - liver disease, chronic anemia  HIV - negative   Ivana/scd for DVT prophylaxis               Acute blood loss anemia    Adequate correction of H/H with transfusion.  H/H now stable.        Chronic gout of multiple sites    Uric acid level 13.4  Prednisone for acute flare  No NSAIDS or ibuprofen or indomethicin             Pure hypercholesterolemia    Resume statin         Essential (primary) hypertension    Continue current management.            VTE Risk Mitigation (From admission, onward)        Ordered     IP VTE HIGH RISK PATIENT  Once      09/12/18 2341     Place IVANA hose  Until discontinued      09/12/18 2341     Place sequential compression device  Until discontinued      09/12/18 2341              Celestine Arora MD  Department of Hospital Medicine   [facility]

## 2018-09-18 NOTE — ASSESSMENT & PLAN NOTE
Hold heparin/lovenox for DVT prophylaxis  h/o hepatitis B but treated 2010 - liver disease, chronic anemia  HIV - negative   Og/scd for DVT prophylaxis

## 2018-09-18 NOTE — PLAN OF CARE
Problem: Patient Care Overview  Goal: Plan of Care Review  Outcome: Ongoing (interventions implemented as appropriate)   09/18/18 2147   Coping/Psychosocial   Plan Of Care Reviewed With patient   AAOX4. Ambulates to bathroom. Remains free from falls. Denies pain. Safety maintained: bed in lowest position c wheels locked, personal items and call light within reach, daughter at bedside. No distress noted. Will continue c POC.

## 2018-09-18 NOTE — SUBJECTIVE & OBJECTIVE
Interval History: no acute issues     Review of Systems   Constitutional: Positive for activity change, appetite change and fatigue.   HENT: Negative.    Eyes: Negative.    Respiratory: Negative.    Cardiovascular: Negative.    Gastrointestinal: Negative for blood in stool and diarrhea.   Endocrine: Negative.    Genitourinary: Negative.    Musculoskeletal: Positive for arthralgias.   Neurological: Negative.    Psychiatric/Behavioral: Negative.      Objective:     Vital Signs (Most Recent):  Temp: 98 °F (36.7 °C) (09/17/18 1910)  Pulse: 85 (09/17/18 1910)  Resp: 18 (09/17/18 1910)  BP: (!) 149/73 (09/17/18 1910)  SpO2: 100 % (09/17/18 1910) Vital Signs (24h Range):  Temp:  [97.3 °F (36.3 °C)-98.6 °F (37 °C)] 98 °F (36.7 °C)  Pulse:  [81-95] 85  Resp:  [18] 18  SpO2:  [95 %-100 %] 100 %  BP: (139-149)/(73-91) 149/73     Weight: 59.4 kg (131 lb)  Body mass index is 21.14 kg/m².  No intake or output data in the 24 hours ending 09/17/18 2135   Physical Exam   Constitutional: He is oriented to person, place, and time. He appears well-developed and well-nourished.   HENT:   Head: Normocephalic and atraumatic.   Mouth/Throat: Oropharynx is clear and moist.   Eyes: EOM are normal. Pupils are equal, round, and reactive to light.   Neck: Normal range of motion. Neck supple. No JVD present.   Cardiovascular: Normal rate, regular rhythm, normal heart sounds and intact distal pulses.   Pulmonary/Chest: Effort normal and breath sounds normal. No respiratory distress.   Abdominal: Soft. Bowel sounds are normal. He exhibits no distension and no mass. There is no tenderness. There is no rebound and no guarding.   Musculoskeletal: Normal range of motion. He exhibits no edema.   Neurological: He is alert and oriented to person, place, and time.   Skin: Skin is warm and dry. Capillary refill takes less than 2 seconds. He is not diaphoretic.   Psychiatric: He has a normal mood and affect. His behavior is normal.       Significant Labs:    BMP:   Recent Labs   Lab  09/17/18   1214   GLU  118*   NA  132*   K  4.3   CL  101   CO2  21*   BUN  90*   CREATININE  8.6*   CALCIUM  8.6*       Significant Imaging: I have reviewed all pertinent imaging results/findings within the past 24 hours.

## 2018-09-18 NOTE — SUBJECTIVE & OBJECTIVE
Interval History: No complaints.    Review of Systems   HENT: Negative for ear discharge and ear pain.    Eyes: Negative for pain and itching.   Endocrine: Negative for polyphagia and polyuria.   Neurological: Negative for seizures and syncope.     Objective:     Vital Signs (Most Recent):  Temp: 98.3 °F (36.8 °C) (09/18/18 1739)  Pulse: 81 (09/18/18 1739)  Resp: 17 (09/18/18 1739)  BP: 138/78 (09/18/18 1739)  SpO2: 100 % (09/18/18 1739) Vital Signs (24h Range):  Temp:  [97.8 °F (36.6 °C)-98.3 °F (36.8 °C)] 98.3 °F (36.8 °C)  Pulse:  [71-85] 81  Resp:  [16-18] 17  SpO2:  [97 %-100 %] 100 %  BP: (130-149)/(73-82) 138/78     Weight: 59.4 kg (131 lb)  Body mass index is 21.14 kg/m².    Intake/Output Summary (Last 24 hours) at 9/18/2018 1750  Last data filed at 9/18/2018 1200  Gross per 24 hour   Intake 118 ml   Output 650 ml   Net -532 ml      Physical Exam   Constitutional: He is oriented to person, place, and time. He appears well-developed and well-nourished.   HENT:   Head: Normocephalic and atraumatic.   Mouth/Throat: Oropharynx is clear and moist.   Eyes: EOM are normal. Pupils are equal, round, and reactive to light.   Neck: Normal range of motion. Neck supple. No JVD present.   Cardiovascular: Normal rate, regular rhythm, normal heart sounds and intact distal pulses.   Pulmonary/Chest: Effort normal and breath sounds normal. No respiratory distress.   Abdominal: Soft. Bowel sounds are normal. He exhibits no distension and no mass. There is no tenderness. There is no rebound and no guarding.   Musculoskeletal: Normal range of motion. He exhibits no edema.   Neurological: He is alert and oriented to person, place, and time.   Skin: Skin is warm and dry. Capillary refill takes less than 2 seconds. He is not diaphoretic.   Psychiatric: He has a normal mood and affect. His behavior is normal.       Significant Labs:   BMP:   Recent Labs   Lab  09/18/18   0408   GLU  103   NA  132*   K  4.4   CL  101   CO2  20*   BUN   107*   CREATININE  9.2*   CALCIUM  8.0*     CBC:   Recent Labs   Lab  09/17/18   1214   WBC  11.40   HGB  10.9*   HCT  31.3*   PLT  76*

## 2018-09-18 NOTE — ASSESSMENT & PLAN NOTE
10 months ago normal renal function  Possibly secondary to NSAID use.  Worsening Creat.  Patient states he is making good amount of urine, but it's not being calculated.  He started a log.  Nephrology following.

## 2018-09-18 NOTE — PROGRESS NOTES
Awake alert oriented NAD    Denies CNS ENT CP GI  RHEUM OR DERM SX  Past Medical History:   Diagnosis Date    Eczema     Hepatitis B     treated 2010s and negative RNA load afterward    Hyperlipidemia     Hypertension      Review of patient's allergies indicates:  No Known Allergies    Current Facility-Administered Medications   Medication    0.9%  NaCl infusion (for blood administration)    0.9%  NaCl infusion (for blood administration)    amLODIPine tablet 10 mg    atorvastatin tablet 20 mg    calcitRIOL capsule 0.25 mcg    pantoprazole injection 40 mg    predniSONE tablet 40 mg       LABS    Recent Results (from the past 24 hour(s))   Basic metabolic panel    Collection Time: 09/18/18  4:08 AM   Result Value Ref Range    Sodium 132 (L) 136 - 145 mmol/L    Potassium 4.4 3.5 - 5.1 mmol/L    Chloride 101 95 - 110 mmol/L    CO2 20 (L) 23 - 29 mmol/L    Glucose 103 70 - 110 mg/dL    BUN, Bld 107 (H) 6 - 20 mg/dL    Creatinine 9.2 (H) 0.5 - 1.4 mg/dL    Calcium 8.0 (L) 8.7 - 10.5 mg/dL    Anion Gap 11 8 - 16 mmol/L    eGFR if African American 6 (A) >60 mL/min/1.73 m^2    eGFR if non African American 6 (A) >60 mL/min/1.73 m^2   ]    I/O last 3 completed shifts:  In: -   Out: 200 [Urine:200]    Vitals:    09/17/18 2258 09/18/18 0401 09/18/18 0803 09/18/18 1129   BP: 135/80 (!) 149/82 (!) 143/75 130/80   Pulse: 73 79 71 75   Resp: 18 18 18 16   Temp: 98.1 °F (36.7 °C) 98.3 °F (36.8 °C) 97.8 °F (36.6 °C) 97.9 °F (36.6 °C)   TempSrc: Oral Oral Oral Oral   SpO2: 100% 97% 100% 100%   Weight:       Height:           No Jvd, Thyromegaly or Lymphadenopathy  Lungs: Fairly clear anteriorly and laterally  Cor: RRR no G or rubs  Abd: Soft benign good bowel sounds non tender  Ext: No E C C    A)  Tells me that he is still voiding quite a bit  Creat worst but asx  Tells me that about 4-5 y ago he had [facility] at  [facility]. Pt unclear of results.  DIANE  Met acidosis  Anemia  Gout  Hx of hep b  Smallish  kidneys on sono  Severe proteinuria >5 grams  Protein malnutrition  Thrombocytopenia  High pth     P)    Strict I/O's  [facility] specifically K bx  Close for the need of HD

## 2018-09-18 NOTE — ASSESSMENT & PLAN NOTE
Appreciate GI input.  Pan colitis per C scope.  Possible UC and started on steroids.  Awaiting pathology.  H/H stable post transfusion.

## 2018-09-18 NOTE — ASSESSMENT & PLAN NOTE
Pt reports maroon and black stools  Taking anti-inflammatory meds for gout  Outpatient c/scope scheduled 10/9      GI consulted  Cbc in am  Transfused total 3 units

## 2018-09-19 LAB
ANION GAP SERPL CALC-SCNC: 13 MMOL/L
BUN SERPL-MCNC: 117 MG/DL
CALCIUM SERPL-MCNC: 7.6 MG/DL
CHLORIDE SERPL-SCNC: 98 MMOL/L
CO2 SERPL-SCNC: 16 MMOL/L
CREAT SERPL-MCNC: 9.8 MG/DL
EST. GFR  (AFRICAN AMERICAN): 6 ML/MIN/1.73 M^2
EST. GFR  (NON AFRICAN AMERICAN): 5 ML/MIN/1.73 M^2
GLUCOSE SERPL-MCNC: 115 MG/DL
POTASSIUM SERPL-SCNC: 4.4 MMOL/L
SODIUM SERPL-SCNC: 127 MMOL/L

## 2018-09-19 PROCEDURE — 36415 COLL VENOUS BLD VENIPUNCTURE: CPT

## 2018-09-19 PROCEDURE — 25000003 PHARM REV CODE 250: Performed by: INTERNAL MEDICINE

## 2018-09-19 PROCEDURE — 02HV33Z INSERTION OF INFUSION DEVICE INTO SUPERIOR VENA CAVA, PERCUTANEOUS APPROACH: ICD-10-PCS | Performed by: RADIOLOGY

## 2018-09-19 PROCEDURE — 25000003 PHARM REV CODE 250: Performed by: HOSPITALIST

## 2018-09-19 PROCEDURE — 80048 BASIC METABOLIC PNL TOTAL CA: CPT

## 2018-09-19 PROCEDURE — 11000001 HC ACUTE MED/SURG PRIVATE ROOM

## 2018-09-19 PROCEDURE — 63600175 PHARM REV CODE 636 W HCPCS: Performed by: HOSPITALIST

## 2018-09-19 PROCEDURE — 85025 COMPLETE CBC W/AUTO DIFF WBC: CPT

## 2018-09-19 RX ADMIN — CALCITRIOL 0.25 MCG: 0.25 CAPSULE, LIQUID FILLED ORAL at 10:09

## 2018-09-19 RX ADMIN — ATORVASTATIN CALCIUM 20 MG: 10 TABLET, FILM COATED ORAL at 10:09

## 2018-09-19 RX ADMIN — PREDNISONE 40 MG: 20 TABLET ORAL at 10:09

## 2018-09-19 RX ADMIN — AMLODIPINE BESYLATE 10 MG: 5 TABLET ORAL at 10:09

## 2018-09-19 NOTE — NURSING
Pt denies pain, free of falls. Refused SCDS/IVANA hose. Ambulates in bello. Had tunneled catheter placement R. IJ, site clean, dry and intact. Release of info form faxed to [facility] to get medical records from passed kidney biopsy. Fax number [phone]. Bed in low position,call light within reach.

## 2018-09-19 NOTE — CONSULTS
Please see H&P and Procedure note from same date.    Stuart Maynard MD  Diagnostic and Interventional Radiologist  Department of Radiology  Pager: [phone]

## 2018-09-19 NOTE — PLAN OF CARE
Problem: Patient Care Overview  Goal: Plan of Care Review  Outcome: Ongoing (interventions implemented as appropriate)   09/19/18 6218   Coping/Psychosocial   Plan Of Care Reviewed With patient   Pt AOx4. No c/o pain. Pt monitoring oral intake. Remains free of falls. No s/s of distress or discomfort. Safety maintained. Will cont to monitor...

## 2018-09-19 NOTE — PROCEDURES
Radiology Post-Procedure Note    Pre Op Diagnosis: DIANE on CKD, need for HD  Post Op Diagnosis: Same    Procedure: Tunneled HD catheter placement    Procedure performed by: Stuart Maynard MD    Written Informed Consent Obtained: Yes  Specimen Removed: NO  Estimated Blood Loss: Minimal    Findings:   Successful right chest tunneled HD catheter placement via right IJ access.    Patient tolerated procedure well.    Stuart Maynard MD  Diagnostic and Interventional Radiologist  Department of Radiology  Pager: [phone]

## 2018-09-19 NOTE — PHYSICIAN QUERY
"PT Name: Duran Mccauley                                                   See Hosp Med Pn 9/19.  [facility]  MR #: [id number]    Physician Query Form - Pathology Findings Clarification     Eunice Prince RN, CCDS  Contact Info: [phone]  [email or web address]    This form is a permanent document in the medical record.     Query Date: September 19, 2018      By submitting this query, we are merely seeking further clarification of documentation.  Please utilize your independent clinical judgment when addressing the question(s) below.      The medical record contains the following:     Findings Supporting Clinical Information Location in Medical Record    Post Operative Diagnosis  R/O Ulcerative colitiis  SPECIMEN  1) Terminal Ileum Bx  2) Random Colon Bx    FINAL PATHOLOGIC DIAGNOSIS  1. Ileum, terminal, biopsy:  - Small bowel mucosa with no significant histologic abnormality.    2. Colon, random, biopsy:  - Severe active chronic colitis.  - Immunostain for cytomegalovirus pending, results will be issued in an addendum.  - See comment. Path Results 9/14     Please document the clinical significance of the Pathologists findings of "Severe Active Chronic colitis".          [  ] I agree with the Pathology Findings        [  ] I do not agree with the Pathology Findings        [  ] Clinically Insignificant        [  ] Clinically Undetermined        [  ] Other/Clarification of Findings: ______________________________________________    Please document in your progress notes daily for the duration of treatment until resolved and include in your discharge summary.                   "

## 2018-09-19 NOTE — PROGRESS NOTES
Aleksandra Anne is calling with symptoms of sinus.    Duration of symptoms:  3 Weeks  Sinus pressure/headache: Yes: both constant, took imitrex and that did not help  Sinus congestion: No more runny  Fever: Yes: 100 taking orally  Other symptoms present: cough with chest congestion with right ear ache  History of allergies: yes  Used the following over the counter medications: mucinex and cold and cough HCP  Appointment offered: yes but patient was seen 2x  Preferred Pharmacy:   [id number] [address] [address]  Phone: [phone] Fax: [phone]           The patient states he has been urinating a lot.  He has not had any bleeding.    Vitals:    09/18/18 2303 09/19/18 0435 09/19/18 0727 09/19/18 1126   BP: (!) 161/75 123/79 130/81 133/71   BP Location: Right leg Right arm Right arm Right arm   Patient Position: Lying Lying Lying Sitting   Pulse: 80 76 76 81   Resp: 18 18 16 16   Temp: 98.3 °F (36.8 °C) 98.1 °F (36.7 °C) 98 °F (36.7 °C) 97 °F (36.1 °C)   TempSrc: Oral Oral Oral Oral   SpO2: 100% 99% 99% 99%   Weight:       Height:          amLODIPine  10 mg Oral Daily    atorvastatin  20 mg Oral Daily    calcitRIOL  0.25 mcg Oral Daily    predniSONE  40 mg Oral Daily     P.E.:  GEN: A x O x 3, NAD  HEENT: EOMI, PERRL, anicteric sclera  CV: RRR, no M/R/G  Chest: CTA B  Abdomen: soft, NTND, normoactive BS  Ext: No C/C/E. 2+ dorsalis pedis pulses B    Labs:  Recent Results (from the past 336 hour(s))   CBC auto differential    Collection Time: 09/17/18 12:14 PM   Result Value Ref Range    WBC 11.40 3.90 - 12.70 K/uL    Hemoglobin 10.9 (L) 14.0 - 18.0 g/dL    Hematocrit 31.3 (L) 40.0 - 54.0 %    Platelets 76 (L) 150 - 350 K/uL   CBC auto differential    Collection Time: 09/16/18 11:08 AM   Result Value Ref Range    WBC 7.89 3.90 - 12.70 K/uL    Hemoglobin 9.9 (L) 14.0 - 18.0 g/dL    Hematocrit 29.2 (L) 40.0 - 54.0 %    Platelets 62 (L) 150 - 350 K/uL   CBC auto differential    Collection Time: 09/15/18  5:59 AM   Result Value Ref Range    WBC 8.35 3.90 - 12.70 K/uL    Hemoglobin 9.9 (L) 14.0 - 18.0 g/dL    Hematocrit 28.6 (L) 40.0 - 54.0 %    Platelets 78 (L) 150 - 350 K/uL     CMP  Sodium   Date Value Ref Range Status   09/19/2018 127 (L) 136 - 145 mmol/L Final     Potassium   Date Value Ref Range Status   09/19/2018 4.4 3.5 - 5.1 mmol/L Final     Chloride   Date Value Ref Range Status   09/19/2018 98 95 - 110 mmol/L Final     CO2   Date Value Ref Range Status   09/19/2018 16 (L) 23 - 29 mmol/L Final     Glucose   Date Value Ref Range Status   09/19/2018 115 (H) 70 -  110 mg/dL Final     BUN, Bld   Date Value Ref Range Status   09/19/2018 117 (H) 6 - 20 mg/dL Final     Creatinine   Date Value Ref Range Status   09/19/2018 9.8 (H) 0.5 - 1.4 mg/dL Final     Calcium   Date Value Ref Range Status   09/19/2018 7.6 (L) 8.7 - 10.5 mg/dL Final     Total Protein   Date Value Ref Range Status   09/14/2018 5.7 (L) 6.0 - 8.4 g/dL Final     Albumin   Date Value Ref Range Status   09/14/2018 2.5 (L) 3.5 - 5.2 g/dL Final     Total Bilirubin   Date Value Ref Range Status   09/14/2018 1.3 (H) 0.1 - 1.0 mg/dL Final     Comment:     For infants and newborns, interpretation of results should be based  on gestational age, weight and in agreement with clinical  observations.  Premature Infant recommended reference ranges:  Up to 24 hours.............<8.0 mg/dL  Up to 48 hours............<12.0 mg/dL  3-5 days..................<15.0 mg/dL  6-29 days.................<15.0 mg/dL       Alkaline Phosphatase   Date Value Ref Range Status   09/14/2018 68 55 - 135 U/L Final     AST   Date Value Ref Range Status   09/14/2018 30 10 - 40 U/L Final     ALT   Date Value Ref Range Status   09/14/2018 11 10 - 44 U/L Final     Anion Gap   Date Value Ref Range Status   09/19/2018 13 8 - 16 mmol/L Final     eGFR if    Date Value Ref Range Status   09/19/2018 6 (A) >60 mL/min/1.73 m^2 Final     eGFR if non    Date Value Ref Range Status   09/19/2018 5 (A) >60 mL/min/1.73 m^2 Final     Comment:     Calculation used to obtain the estimated glomerular filtration  rate (eGFR) is the CKD-EPI equation.          No results for input(s): PT, INR, APTT in the last 24 hours.    9/14/18:  Ultrasound:  Cholelithiasis.  Mild common duct dilatation with echogenic material seen within the common duct which may represent sludge versus retained stone.    A/P:  The patient is a 58 year old man with a history of HTN, hepatitis B treated, and eczema presenting with hematochezia and acute renal  insufficiency.  1.  Hematochezia - a colonoscopy showed pancolitis, suggestive of ulcerative colitis.  The terminal ileum was normal.  Pathology returned as nonspecific but mentioned inflammatory bowel disease was mentioned as a possibility.  Clostridium difficile toxin was unable to be obtained as his stools were too formed.  A steroid taper was started.  An IBD-7 panel will be checked to hopefully also support the diagnosis of ulcerative colitis as the pathology was not clear.  A hepatitis B DNA will be checked in the event he needs to be started on a biologic agent as his hepatitis B core antibody was positive (this could be from past treatment).  He would also benefit from an outpatient EUS as an ultrasound suggested cholelithiasis with a mildly dilated common bile duct with possibly sludge or a stone in the common bile duct.  Although his total bilirubin is stable at 1.3, he denies having any symptoms or right upper quadrant pain.  The patient has a GI Clinic visit with Dr. Smith on 9/26/18.  Gastroenterology has nothing additional to add.  Please call GI with any further issues.

## 2018-09-19 NOTE — H&P
Inpatient Radiology Pre-procedure Note    History of Present Illness:  Duran Mccauley is a 58 y.o. male with DIANE on CKD, needing HD, who presents for tunneled HD catheter placement.  Admission H&P reviewed.  Past Medical History:   Diagnosis Date    Eczema     Hepatitis B     treated 2010s and negative RNA load afterward    Hyperlipidemia     Hypertension      Past Surgical History:   Procedure Laterality Date    COLONOSCOPY N/A 9/14/2018    Procedure: COLONOSCOPY;  Surgeon: Viri Blair MD;  Location: [facility] ENDO;  Service: Endoscopy;  Laterality: N/A;    COLONOSCOPY N/A 9/14/2018    Performed by Viri Blair MD at [facility] ENDO    NO PAST SURGERIES         Review of Systems:   As documented in primary team H&P    Home Meds:   Prior to Admission medications    Medication Sig Start Date End Date Taking? Authorizing Provider   atorvastatin (LIPITOR) 20 MG tablet Take 1 tablet (20 mg total) by mouth once daily. 11/8/17  Yes Patrick Cook MD   atorvastatin (LIPITOR) 20 MG tablet TAKE 1 TABLET(20 MG) BY MOUTH EVERY DAY 12/19/17  Yes Patrick Cook MD   hydroCHLOROthiazide (HYDRODIURIL) 12.5 MG Tab Take 1 tablet (12.5 mg total) by mouth once daily. 11/8/17  Yes Patrick Cook MD   predniSONE (DELTASONE) 10 MG tablet 4 by mouth once today, then 3 by mouth tomorrow, then 2 by mouth on day 3, then one by mouth on the last day. 9/11/18  Yes Fernando Perdomo NP   ibuprofen (ADVIL,MOTRIN) 800 MG tablet Take 800 mg by mouth once daily. 4/25/18   Historical Provider, MD   indomethacin (INDOCIN) 50 MG capsule Take 1 capsule (50 mg total) by mouth 3 (three) times daily as needed (joint pain/gout flares). 7/2/18   Patrick Cook MD   lidocaine (LIDODERM) 5 % Place 1 patch onto the skin once daily. Remove & Discard patch within 12 hours or as directed by MD 1/15/17   Cesar Villa MD   methylPREDNISolone (MEDROL DOSEPACK) 4 mg tablet use as directed 4/27/18   Fernando Perdomo NP   oxycodone-acetaminophen  (PERCOCET) 5-325 mg per tablet Take 1 tablet by mouth every 4 (four) hours as needed for Pain. 1/15/17   Cesar Villa MD   polyethylene glycol (COLYTE) 240-22.72-6.72 -5.84 gram SolR Take 4,000 mLs (4 L total) by mouth as needed. 9/13/18   Viri Blair MD     Scheduled Meds:    amLODIPine  10 mg Oral Daily    atorvastatin  20 mg Oral Daily    calcitRIOL  0.25 mcg Oral Daily    predniSONE  40 mg Oral Daily     Continuous Infusions:   PRN Meds:sodium chloride, sodium chloride  Anticoagulants/Antiplatelets: no anticoagulation    Allergies: Review of patient's allergies indicates:  No Known Allergies  Sedation Hx: have not been any systemic reactions    Labs:  Recent Labs   Lab  09/12/18   1723   INR  1.0       Recent Labs   Lab  09/17/18   1214   WBC  11.40   HGB  10.9*   HCT  31.3*   MCV  87   PLT  76*      Recent Labs   Lab  09/14/18   0409   09/19/18   0402   GLU  89   < >  115*   NA  142   < >  127*   K  4.3   < >  4.4   CL  111*   < >  98   CO2  20*   < >  16*   BUN  72*   < >  117*   CREATININE  5.1*   < >  9.8*   CALCIUM  8.1*   < >  7.6*   ALT  11   --    --    AST  30   --    --    ALBUMIN  2.5*   --    --    BILITOT  1.3*   --    --     < > = values in this interval not displayed.         Vitals:  Temp: 97 °F (36.1 °C) (09/19/18 1126)  Pulse: 81 (09/19/18 1126)  Resp: 16 (09/19/18 1126)  BP: 133/71 (09/19/18 1126)  SpO2: 99 % (09/19/18 1126)     Physical Exam:  ASA: 3  Mallampati: 2    General: no acute distress  Mental Status: alert and oriented to person, place and time  HEENT: normocephalic, atraumatic  Chest: unlabored breathing  Heart: regular heart rate  Abdomen: nondistended  Extremity: moves all extremities    Plan: Proceed with tunneled HD catheter placement.  Sedation Plan: Moderate.    Stuart Maynard MD  Diagnostic and Interventional Radiologist  Department of Radiology  Pager: [phone]

## 2018-09-19 NOTE — ASSESSMENT & PLAN NOTE
Appreciate GI input.  Pan colitis per C scope.  Possible UC and started on steroids.  Pathology returned as nonspecific but mentioned inflammatory bowel disease was mentioned as a possibility  H/H stable post transfusion.

## 2018-09-19 NOTE — NURSING
"After faxing form, An email was received saying  "fax to [phone] abandoned after 8 attempts"  Will tell oncoming nurse about it and maybe they can try sending it again in the am. Also called [facility] and  provided me with the MRs  number [phone] ,  told me to call in the am.   "

## 2018-09-19 NOTE — PLAN OF CARE
Problem: Patient Care Overview  Goal: Plan of Care Review  Outcome: Ongoing (interventions implemented as appropriate)  Pt remains free of falls. Denies pain. Understands that he will benefit from HD. Keeps record of Intake/output.

## 2018-09-19 NOTE — ASSESSMENT & PLAN NOTE
10 months ago normal renal function  Possibly secondary to NSAID use.  Worsening Creat.  Patient states he is making good amount of urine, but it's not being calculated.  He started a log.  Nephrology following.  Creat continues to worsen and IR consulted for HD catheter placement.

## 2018-09-19 NOTE — PROGRESS NOTES
Creat worst situation discussed with pt    At this point we only have 2 options: 1) do nothing and allow pt to pass or 2 ) HD    He opted for HD    Awake alert oriented NAD    Denies CNS ENT CP GI  RHEUM OR DERM SX  Past Medical History:   Diagnosis Date    Eczema     Hepatitis B     treated 2010s and negative RNA load afterward    Hyperlipidemia     Hypertension      Review of patient's allergies indicates:  No Known Allergies    Current Facility-Administered Medications   Medication    0.9%  NaCl infusion (for blood administration)    0.9%  NaCl infusion (for blood administration)    amLODIPine tablet 10 mg    atorvastatin tablet 20 mg    calcitRIOL capsule 0.25 mcg    predniSONE tablet 40 mg       LABS    Recent Results (from the past 24 hour(s))   Basic metabolic panel    Collection Time: 09/19/18  4:02 AM   Result Value Ref Range    Sodium 127 (L) 136 - 145 mmol/L    Potassium 4.4 3.5 - 5.1 mmol/L    Chloride 98 95 - 110 mmol/L    CO2 16 (L) 23 - 29 mmol/L    Glucose 115 (H) 70 - 110 mg/dL    BUN, Bld 117 (H) 6 - 20 mg/dL    Creatinine 9.8 (H) 0.5 - 1.4 mg/dL    Calcium 7.6 (L) 8.7 - 10.5 mg/dL    Anion Gap 13 8 - 16 mmol/L    eGFR if African American 6 (A) >60 mL/min/1.73 m^2    eGFR if non African American 5 (A) >60 mL/min/1.73 m^2   ]    I/O last 3 completed shifts:  In: 1858 [P.O.:1858]  Out: 1450 [Urine:1450]    Vitals:    09/18/18 2303 09/19/18 0435 09/19/18 0727 09/19/18 1126   BP: (!) 161/75 123/79 130/81 133/71   Pulse: 80 76 76 81   Resp: 18 18 16 16   Temp: 98.3 °F (36.8 °C) 98.1 °F (36.7 °C) 98 °F (36.7 °C) 97 °F (36.1 °C)   TempSrc: Oral Oral Oral Oral   SpO2: 100% 99% 99% 99%   Weight:       Height:           No Jvd, Thyromegaly or Lymphadenopathy  Lungs: Fairly clear anteriorly and laterally  Cor: RRR no G or rubs  Abd: Soft benign good bowel sounds non tender  Ext: No E C C    A)  Oscar on ckd 3b-4  Gout  HTN  Anemia  Thrombocytopenia better  Hyponatremia  Met acidosis  2nd  hyperpth    P)  Renal diet  Consult IR  HD cath  HD

## 2018-09-19 NOTE — SUBJECTIVE & OBJECTIVE
Interval History: No new complaints.    Review of Systems   HENT: Negative for ear discharge and ear pain.    Eyes: Negative for pain and itching.   Endocrine: Negative for polyphagia and polyuria.   Neurological: Negative for seizures and syncope.     Objective:     Vital Signs (Most Recent):  Temp: 97 °F (36.1 °C) (09/19/18 1532)  Pulse: 89 (09/19/18 1532)  Resp: 16 (09/19/18 1532)  BP: 130/76 (09/19/18 1532)  SpO2: 100 % (09/19/18 1532) Vital Signs (24h Range):  Temp:  [97 °F (36.1 °C)-98.3 °F (36.8 °C)] 97 °F (36.1 °C)  Pulse:  [] 89  Resp:  [16-18] 16  SpO2:  [99 %-100 %] 100 %  BP: (123-161)/(71-88) 130/76     Weight: 59.4 kg (131 lb)  Body mass index is 21.14 kg/m².    Intake/Output Summary (Last 24 hours) at 9/19/2018 1730  Last data filed at 9/19/2018 1500  Gross per 24 hour   Intake 2727 ml   Output 1410 ml   Net 1317 ml      Physical Exam   Constitutional: He is oriented to person, place, and time. He appears well-developed and well-nourished.   HENT:   Head: Normocephalic and atraumatic.   Mouth/Throat: Oropharynx is clear and moist.   Eyes: EOM are normal. Pupils are equal, round, and reactive to light.   Neck: Normal range of motion. Neck supple. No JVD present.   Cardiovascular: Normal rate, regular rhythm, normal heart sounds and intact distal pulses.   Pulmonary/Chest: Effort normal and breath sounds normal. No respiratory distress.   Abdominal: Soft. Bowel sounds are normal. He exhibits no distension and no mass. There is no tenderness. There is no rebound and no guarding.   Musculoskeletal: Normal range of motion. He exhibits no edema.   Neurological: He is alert and oriented to person, place, and time.   Skin: Skin is warm and dry. Capillary refill takes less than 2 seconds. He is not diaphoretic.   Psychiatric: He has a normal mood and affect. His behavior is normal.       Significant Labs:   BMP:   Recent Labs   Lab  09/19/18   0402   GLU  115*   NA  127*   K  4.4   CL  98   CO2  16*    BUN  117*   CREATININE  9.8*   CALCIUM  7.6*     CBC:   No results for input(s): WBC, HGB, HCT, PLT in the last 48 hours.

## 2018-09-19 NOTE — PROGRESS NOTES
"[facility] [place]  Progress Note    Patient Name: Duran Mccauley  MRN: [id number]  Patient Class: IP- Inpatient   Admission Date: 9/12/2018  Length of Stay: 7 days  Attending Physician: Celestine Arora MD  Primary Care Provider: Patrick Cook MD        Subjective:     Principal Problem:Gastrointestinal hemorrhage    HPI:  59 yo male with HNT, h/o HBV, chronic gout and HLP presented with c/o dark black stools x 5 days, fatigue and weakness. He went to PCP/NP 2 days ago and was told to take "medication" for stools and prednisone for gout flare. He was set up for outpatient c/scope 10/9.  He was concerned about his decreased appetite, diarrhea and weight loss. He denies abdominal pain, hematemesis, fever, chills, N/V.  He was taking indomethicin and ibuprofen intermittently for gout flares over past year. On admit h/h 7.5/21.3.  Coags normal. BUN/Cr 78/5.8.  Albumin 2.9.  GI consulted. Renal u/s pending. Transfusing one unit blood.     Hospital Course:  Pt admitted with ARF and anemia/diarrhea. Underwent c/scope 9/13. Transfuse one unit with inappropriate response in h/h. Plan to transfuse two units PRBCs today. Biopsy pending +/- steroids and mesalamine. Nephrology consulted, marginal improvement in renal function. Serum uric acid level elevated and urine level normal. No acute flare appreciated.   H/h improved; nephrology counseled on diet for gout; monitoring labs; gout flare to right wrist- started on prednisone   9/16 - pt stool is formed and lab cannot run a c.diff study, precautions discontinued. Cr trended up and likely needs biopsy. Gout flare improved   9/17- renal function worsening, GI placed on prednisone  Worsening renal function.  Nephrology consulted IR for HD catheter placement.    Interval History: No new complaints.    Review of Systems   HENT: Negative for ear discharge and ear pain.    Eyes: Negative for pain and itching.   Endocrine: Negative for polyphagia and " polyuria.   Neurological: Negative for seizures and syncope.     Objective:     Vital Signs (Most Recent):  Temp: 97 °F (36.1 °C) (09/19/18 1532)  Pulse: 89 (09/19/18 1532)  Resp: 16 (09/19/18 1532)  BP: 130/76 (09/19/18 1532)  SpO2: 100 % (09/19/18 1532) Vital Signs (24h Range):  Temp:  [97 °F (36.1 °C)-98.3 °F (36.8 °C)] 97 °F (36.1 °C)  Pulse:  [] 89  Resp:  [16-18] 16  SpO2:  [99 %-100 %] 100 %  BP: (123-161)/(71-88) 130/76     Weight: 59.4 kg (131 lb)  Body mass index is 21.14 kg/m².    Intake/Output Summary (Last 24 hours) at 9/19/2018 1730  Last data filed at 9/19/2018 1500  Gross per 24 hour   Intake 2727 ml   Output 1410 ml   Net 1317 ml      Physical Exam   Constitutional: He is oriented to person, place, and time. He appears well-developed and well-nourished.   HENT:   Head: Normocephalic and atraumatic.   Mouth/Throat: Oropharynx is clear and moist.   Eyes: EOM are normal. Pupils are equal, round, and reactive to light.   Neck: Normal range of motion. Neck supple. No JVD present.   Cardiovascular: Normal rate, regular rhythm, normal heart sounds and intact distal pulses.   Pulmonary/Chest: Effort normal and breath sounds normal. No respiratory distress.   Abdominal: Soft. Bowel sounds are normal. He exhibits no distension and no mass. There is no tenderness. There is no rebound and no guarding.   Musculoskeletal: Normal range of motion. He exhibits no edema.   Neurological: He is alert and oriented to person, place, and time.   Skin: Skin is warm and dry. Capillary refill takes less than 2 seconds. He is not diaphoretic.   Psychiatric: He has a normal mood and affect. His behavior is normal.       Significant Labs:   BMP:   Recent Labs   Lab  09/19/18   0402   GLU  115*   NA  127*   K  4.4   CL  98   CO2  16*   BUN  117*   CREATININE  9.8*   CALCIUM  7.6*     CBC:   No results for input(s): WBC, HGB, HCT, PLT in the last 48 hours.      Assessment/Plan:      * Gastrointestinal hemorrhage     Appreciate GI input.  Pan colitis per C scope.  Possible UC and started on steroids.  Pathology returned as nonspecific but mentioned inflammatory bowel disease was mentioned as a possibility  H/H stable post transfusion.            Acute renal failure    10 months ago normal renal function  Possibly secondary to NSAID use.  Worsening Creat.  Patient states he is making good amount of urine, but it's not being calculated.  He started a log.  Nephrology following.  Creat continues to worsen and IR consulted for HD catheter placement.          Malnutrition of moderate degree    BOOST TID          Weight loss, unintentional      See above         Thrombocytopenia    Hold heparin/lovenox for DVT prophylaxis  h/o hepatitis B but treated 2010 - liver disease, chronic anemia  HIV - negative   Ivana/scd for DVT prophylaxis               Acute blood loss anemia    Adequate correction of H/H with transfusion.  H/H now stable.        Chronic gout of multiple sites    Uric acid level 13.4  Prednisone for acute flare  No NSAIDS or ibuprofen or indomethicin             Pure hypercholesterolemia    Resume statin         Essential (primary) hypertension    Continue current management.            VTE Risk Mitigation (From admission, onward)        Ordered     IP VTE HIGH RISK PATIENT  Once      09/12/18 2341     Place IVANA hose  Until discontinued      09/12/18 2341     Place sequential compression device  Until discontinued      09/12/18 2341              Celestine Arora MD  Department of Hospital Medicine   [facility]

## 2018-09-20 LAB
ANION GAP SERPL CALC-SCNC: 13 MMOL/L
BASOPHILS # BLD AUTO: 0.03 K/UL
BASOPHILS NFR BLD: 0.2 %
BUN SERPL-MCNC: 118 MG/DL
CALCIUM SERPL-MCNC: 8 MG/DL
CHLORIDE SERPL-SCNC: 97 MMOL/L
CO2 SERPL-SCNC: 17 MMOL/L
CREAT SERPL-MCNC: 10.1 MG/DL
DIFFERENTIAL METHOD: ABNORMAL
EOSINOPHIL # BLD AUTO: 0 K/UL
EOSINOPHIL NFR BLD: 0.1 %
ERYTHROCYTE [DISTWIDTH] IN BLOOD BY AUTOMATED COUNT: 15.2 %
EST. GFR  (AFRICAN AMERICAN): 6 ML/MIN/1.73 M^2
EST. GFR  (NON AFRICAN AMERICAN): 5 ML/MIN/1.73 M^2
GLUCOSE SERPL-MCNC: 108 MG/DL
HCT VFR BLD AUTO: 24 %
HEPATITIS B VIRAL DNA - QUANTITATIVE: <10 IU/ML
HEPATITIS B VIRUS DNA: DETECTED
HGB BLD-MCNC: 8.7 G/DL
LOG HBV IU/ML: <1 LOG (10) IU/ML
LYMPHOCYTES # BLD AUTO: 0.9 K/UL
LYMPHOCYTES NFR BLD: 7.3 %
MCH RBC QN AUTO: 30.1 PG
MCHC RBC AUTO-ENTMCNC: 36.3 G/DL
MCV RBC AUTO: 83 FL
MONOCYTES # BLD AUTO: 0.8 K/UL
MONOCYTES NFR BLD: 6.1 %
NEUTROPHILS # BLD AUTO: 11.1 K/UL
NEUTROPHILS NFR BLD: 90.5 %
PLATELET # BLD AUTO: 77 K/UL
PMV BLD AUTO: ABNORMAL FL
POTASSIUM SERPL-SCNC: 4.5 MMOL/L
RBC # BLD AUTO: 2.89 M/UL
SODIUM SERPL-SCNC: 127 MMOL/L
WBC # BLD AUTO: 12.86 K/UL

## 2018-09-20 PROCEDURE — 87516 HEPATITIS B DNA AMP PROBE: CPT

## 2018-09-20 PROCEDURE — 63600175 PHARM REV CODE 636 W HCPCS: Performed by: HOSPITALIST

## 2018-09-20 PROCEDURE — 83520 IMMUNOASSAY QUANT NOS NONAB: CPT | Mod: 59

## 2018-09-20 PROCEDURE — 25000003 PHARM REV CODE 250: Performed by: INTERNAL MEDICINE

## 2018-09-20 PROCEDURE — 81479 UNLISTED MOLECULAR PATHOLOGY: CPT

## 2018-09-20 PROCEDURE — 25000003 PHARM REV CODE 250: Performed by: HOSPITALIST

## 2018-09-20 PROCEDURE — 11000001 HC ACUTE MED/SURG PRIVATE ROOM

## 2018-09-20 PROCEDURE — 80048 BASIC METABOLIC PNL TOTAL CA: CPT

## 2018-09-20 PROCEDURE — 90935 HEMODIALYSIS ONE EVALUATION: CPT

## 2018-09-20 PROCEDURE — 63600175 PHARM REV CODE 636 W HCPCS: Performed by: INTERNAL MEDICINE

## 2018-09-20 PROCEDURE — 36415 COLL VENOUS BLD VENIPUNCTURE: CPT

## 2018-09-20 RX ORDER — HEPARIN SODIUM 5000 [USP'U]/ML
5000 INJECTION, SOLUTION INTRAVENOUS; SUBCUTANEOUS
Status: DISCONTINUED | OUTPATIENT
Start: 2018-09-20 | End: 2018-09-24 | Stop reason: HOSPADM

## 2018-09-20 RX ORDER — CLONIDINE HYDROCHLORIDE 0.1 MG/1
0.1 TABLET ORAL EVERY 8 HOURS PRN
Status: DISCONTINUED | OUTPATIENT
Start: 2018-09-20 | End: 2018-09-24 | Stop reason: HOSPADM

## 2018-09-20 RX ADMIN — HEPARIN SODIUM 5000 UNITS: 5000 INJECTION, SOLUTION INTRAVENOUS; SUBCUTANEOUS at 05:09

## 2018-09-20 RX ADMIN — CALCITRIOL 0.25 MCG: 0.25 CAPSULE, LIQUID FILLED ORAL at 09:09

## 2018-09-20 RX ADMIN — AMLODIPINE BESYLATE 10 MG: 5 TABLET ORAL at 09:09

## 2018-09-20 RX ADMIN — PREDNISONE 30 MG: 5 TABLET ORAL at 09:09

## 2018-09-20 RX ADMIN — ATORVASTATIN CALCIUM 20 MG: 10 TABLET, FILM COATED ORAL at 09:09

## 2018-09-20 NOTE — SUBJECTIVE & OBJECTIVE
Interval History: Doing well.    Review of Systems   HENT: Negative for ear discharge and ear pain.    Eyes: Negative for pain and itching.   Endocrine: Negative for polyphagia and polyuria.   Neurological: Negative for seizures and syncope.     Objective:     Vital Signs (Most Recent):  Temp: 98 °F (36.7 °C) (09/20/18 1345)  Pulse: 77 (09/20/18 1700)  Resp: 17 (09/20/18 1700)  BP: (!) 148/100 (09/20/18 1700)  SpO2: 100 % (09/20/18 1102) Vital Signs (24h Range):  Temp:  [97.7 °F (36.5 °C)-98.2 °F (36.8 °C)] 98 °F (36.7 °C)  Pulse:  [74-98] 77  Resp:  [17-20] 17  SpO2:  [97 %-100 %] 100 %  BP: (117-155)/() 148/100     Weight: 59.4 kg (131 lb)  Body mass index is 21.14 kg/m².    Intake/Output Summary (Last 24 hours) at 9/20/2018 1707  Last data filed at 9/20/2018 1026  Gross per 24 hour   Intake 360 ml   Output 1325 ml   Net -965 ml      Physical Exam   Constitutional: He is oriented to person, place, and time. He appears well-developed and well-nourished.   HENT:   Head: Normocephalic and atraumatic.   Mouth/Throat: Oropharynx is clear and moist.   Eyes: EOM are normal. Pupils are equal, round, and reactive to light.   Neck: Normal range of motion. Neck supple. No JVD present.   Cardiovascular: Normal rate, regular rhythm, normal heart sounds and intact distal pulses.   Pulmonary/Chest: Effort normal and breath sounds normal. No respiratory distress.   Abdominal: Soft. Bowel sounds are normal. He exhibits no distension and no mass. There is no tenderness. There is no rebound and no guarding.   Musculoskeletal: Normal range of motion. He exhibits no edema.   Neurological: He is alert and oriented to person, place, and time.   Skin: Skin is warm and dry. Capillary refill takes less than 2 seconds. He is not diaphoretic.   Psychiatric: He has a normal mood and affect. His behavior is normal.       Significant Labs:   BMP:   Recent Labs   Lab  09/20/18   0430   GLU  108   NA  127*   K  4.5   CL  97   CO2  17*   BUN   118*   CREATININE  10.1*   CALCIUM  8.0*     CBC:   Recent Labs   Lab  09/19/18   2317   WBC  12.86*   HGB  8.7*   HCT  24.0*   PLT  77*

## 2018-09-20 NOTE — PLAN OF CARE
Problem: Hemodialysis (Adult)  Goal: Signs and Symptoms of Listed Potential Problems Will be Absent, Minimized or Managed (Hemodialysis)  Signs and symptoms of listed potential problems will be absent, minimized or managed by discharge/transition of care (reference Hemodialysis (Adult) CPG).  Outcome: Ongoing (interventions implemented as appropriate)   09/20/18 1757   Hemodialysis   Problems Assessed (Hemodialysis) all   Problems Present (Hemodialysis) electrolyte imbalance;fluid imbalance   1st hemodialysis for 3 1/2 hours with 1 liter of fluid removal tolerated well

## 2018-09-20 NOTE — PROGRESS NOTES
SW to pt's room to verify dialysis clinic. According to pt, the closet to his home if [facility] on [address].  SARAH faxed face sheet, labs, dialysis report, mar, and progress notes to [facility] @ [phone]. SARAH awaiting to determine if services will be provided.

## 2018-09-20 NOTE — ASSESSMENT & PLAN NOTE
10 months ago normal renal function.  Possibly secondary to NSAID use.  Unsure if he developed CKD during this time.  Unable to determine stage.  Worsening Creat.  Patient states he is making good amount of urine, but it's not being calculated.  He started a log.  Nephrology following.  Creat continues to worsen and IR consulted for HD catheter placement.  HD catheter placed and patient undergoing dialysis.

## 2018-09-20 NOTE — NURSING
Received report from [place] in dialysis. I L of fluid removed. No acute distress noted. Will continue to monitor.

## 2018-09-20 NOTE — PROGRESS NOTES
Awake alert oriented NAD    New HD cath R IJ done Yesterday    Denies CNS ENT CP GI  RHEUM OR DERM SX  Past Medical History:   Diagnosis Date    Eczema     Hepatitis B     treated 2010s and negative RNA load afterward    Hyperlipidemia     Hypertension      Review of patient's allergies indicates:  No Known Allergies    Current Facility-Administered Medications   Medication    0.9%  NaCl infusion (for blood administration)    0.9%  NaCl infusion (for blood administration)    amLODIPine tablet 10 mg    atorvastatin tablet 20 mg    calcitRIOL capsule 0.25 mcg    predniSONE tablet 30 mg       LABS    Recent Results (from the past 24 hour(s))   CBC auto differential    Collection Time: 09/19/18 11:17 PM   Result Value Ref Range    WBC 12.86 (H) 3.90 - 12.70 K/uL    RBC 2.89 (L) 4.60 - 6.20 M/uL    Hemoglobin 8.7 (L) 14.0 - 18.0 g/dL    Hematocrit 24.0 (L) 40.0 - 54.0 %    MCV 83 82 - 98 fL    MCH 30.1 27.0 - 31.0 pg    MCHC 36.3 (H) 32.0 - 36.0 g/dL    RDW 15.2 (H) 11.5 - 14.5 %    Platelets 77 (L) 150 - 350 K/uL    MPV SEE COMMENT 9.2 - 12.9 fL    Gran # (ANC) 11.1 (H) 1.8 - 7.7 K/uL    Lymph # 0.9 (L) 1.0 - 4.8 K/uL    Mono # 0.8 0.3 - 1.0 K/uL    Eos # 0.0 0.0 - 0.5 K/uL    Baso # 0.03 0.00 - 0.20 K/uL    Gran% 90.5 (H) 38.0 - 73.0 %    Lymph% 7.3 (L) 18.0 - 48.0 %    Mono% 6.1 4.0 - 15.0 %    Eosinophil% 0.1 0.0 - 8.0 %    Basophil% 0.2 0.0 - 1.9 %    Differential Method Automated    Basic metabolic panel    Collection Time: 09/20/18  4:30 AM   Result Value Ref Range    Sodium 127 (L) 136 - 145 mmol/L    Potassium 4.5 3.5 - 5.1 mmol/L    Chloride 97 95 - 110 mmol/L    CO2 17 (L) 23 - 29 mmol/L    Glucose 108 70 - 110 mg/dL    BUN, Bld 118 (H) 6 - 20 mg/dL    Creatinine 10.1 (H) 0.5 - 1.4 mg/dL    Calcium 8.0 (L) 8.7 - 10.5 mg/dL    Anion Gap 13 8 - 16 mmol/L    eGFR if African American 6 (A) >60 mL/min/1.73 m^2    eGFR if non African American 5 (A) >60 mL/min/1.73 m^2   ]    I/O last 3 completed  shifts:  In: 1467 [P.O.:1467]  Out: 2310 [Urine:2310]    Vitals:    09/19/18 1929 09/19/18 2324 09/20/18 0339 09/20/18 0724   BP: 126/73 117/66 136/78 130/71   Pulse: 82 80 80 74   Resp: 20 18 18 18   Temp: 97.9 °F (36.6 °C) 98.2 °F (36.8 °C) 98 °F (36.7 °C) 97.7 °F (36.5 °C)   TempSrc: Oral Oral Oral Oral   SpO2: 100% 99% 97% 100%   Weight:       Height:           No Jvd, Thyromegaly or Lymphadenopathy  Lungs: Fairly clear anteriorly and laterally  Cor: RRR no G or rubs  Abd: Soft benign good bowel sounds non tender  Ext: No E C C    A)  voiding quite a bit but his Creat is worst yet  Tells me that about 4-5 y ago he had a K bx at  [facility]. Pt unclear of results.  DIANE  Met acidosis  Anemia  Gout  Hx of hep b  Smallish kidneys on sono  Severe proteinuria >5 grams  Protein malnutrition  Thrombocytopenia  High pth      P)  1st hd today   Arrange for out pt hd home as soon as accepted in the HD unit

## 2018-09-20 NOTE — PLAN OF CARE
Problem: Patient Care Overview  Goal: Plan of Care Review  Outcome: Ongoing (interventions implemented as appropriate)   09/20/18 5749   Coping/Psychosocial   Plan Of Care Reviewed With patient     Patient remained free from falls, trauma, and injuries throughout shift. No complaints of pain, nausea, or vomiting. Medications administered as prescribed. Ambulated around room independently. IL of fluid removed in dialysis; tolerated well. No acute distress noted.

## 2018-09-20 NOTE — PROGRESS NOTES
"[facility]  Progress Note    Patient Name: Duran Mccauley  MRN: [id number]  Patient Class: IP- Inpatient   Admission Date: 9/12/2018  Length of Stay: 8 days  Attending Physician: Celestine Arora MD  Primary Care Provider: Patrick Cook MD        Subjective:     Principal Problem:Gastrointestinal hemorrhage    HPI:  57 yo male with HNT, h/o HBV, chronic gout and HLP presented with c/o dark black stools x 5 days, fatigue and weakness. He went to PCP/NP 2 days ago and was told to take "medication" for stools and prednisone for gout flare. He was set up for outpatient c/scope 10/9.  He was concerned about his decreased appetite, diarrhea and weight loss. He denies abdominal pain, hematemesis, fever, chills, N/V.  He was taking indomethicin and ibuprofen intermittently for gout flares over past year. On admit h/h 7.5/21.3.  Coags normal. BUN/Cr 78/5.8.  Albumin 2.9.  GI consulted. Renal u/s pending. Transfusing one unit blood.     Hospital Course:  Pt admitted with ARF and anemia/diarrhea. Underwent c/scope 9/13. Transfuse one unit with inappropriate response in h/h. Plan to transfuse two units PRBCs today. Biopsy pending +/- steroids and mesalamine. Nephrology consulted, marginal improvement in renal function. Serum uric acid level elevated and urine level normal. No acute flare appreciated.   H/h improved; nephrology counseled on diet for gout; monitoring labs; gout flare to right wrist- started on prednisone   9/16 - pt stool is formed and lab cannot run a c.diff study, precautions discontinued. Cr trended up and likely needs biopsy. Gout flare improved   9/17- renal function worsening, GI placed on prednisone  Worsening renal function.  Nephrology consulted IR for HD catheter placement.  9/20: Catheter placed and patient started on dialysis.    Interval History: Doing well.    Review of Systems   HENT: Negative for ear discharge and ear pain.    Eyes: Negative for pain and " itching.   Endocrine: Negative for polyphagia and polyuria.   Neurological: Negative for seizures and syncope.     Objective:     Vital Signs (Most Recent):  Temp: 98 °F (36.7 °C) (09/20/18 1345)  Pulse: 77 (09/20/18 1700)  Resp: 17 (09/20/18 1700)  BP: (!) 148/100 (09/20/18 1700)  SpO2: 100 % (09/20/18 1102) Vital Signs (24h Range):  Temp:  [97.7 °F (36.5 °C)-98.2 °F (36.8 °C)] 98 °F (36.7 °C)  Pulse:  [74-98] 77  Resp:  [17-20] 17  SpO2:  [97 %-100 %] 100 %  BP: (117-155)/() 148/100     Weight: 59.4 kg (131 lb)  Body mass index is 21.14 kg/m².    Intake/Output Summary (Last 24 hours) at 9/20/2018 1707  Last data filed at 9/20/2018 1026  Gross per 24 hour   Intake 360 ml   Output 1325 ml   Net -965 ml      Physical Exam   Constitutional: He is oriented to person, place, and time. He appears well-developed and well-nourished.   HENT:   Head: Normocephalic and atraumatic.   Mouth/Throat: Oropharynx is clear and moist.   Eyes: EOM are normal. Pupils are equal, round, and reactive to light.   Neck: Normal range of motion. Neck supple. No JVD present.   Cardiovascular: Normal rate, regular rhythm, normal heart sounds and intact distal pulses.   Pulmonary/Chest: Effort normal and breath sounds normal. No respiratory distress.   Abdominal: Soft. Bowel sounds are normal. He exhibits no distension and no mass. There is no tenderness. There is no rebound and no guarding.   Musculoskeletal: Normal range of motion. He exhibits no edema.   Neurological: He is alert and oriented to person, place, and time.   Skin: Skin is warm and dry. Capillary refill takes less than 2 seconds. He is not diaphoretic.   Psychiatric: He has a normal mood and affect. His behavior is normal.       Significant Labs:   BMP:   Recent Labs   Lab  09/20/18   0430   GLU  108   NA  127*   K  4.5   CL  97   CO2  17*   BUN  118*   CREATININE  10.1*   CALCIUM  8.0*     CBC:   Recent Labs   Lab  09/19/18   2317   WBC  12.86*   HGB  8.7*   HCT  24.0*    PLT  77*         Assessment/Plan:      * Gastrointestinal hemorrhage    Appreciate GI input.  Pan colitis per C scope.  Possible UC and started on steroids.  Pathology returned as nonspecific but mentioned inflammatory bowel disease was mentioned as a possibility  H/H stable post transfusion.            Acute renal failure    10 months ago normal renal function.  Possibly secondary to NSAID use.  Unsure if he developed CKD during this time.  Unable to determine stage.  Worsening Creat.  Patient states he is making good amount of urine, but it's not being calculated.  He started a log.  Nephrology following.  Creat continues to worsen and IR consulted for HD catheter placement.  HD catheter placed and patient undergoing dialysis.          Malnutrition of moderate degree    BOOST TID          Weight loss, unintentional      See above         Thrombocytopenia    Hold heparin/lovenox for DVT prophylaxis  h/o hepatitis B but treated 2010 - liver disease, chronic anemia  HIV - negative   Ivana/scd for DVT prophylaxis               Acute blood loss anemia    Adequate correction of H/H with transfusion.  H/H now stable.        Chronic gout of multiple sites    Uric acid level 13.4  Prednisone for acute flare  No NSAIDS or ibuprofen or indomethicin             Pure hypercholesterolemia    Resume statin         Essential (primary) hypertension    Continue current management.            VTE Risk Mitigation (From admission, onward)        Ordered     heparin (porcine) injection 5,000 Units  As needed (PRN)      09/20/18 1106     IP VTE HIGH RISK PATIENT  Once      09/12/18 2341     Place IVANA hose  Until discontinued      09/12/18 2341     Place sequential compression device  Until discontinued      09/12/18 2341              Celestine Arora MD  Department of Hospital Medicine   [facility]

## 2018-09-20 NOTE — PHYSICIAN QUERY
"PT Name: Duran Mccauley  MR #: [id number]  Physician Query Form - CKD Clarification     Eunice Prince RN, CCDS  Contact Info: [phone]  [email or web address]    This form is a permanent document in the medical record.     Query Date: September 20, 2018    By submitting this query, we are merely seeking further clarification of documentation. Please utilize your independent clinical judgment when addressing the question(s) below.    The Medical record contains the following:   Indicators Supporting Clinical Findings Location in Medical Record   x CKD or Chronic Kidney (Renal) Failure / Disease Oscar on ckd 3b-4  Creat worst situation discussed with pt  He opted for HD   Renal Pn 9/19   x BUN/Creatinine                          GFR  9/12 9/14 9/15 9/16 9/17 9/18 9/20   Bun 78 (H) 72 (H) 63 (H) 74 (H) 90 (H) 107 (H) 118 (H)   Cr 5.8 (H) 5.1 (H) 5.7 (H) 7.3 (H) 8.6 (H) 9.2 (H) 10.1 (H)   gfr 11 (A) 13 (A) 12 (A) 9 (A) 7 (A) 6 (A) 6 (A)    Lab    Dehydration      Nausea / Vomiting      Dialysis / CRRT      Medication     x Treatment tunneled HD catheter placement  via right IJ access  Nephrology Consult   IR H&P 9/19     Orders   x Other Chronic Conditions Gout  HTN  Anemia  Thrombocytopenia better  Hyponatremia  Met acidosis  2nd hyperpth    Malnutrition of moderate degree  Gastrointestinal hemorrhage  Pan colitis per C scope.  Possible UC and started on steroids. Renal Pn 9/19                  Hosp Med Pn 9/18   x Other Acute renal failure   10 months ago normal renal function  Possibly secondary to NSAID use.  Worsening Creat. Hosp Med Pn 9/19     Provider, please further specify the stage of CKD.     Please clarify "CKD" stage.     Chronic Kidney Disease (CKD) (please specify stage* below)       [employer] Definitions  Stage Description  eGFR (mL/min)   [  ]     I Slight kidney damage with normal or increased filtration 90+   [  ]     II Mildly reduced kidney function 60-89   [  ]     III " Moderately reduced kidney function 30-59   [  ]     IV Severely reduced kidney function 15-29   [  ]     V Kidney failure, requiring transplant or dialysis <15     [  ] End Stage Renal Disease (ESRD)    [  ] Other (please specify): ________________________    [ x ] Clinically Undetermined    Please document in your progress notes daily for the duration of treatment until resolved and include in your discharge summary.

## 2018-09-20 NOTE — NURSING
Call placed to MD due to patient blood pressure being 160/90 with no prn medication order.     1825  Received call back from Dr. Arora. No new orders given.

## 2018-09-21 LAB — PHOSPHATE SERPL-MCNC: 3.8 MG/DL

## 2018-09-21 PROCEDURE — 84100 ASSAY OF PHOSPHORUS: CPT

## 2018-09-21 PROCEDURE — 36415 COLL VENOUS BLD VENIPUNCTURE: CPT

## 2018-09-21 PROCEDURE — 63600175 PHARM REV CODE 636 W HCPCS: Performed by: HOSPITALIST

## 2018-09-21 PROCEDURE — 84165 PROTEIN E-PHORESIS SERUM: CPT

## 2018-09-21 PROCEDURE — 11000001 HC ACUTE MED/SURG PRIVATE ROOM

## 2018-09-21 PROCEDURE — 84165 PROTEIN E-PHORESIS SERUM: CPT | Mod: 26,,, | Performed by: PATHOLOGY

## 2018-09-21 PROCEDURE — 25000003 PHARM REV CODE 250: Performed by: INTERNAL MEDICINE

## 2018-09-21 PROCEDURE — 25000003 PHARM REV CODE 250: Performed by: HOSPITALIST

## 2018-09-21 RX ADMIN — PREDNISONE 30 MG: 5 TABLET ORAL at 09:09

## 2018-09-21 RX ADMIN — CALCITRIOL 0.25 MCG: 0.25 CAPSULE, LIQUID FILLED ORAL at 09:09

## 2018-09-21 RX ADMIN — ATORVASTATIN CALCIUM 20 MG: 10 TABLET, FILM COATED ORAL at 09:09

## 2018-09-21 RX ADMIN — AMLODIPINE BESYLATE 10 MG: 5 TABLET ORAL at 09:09

## 2018-09-21 NOTE — PROGRESS NOTES
"[facility] Medicine  Progress Note    Patient Name: Duran Mccauley  MRN: [id number]  Patient Class: IP- Inpatient   Admission Date: 9/12/2018  Length of Stay: 9 days  Attending Physician: Celestine Arora MD  Primary Care Provider: Patrick Cook MD        Subjective:     Principal Problem:Gastrointestinal hemorrhage    HPI:  57 yo male with HNT, h/o HBV, chronic gout and HLP presented with c/o dark black stools x 5 days, fatigue and weakness. He went to PCP/NP 2 days ago and was told to take "medication" for stools and prednisone for gout flare. He was set up for outpatient c/scope 10/9.  He was concerned about his decreased appetite, diarrhea and weight loss. He denies abdominal pain, hematemesis, fever, chills, N/V.  He was taking indomethicin and ibuprofen intermittently for gout flares over past year. On admit h/h 7.5/21.3.  Coags normal. BUN/Cr 78/5.8.  Albumin 2.9.  GI consulted. Renal u/s pending. Transfusing one unit blood.     Hospital Course:  Pt admitted with ARF and anemia/diarrhea. Underwent c/scope 9/13. Transfuse one unit with inappropriate response in h/h. Plan to transfuse two units PRBCs today. Biopsy pending +/- steroids and mesalamine. Nephrology consulted, marginal improvement in renal function. Serum uric acid level elevated and urine level normal. No acute flare appreciated.   H/h improved; nephrology counseled on diet for gout; monitoring labs; gout flare to right wrist- started on prednisone   9/16 - pt stool is formed and lab cannot run a c.diff study, precautions discontinued. Cr trended up and likely needs biopsy. Gout flare improved   9/17- renal function worsening, GI placed on prednisone  Worsening renal function.  Nephrology consulted IR for HD catheter placement.  9/20: Catheter placed and patient started on dialysis.    Interval History: No issues overnight.    Review of Systems   HENT: Negative for ear discharge and ear pain.    Eyes: Negative for " pain and itching.   Endocrine: Negative for polyphagia and polyuria.   Neurological: Negative for seizures and syncope.     Objective:     Vital Signs (Most Recent):  Temp: 98.7 °F (37.1 °C) (09/21/18 1041)  Pulse: 85 (09/21/18 1041)  Resp: 18 (09/21/18 1041)  BP: (!) 142/78 (09/21/18 1041)  SpO2: 99 % (09/21/18 1041) Vital Signs (24h Range):  Temp:  [97.5 °F (36.4 °C)-98.7 °F (37.1 °C)] 98.7 °F (37.1 °C)  Pulse:  [77-85] 85  Resp:  [16-18] 18  SpO2:  [98 %-100 %] 99 %  BP: (122-160)/() 142/78     Weight: 59.4 kg (130 lb 15.3 oz)  Body mass index is 21.14 kg/m².    Intake/Output Summary (Last 24 hours) at 9/21/2018 1621  Last data filed at 9/21/2018 1306  Gross per 24 hour   Intake 1220 ml   Output 2725 ml   Net -1505 ml      Physical Exam   Constitutional: He is oriented to person, place, and time. He appears well-developed and well-nourished.   HENT:   Head: Normocephalic and atraumatic.   Mouth/Throat: Oropharynx is clear and moist.   Eyes: EOM are normal. Pupils are equal, round, and reactive to light.   Neck: Normal range of motion. Neck supple. No JVD present.   Cardiovascular: Normal rate, regular rhythm, normal heart sounds and intact distal pulses.   Pulmonary/Chest: Effort normal and breath sounds normal. No respiratory distress.   Abdominal: Soft. Bowel sounds are normal. He exhibits no distension and no mass. There is no tenderness. There is no rebound and no guarding.   Musculoskeletal: Normal range of motion. He exhibits no edema.   Neurological: He is alert and oriented to person, place, and time.   Skin: Skin is warm and dry. Capillary refill takes less than 2 seconds. He is not diaphoretic.   Psychiatric: He has a normal mood and affect. His behavior is normal.       Significant Labs:   BMP:   Recent Labs   Lab  09/20/18   0430   GLU  108   NA  127*   K  4.5   CL  97   CO2  17*   BUN  118*   CREATININE  10.1*   CALCIUM  8.0*     CBC:   Recent Labs   Lab  09/19/18   2317   WBC  12.86*   HGB   8.7*   HCT  24.0*   PLT  77*         Assessment/Plan:      * Gastrointestinal hemorrhage    Appreciate GI input.  Pan colitis per C scope.  Possible UC and started on steroids.  Pathology returned as nonspecific but mentioned inflammatory bowel disease was mentioned as a possibility  H/H stable post transfusion.            Acute renal failure    10 months ago normal renal function.  Possibly secondary to NSAID use.  Unsure if he developed CKD during this time.  Unable to determine stage.  Worsening Creat.  Patient states he is making good amount of urine, but it's not being calculated.  He started a log.  Nephrology following.  Creat continues to worsen and IR consulted for HD catheter placement.  HD catheter placed and patient started on dialysis.          Malnutrition of moderate degree    BOOST TID          Weight loss, unintentional      See above         Thrombocytopenia    Hold heparin/lovenox for DVT prophylaxis  h/o hepatitis B but treated 2010 - liver disease, chronic anemia  HIV - negative   Ivana/scd for DVT prophylaxis               Acute blood loss anemia    Adequate correction of H/H with transfusion.  H/H now stable.        Chronic gout of multiple sites    Uric acid level 13.4  Prednisone for acute flare  No NSAIDS or ibuprofen or indomethicin             Pure hypercholesterolemia    Resume statin         Essential (primary) hypertension    Continue current management.            VTE Risk Mitigation (From admission, onward)        Ordered     heparin (porcine) injection 5,000 Units  As needed (PRN)      09/20/18 1106     IP VTE HIGH RISK PATIENT  Once      09/12/18 2341     Place IVANA hose  Until discontinued      09/12/18 2341     Place sequential compression device  Until discontinued      09/12/18 2341              Celestine Arora MD  Department of Hospital Medicine   [facility]

## 2018-09-21 NOTE — PLAN OF CARE
Recommendations     Recommendation/Intervention:   1. Rec novasource renal bid   2. Provided renal diet education with handouts and f/u resources   3. RD to monitor      Goals: Meet >85% EEN  Nutrition Goal Status: new  Communication of RD Recs: (plan of care, second sign)     D/C planning: Renal diet with supplements as needed.

## 2018-09-21 NOTE — ASSESSMENT & PLAN NOTE
10 months ago normal renal function.  Possibly secondary to NSAID use.  Unsure if he developed CKD during this time.  Unable to determine stage.  Worsening Creat.  Patient states he is making good amount of urine, but it's not being calculated.  He started a log.  Nephrology following.  Creat continues to worsen and IR consulted for HD catheter placement.  HD catheter placed and patient started on dialysis.

## 2018-09-21 NOTE — PLAN OF CARE
Problem: Patient Care Overview  Goal: Plan of Care Review  Outcome: Ongoing (interventions implemented as appropriate)   09/21/18 0312   Coping/Psychosocial   Plan Of Care Reviewed With patient   Resting quietly during night, no report of pain or discomfort. Tolerating diet and drinking po fluids, family at bedside.    Problem: Fall Risk (Adult)  Goal: Absence of Falls  Patient will demonstrate the desired outcomes by discharge/transition of care.  Outcome: Ongoing (interventions implemented as appropriate)   09/21/18 0312   Fall Risk (Adult)   Absence of Falls making progress toward outcome   Ambulatory without assist, instructed to call when asisst needed.    Problem: Gastrointestinal Bleeding (Adult)  Goal: Signs and Symptoms of Listed Potential Problems Will be Absent, Minimized or Managed (Gastrointestinal Bleeding)  Signs and symptoms of listed potential problems will be absent, minimized or managed by discharge/transition of care (reference Gastrointestinal Bleeding (Adult) CPG).  Outcome: Ongoing (interventions implemented as appropriate)   09/21/18 0312   Gastrointestinal Bleeding   Problems Present (GI Bleeding) none   No report of bloody stools or emesis.    Problem: Hemodialysis (Adult)  Goal: Signs and Symptoms of Listed Potential Problems Will be Absent, Minimized or Managed (Hemodialysis)  Signs and symptoms of listed potential problems will be absent, minimized or managed by discharge/transition of care (reference Hemodialysis (Adult) CPG).   09/21/18 0312   Hemodialysis   Problems Assessed (Hemodialysis) situational response   Dialysis access to right subclavian, dry c/d/i. Treatments as scheduled.

## 2018-09-21 NOTE — PLAN OF CARE
09/21/18 1231   Discharge Reassessment   Assessment Type Discharge Planning Reassessment   Provided patient/caregiver education on the expected discharge date and the discharge plan No   Do you have any problems affording any of your prescribed medications? No   Discharge Plan A Home with family   Discharge Plan B Home with family   Patient choice form signed by patient/caregiver No   Can the patient answer the patient profile reliably? Yes, cognitively intact   How does the patient rate their overall health at the present time? Fair   Describe the patient's ability to walk at the present time. No restrictions   How often would a person be available to care for the patient? Whenever needed   Number of comorbid conditions (as recorded on the chart) Two   During the past month, has the patient often been bothered by feeling down, depressed or hopeless? No   During the past month, has the patient often been bothered by little interest or pleasure in doing things? No     SARAH contacted Srikanth @ [phone] to determine the status of referral. According to Erica, it appears pt has been accepted to preferred facility, however, they provided pt with 11:15AM chair time. SARAH explained pt works, and asked if 7:00AM is available on TT. Erica explained, [facility], is working on the case and will have SW contact SARAH when available.     SARAH notified pt of chair date and time that is pending. According to pt, he can not do 11:15AM, he would rather switch to another location that has a earlier chair time. SARAH waiting on call back from Karon.

## 2018-09-21 NOTE — SUBJECTIVE & OBJECTIVE
Interval History: No issues overnight.    Review of Systems   HENT: Negative for ear discharge and ear pain.    Eyes: Negative for pain and itching.   Endocrine: Negative for polyphagia and polyuria.   Neurological: Negative for seizures and syncope.     Objective:     Vital Signs (Most Recent):  Temp: 98.7 °F (37.1 °C) (09/21/18 1041)  Pulse: 85 (09/21/18 1041)  Resp: 18 (09/21/18 1041)  BP: (!) 142/78 (09/21/18 1041)  SpO2: 99 % (09/21/18 1041) Vital Signs (24h Range):  Temp:  [97.5 °F (36.4 °C)-98.7 °F (37.1 °C)] 98.7 °F (37.1 °C)  Pulse:  [77-85] 85  Resp:  [16-18] 18  SpO2:  [98 %-100 %] 99 %  BP: (122-160)/() 142/78     Weight: 59.4 kg (130 lb 15.3 oz)  Body mass index is 21.14 kg/m².    Intake/Output Summary (Last 24 hours) at 9/21/2018 1621  Last data filed at 9/21/2018 1306  Gross per 24 hour   Intake 1220 ml   Output 2725 ml   Net -1505 ml      Physical Exam   Constitutional: He is oriented to person, place, and time. He appears well-developed and well-nourished.   HENT:   Head: Normocephalic and atraumatic.   Mouth/Throat: Oropharynx is clear and moist.   Eyes: EOM are normal. Pupils are equal, round, and reactive to light.   Neck: Normal range of motion. Neck supple. No JVD present.   Cardiovascular: Normal rate, regular rhythm, normal heart sounds and intact distal pulses.   Pulmonary/Chest: Effort normal and breath sounds normal. No respiratory distress.   Abdominal: Soft. Bowel sounds are normal. He exhibits no distension and no mass. There is no tenderness. There is no rebound and no guarding.   Musculoskeletal: Normal range of motion. He exhibits no edema.   Neurological: He is alert and oriented to person, place, and time.   Skin: Skin is warm and dry. Capillary refill takes less than 2 seconds. He is not diaphoretic.   Psychiatric: He has a normal mood and affect. His behavior is normal.       Significant Labs:   BMP:   Recent Labs   Lab  09/20/18   0430   GLU  108   NA  127*   K  4.5   CL   97   CO2  17*   BUN  118*   CREATININE  10.1*   CALCIUM  8.0*     CBC:   Recent Labs   Lab  09/19/18   2317   WBC  12.86*   HGB  8.7*   HCT  24.0*   PLT  77*

## 2018-09-21 NOTE — PROGRESS NOTES
" [facility] [place]  Adult Nutrition  Progress Note    SUMMARY       Recommendations    Recommendation/Intervention:   1. Rec novasource renal bid   2. Provided renal diet education with handouts and f/u resources   3. RD to monitor     Goals: Meet >85% EEN  Nutrition Goal Status: new  Communication of RD Recs: (plan of care, second sign)    D/C planning: Renal diet with supplements as needed.     Reason for Assessment    Reason for Assessment: length of stay  Diagnosis: (GI hemorrhage)  Relevant Medical History: HTN, HLD, Gout    General Information Comments: Prev met with pt to review basics of Renal diet. Pt has since been started on HD since last visit. Denies issues with n/v/chewing or swallowing. Reports eating well with good appetite (nsg notes indicate 50% of meals). Does not want oral supplements because he feels his appetite is normal; encouraged to try novasource renal to aid with adequate kcal/protein intake.  Pt thin, but reports UBW: 135#. Mild to moderate loss of lean body mass evident in temporals, clavicle; limited fat stores, but adequate. Provided renal diet education with handouts and f/u resources. Reviewed with pt and discussed adequate kcal/protein intake. Encouraged nepro/novasource renal to aid with kcal intake. Pt receptive.     Nutrition Risk Screen    Nutrition Risk Screen: no indicators present    Nutrition/Diet History    Patient Reported Diet/Restrictions/Preferences: (Limits high purine foods)  Food Preferences: Denies  Do you have any cultural, spiritual, Mandaeism conflicts, given your current situation?: none  Food Allergies: NKFA  Factors Affecting Nutritional Intake: None identified at this time    Anthropometrics    Temp: 98.7 °F (37.1 °C)  Height Method: Measured  Height: 5' 6" (167.6 cm)  Height (inches): 66 in  Weight Method: Bed Scale  Weight: 59.4 kg (130 lb 15.3 oz)  Weight (lb): 130.95 lb  Ideal Body Weight (IBW), Male: 142 lb  % Ideal Body Weight, Male (lb): " 92.22 lb  BMI (Calculated): 21.2  BMI Grade: 18.5-24.9 - normal  Weight Loss: unintentional  Usual Body Weight (UBW), k kg(kg)  % Usual Body Weight: 97.58  % Weight Change From Usual Weight: -2.62 %       Lab/Procedures/Meds    Pertinent Labs Reviewed: reviewed  Pertinent Medications Reviewed: reviewed    Physical Findings/Assessment    Overall Physical Appearance: underweight  Oral/Mouth Cavity: WDL  Skin: intact    Estimated/Assessed Needs    Weight Used For Calorie Calculations: 59.4 kg (130 lb 15.3 oz)  Energy Calorie Requirements (kcal): 6517-0702 kcal  Energy Need Method: Kcal/kg  Protein Requirements: 70g (1.2g/kg)  Weight Used For Protein Calculations: 59.4 kg (130 lb 15.3 oz)     Fluid Need Method: RDA Method(or per MD)  RDA Method (mL):     Nutrition Prescription Ordered    Current Diet Order: Renal    Evaluation of Received Nutrient/Fluid Intake    I/O: reviewed  Energy Calories Required: not meeting needs  Protein Required: not meeting needs  Fluid Required: (per MD)  Comments: LBM:   Tolerance: tolerating  % Intake of Estimated Energy Needs: 50 - 75 %  % Meal Intake: 50 %    Nutrition Risk    Level of Risk/Frequency of Follow-up: (1 x week)     Assessment and Plan    Nutrition Problem  Altered nutrition related lab values    Related to (etiology):   Renal Failure    Signs and Symptoms (as evidenced by):   Phos 5.1; New HD    Interventions/Recommendations (treatment strategy):  See recs    Nutrition Diagnosis Status:   New     Monitor and Evaluation    Food and Nutrient Intake: energy intake, food and beverage intake  Food and Nutrient Adminstration: diet order  Knowledge/Beliefs/Attitudes: food and nutrition knowledge/skill  Physical Activity and Function: nutrition-related ADLs and IADLs  Anthropometric Measurements: weight, weight change  Biochemical Data, Medical Tests and Procedures: electrolyte and renal panel  Nutrition-Focused Physical Findings: overall appearance     Nutrition  Follow-Up    RD Follow-up?: Yes

## 2018-09-21 NOTE — PLAN OF CARE
Problem: Patient Care Overview  Goal: Plan of Care Review  Outcome: Ongoing (interventions implemented as appropriate)  Pt free of accidental injury during shift. No s/s of distress noted. Denies pain/discomfort. Able to make needs known. Tolerating diet well. Safety measures maintained. Call bell within reach. Will continue to monitor

## 2018-09-22 LAB
ANION GAP SERPL CALC-SCNC: 11 MMOL/L
BASOPHILS # BLD AUTO: 0.01 K/UL
BASOPHILS NFR BLD: 0.1 %
BUN SERPL-MCNC: 68 MG/DL
CALCIUM SERPL-MCNC: 8.1 MG/DL
CHLORIDE SERPL-SCNC: 106 MMOL/L
CO2 SERPL-SCNC: 21 MMOL/L
CREAT SERPL-MCNC: 7.3 MG/DL
DIFFERENTIAL METHOD: ABNORMAL
EOSINOPHIL # BLD AUTO: 0 K/UL
EOSINOPHIL NFR BLD: 0.3 %
ERYTHROCYTE [DISTWIDTH] IN BLOOD BY AUTOMATED COUNT: 16.5 %
EST. GFR  (AFRICAN AMERICAN): 9 ML/MIN/1.73 M^2
EST. GFR  (NON AFRICAN AMERICAN): 7 ML/MIN/1.73 M^2
GLUCOSE SERPL-MCNC: 86 MG/DL
HCT VFR BLD AUTO: 22.1 %
HGB BLD-MCNC: 7.4 G/DL
LYMPHOCYTES # BLD AUTO: 1.3 K/UL
LYMPHOCYTES NFR BLD: 10.4 %
MCH RBC QN AUTO: 30 PG
MCHC RBC AUTO-ENTMCNC: 33.5 G/DL
MCV RBC AUTO: 90 FL
MONOCYTES # BLD AUTO: 1.1 K/UL
MONOCYTES NFR BLD: 8.7 %
NEUTROPHILS # BLD AUTO: 9.9 K/UL
NEUTROPHILS NFR BLD: 78.8 %
PLATELET # BLD AUTO: 120 K/UL
PMV BLD AUTO: 11.8 FL
POTASSIUM SERPL-SCNC: 4.2 MMOL/L
RBC # BLD AUTO: 2.47 M/UL
SODIUM SERPL-SCNC: 138 MMOL/L
WBC # BLD AUTO: 12.55 K/UL

## 2018-09-22 PROCEDURE — 25000003 PHARM REV CODE 250: Performed by: INTERNAL MEDICINE

## 2018-09-22 PROCEDURE — 80048 BASIC METABOLIC PNL TOTAL CA: CPT

## 2018-09-22 PROCEDURE — 25000003 PHARM REV CODE 250: Performed by: HOSPITALIST

## 2018-09-22 PROCEDURE — 11000001 HC ACUTE MED/SURG PRIVATE ROOM

## 2018-09-22 PROCEDURE — 85025 COMPLETE CBC W/AUTO DIFF WBC: CPT

## 2018-09-22 PROCEDURE — 63600175 PHARM REV CODE 636 W HCPCS: Performed by: HOSPITALIST

## 2018-09-22 RX ADMIN — CALCITRIOL 0.25 MCG: 0.25 CAPSULE, LIQUID FILLED ORAL at 08:09

## 2018-09-22 RX ADMIN — PREDNISONE 30 MG: 5 TABLET ORAL at 08:09

## 2018-09-22 RX ADMIN — AMLODIPINE BESYLATE 10 MG: 5 TABLET ORAL at 08:09

## 2018-09-22 RX ADMIN — ATORVASTATIN CALCIUM 20 MG: 10 TABLET, FILM COATED ORAL at 08:09

## 2018-09-22 NOTE — ASSESSMENT & PLAN NOTE
Appreciate GI input.  Pan colitis per C scope.  Possible UC and started on steroids.  Pathology returned as nonspecific but mentioned inflammatory bowel disease was mentioned as a possibility  H/H stable post transfusion.  Some drop in H/H, but no evidence of bleeding.  Probably related to kidney disease.  Completely asymptomatic and would just monitor for now.  Home once outpatient HD arranged.

## 2018-09-22 NOTE — PROGRESS NOTES
"[facility] Medicine  Progress Note    Patient Name: Duran Mccauley  MRN: [id number]  Patient Class: IP- Inpatient   Admission Date: 9/12/2018  Length of Stay: 10 days  Attending Physician: Celestine Arora MD  Primary Care Provider: Patrick Cook MD        Subjective:     Principal Problem:Gastrointestinal hemorrhage    HPI:  57 yo male with HNT, h/o HBV, chronic gout and HLP presented with c/o dark black stools x 5 days, fatigue and weakness. He went to PCP/NP 2 days ago and was told to take "medication" for stools and prednisone for gout flare. He was set up for outpatient c/scope 10/9.  He was concerned about his decreased appetite, diarrhea and weight loss. He denies abdominal pain, hematemesis, fever, chills, N/V.  He was taking indomethicin and ibuprofen intermittently for gout flares over past year. On admit h/h 7.5/21.3.  Coags normal. BUN/Cr 78/5.8.  Albumin 2.9.  GI consulted. Renal u/s pending. Transfusing one unit blood.     Hospital Course:  Pt admitted with ARF and anemia/diarrhea. Underwent c/scope 9/13. Transfuse one unit with inappropriate response in h/h. Plan to transfuse two units PRBCs today. Biopsy pending +/- steroids and mesalamine. Nephrology consulted, marginal improvement in renal function. Serum uric acid level elevated and urine level normal. No acute flare appreciated.   H/h improved; nephrology counseled on diet for gout; monitoring labs; gout flare to right wrist- started on prednisone   9/16 - pt stool is formed and lab cannot run a c.diff study, precautions discontinued. Cr trended up and likely needs biopsy. Gout flare improved   9/17- renal function worsening, GI placed on prednisone  Worsening renal function.  Nephrology consulted IR for HD catheter placement.  9/20: Catheter placed and patient started on dialysis.    Interval History: Feels great.    Review of Systems   HENT: Negative for ear discharge and ear pain.    Eyes: Negative for pain " and itching.   Endocrine: Negative for polyphagia and polyuria.   Neurological: Negative for seizures and syncope.     Objective:     Vital Signs (Most Recent):  Temp: 98 °F (36.7 °C) (09/22/18 1142)  Pulse: 87 (09/22/18 1142)  Resp: 16 (09/22/18 1142)  BP: (!) 163/84 (09/22/18 1142)  SpO2: 97 % (09/22/18 1142) Vital Signs (24h Range):  Temp:  [97.7 °F (36.5 °C)-98.5 °F (36.9 °C)] 98 °F (36.7 °C)  Pulse:  [83-95] 87  Resp:  [16-18] 16  SpO2:  [97 %-100 %] 97 %  BP: (127-169)/(71-85) 163/84     Weight: 59.4 kg (130 lb 15.3 oz)  Body mass index is 21.14 kg/m².    Intake/Output Summary (Last 24 hours) at 9/22/2018 1255  Last data filed at 9/22/2018 1000  Gross per 24 hour   Intake 480 ml   Output 1500 ml   Net -1020 ml      Physical Exam   Constitutional: He is oriented to person, place, and time. He appears well-developed and well-nourished.   HENT:   Head: Normocephalic and atraumatic.   Mouth/Throat: Oropharynx is clear and moist.   Eyes: EOM are normal. Pupils are equal, round, and reactive to light.   Neck: Normal range of motion. Neck supple. No JVD present.   Cardiovascular: Normal rate, regular rhythm, normal heart sounds and intact distal pulses.   Pulmonary/Chest: Effort normal and breath sounds normal. No respiratory distress.   Abdominal: Soft. Bowel sounds are normal. He exhibits no distension and no mass. There is no tenderness. There is no rebound and no guarding.   Musculoskeletal: Normal range of motion. He exhibits no edema.   Neurological: He is alert and oriented to person, place, and time.   Skin: Skin is warm and dry. Capillary refill takes less than 2 seconds. He is not diaphoretic.   Psychiatric: He has a normal mood and affect. His behavior is normal.       Significant Labs:   BMP:   Recent Labs   Lab  09/22/18   0508   GLU  86   NA  138   K  4.2   CL  106   CO2  21*   BUN  68*   CREATININE  7.3*   CALCIUM  8.1*     CBC:   Recent Labs   Lab  09/22/18   0508   WBC  12.55   HGB  7.4*   HCT  22.1*    PLT  120*         Assessment/Plan:      * Gastrointestinal hemorrhage    Appreciate GI input.  Pan colitis per C scope.  Possible UC and started on steroids.  Pathology returned as nonspecific but mentioned inflammatory bowel disease was mentioned as a possibility  H/H stable post transfusion.  Some drop in H/H, but no evidence of bleeding.  Probably related to kidney disease.  Completely asymptomatic and would just monitor for now.  Home once outpatient HD arranged.            Acute renal failure    10 months ago normal renal function.  Possibly secondary to NSAID use.  Unsure if he developed CKD during this time.  Unable to determine stage.  Worsening Creat.  Patient states he is making good amount of urine, but it's not being calculated.  He started a log.  Nephrology following.  Creat continues to worsen and IR consulted for HD catheter placement.  HD catheter placed and patient started on dialysis.          Malnutrition of moderate degree    BOOST TID          Weight loss, unintentional      See above         Thrombocytopenia    Hold heparin/lovenox for DVT prophylaxis  h/o hepatitis B but treated 2010 - liver disease, chronic anemia  HIV - negative   Ivana/scd for DVT prophylaxis               Acute blood loss anemia    Adequate correction of H/H with transfusion.  As above.        Chronic gout of multiple sites    Uric acid level 13.4  Prednisone for acute flare  No NSAIDS or ibuprofen or indomethicin             Pure hypercholesterolemia    Resume statin         Essential (primary) hypertension    Continue current management.            VTE Risk Mitigation (From admission, onward)        Ordered     heparin (porcine) injection 5,000 Units  As needed (PRN)      09/20/18 1106     IP VTE HIGH RISK PATIENT  Once      09/12/18 2341     Place IVANA hose  Until discontinued      09/12/18 2341     Place sequential compression device  Until discontinued      09/12/18 2341              Celestine Arora MD  Department of  Hospital Medicine [facility]

## 2018-09-22 NOTE — PLAN OF CARE
Problem: Patient Care Overview  Goal: Plan of Care Review  Outcome: Ongoing (interventions implemented as appropriate)   09/22/18 0433   Coping/Psychosocial   Plan Of Care Reviewed With patient   Resting quietly during night. No report of pain or discomfort during night. Ambulatory to bathroom, tolerated diet and drinking po fluids. Family at bedside.    Problem: Gastrointestinal Bleeding (Adult)  Goal: Signs and Symptoms of Listed Potential Problems Will be Absent, Minimized or Managed (Gastrointestinal Bleeding)  Signs and symptoms of listed potential problems will be absent, minimized or managed by discharge/transition of care (reference Gastrointestinal Bleeding (Adult) CPG).  Outcome: Ongoing (interventions implemented as appropriate)   09/22/18 0433   Gastrointestinal Bleeding   Problems Present (GI Bleeding) none   No report of bloody stools or emesis.    Problem: Hemodialysis (Adult)  Goal: Signs and Symptoms of Listed Potential Problems Will be Absent, Minimized or Managed (Hemodialysis)  Signs and symptoms of listed potential problems will be absent, minimized or managed by discharge/transition of care (reference Hemodialysis (Adult) CPG).  Outcome: Ongoing (interventions implemented as appropriate)   09/22/18 0433   Hemodialysis   Problems Present (Hemodialysis) none   Access to right subclavian intact, no complications. Treatment T-T-S.

## 2018-09-22 NOTE — NURSING
Pt free of accidental injury during shift. No s/s of distress noted. Denies pain at present. Able to make needs known. Independent. Pending dialysis session then discharge home

## 2018-09-22 NOTE — SUBJECTIVE & OBJECTIVE
Interval History: Feels great.    Review of Systems   HENT: Negative for ear discharge and ear pain.    Eyes: Negative for pain and itching.   Endocrine: Negative for polyphagia and polyuria.   Neurological: Negative for seizures and syncope.     Objective:     Vital Signs (Most Recent):  Temp: 98 °F (36.7 °C) (09/22/18 1142)  Pulse: 87 (09/22/18 1142)  Resp: 16 (09/22/18 1142)  BP: (!) 163/84 (09/22/18 1142)  SpO2: 97 % (09/22/18 1142) Vital Signs (24h Range):  Temp:  [97.7 °F (36.5 °C)-98.5 °F (36.9 °C)] 98 °F (36.7 °C)  Pulse:  [83-95] 87  Resp:  [16-18] 16  SpO2:  [97 %-100 %] 97 %  BP: (127-169)/(71-85) 163/84     Weight: 59.4 kg (130 lb 15.3 oz)  Body mass index is 21.14 kg/m².    Intake/Output Summary (Last 24 hours) at 9/22/2018 1255  Last data filed at 9/22/2018 1000  Gross per 24 hour   Intake 480 ml   Output 1500 ml   Net -1020 ml      Physical Exam   Constitutional: He is oriented to person, place, and time. He appears well-developed and well-nourished.   HENT:   Head: Normocephalic and atraumatic.   Mouth/Throat: Oropharynx is clear and moist.   Eyes: EOM are normal. Pupils are equal, round, and reactive to light.   Neck: Normal range of motion. Neck supple. No JVD present.   Cardiovascular: Normal rate, regular rhythm, normal heart sounds and intact distal pulses.   Pulmonary/Chest: Effort normal and breath sounds normal. No respiratory distress.   Abdominal: Soft. Bowel sounds are normal. He exhibits no distension and no mass. There is no tenderness. There is no rebound and no guarding.   Musculoskeletal: Normal range of motion. He exhibits no edema.   Neurological: He is alert and oriented to person, place, and time.   Skin: Skin is warm and dry. Capillary refill takes less than 2 seconds. He is not diaphoretic.   Psychiatric: He has a normal mood and affect. His behavior is normal.       Significant Labs:   BMP:   Recent Labs   Lab  09/22/18   0508   GLU  86   NA  138   K  4.2   CL  106   CO2  21*    BUN  68*   CREATININE  7.3*   CALCIUM  8.1*     CBC:   Recent Labs   Lab  09/22/18   0508   WBC  12.55   HGB  7.4*   HCT  22.1*   PLT  120*

## 2018-09-22 NOTE — PROGRESS NOTES
Duran Mccauley is a 58 y.o. male patient.    Follow for DIANE, dialysis    Patient reportedly feeling OK  Comfortable    Scheduled Meds:   amLODIPine  10 mg Oral Daily    atorvastatin  20 mg Oral Daily    calcitRIOL  0.25 mcg Oral Daily    epoetin faviola  10,000 Units Intravenous Every Tues, Thurs, Sat    predniSONE  30 mg Oral Daily       Review of patient's allergies indicates:  No Known Allergies      Vital Signs Range (Last 24H):  Temp:  [97.7 °F (36.5 °C)-98.7 °F (37.1 °C)]   Pulse:  [83-95]   Resp:  [17-18]   BP: (127-169)/(71-85)   SpO2:  [99 %-100 %]     I & O (Last 24H):    Intake/Output Summary (Last 24 hours) at 9/22/2018 0921  Last data filed at 9/22/2018 0600  Gross per 24 hour   Intake 600 ml   Output 1500 ml   Net -900 ml           Physical Exam:  General appearance: well developed, well nourished, no distress  Lungs:  clear to auscultation bilaterally and normal respiratory effort  Heart: regular rate and rhythm  Abdomen: soft, non-tender non-distented; bowel sounds normal; no masses,  no organomegaly  Extremities: no cyanosis or edema, or clubbing    Laboratory:  CBC:   Recent Labs   Lab  09/22/18   0508   WBC  12.55   RBC  2.47*   HGB  7.4*   HCT  22.1*   PLT  120*   MCV  90   MCH  30.0   MCHC  33.5     CMP:   Recent Labs   Lab  09/22/18   0508   GLU  86   CALCIUM  8.1*   NA  138   K  4.2   CO2  21*   CL  106   BUN  68*   CREATININE  7.3*       Imp/Plan    DIANE - etiology unclear, HD dependent  Hyperkalemia - resolved  Ulcerative colitis  HTN  Gout  Anemia of chronic disease    Continue present Rx  HD today  OK for d/c after dialysis if outpatient HD arranged.          Carlin Brown  9/22/2018

## 2018-09-23 PROCEDURE — 90935 HEMODIALYSIS ONE EVALUATION: CPT

## 2018-09-23 PROCEDURE — 25000003 PHARM REV CODE 250: Performed by: HOSPITALIST

## 2018-09-23 PROCEDURE — 63600175 PHARM REV CODE 636 W HCPCS: Performed by: HOSPITALIST

## 2018-09-23 PROCEDURE — 25000003 PHARM REV CODE 250: Performed by: INTERNAL MEDICINE

## 2018-09-23 PROCEDURE — 63600175 PHARM REV CODE 636 W HCPCS: Performed by: INTERNAL MEDICINE

## 2018-09-23 PROCEDURE — 11000001 HC ACUTE MED/SURG PRIVATE ROOM

## 2018-09-23 RX ADMIN — CLONIDINE HYDROCHLORIDE 0.1 MG: 0.1 TABLET ORAL at 02:09

## 2018-09-23 RX ADMIN — HEPARIN SODIUM 5000 UNITS: 5000 INJECTION, SOLUTION INTRAVENOUS; SUBCUTANEOUS at 01:09

## 2018-09-23 RX ADMIN — CALCITRIOL 0.25 MCG: 0.25 CAPSULE, LIQUID FILLED ORAL at 08:09

## 2018-09-23 RX ADMIN — ATORVASTATIN CALCIUM 20 MG: 10 TABLET, FILM COATED ORAL at 08:09

## 2018-09-23 RX ADMIN — PREDNISONE 30 MG: 5 TABLET ORAL at 08:09

## 2018-09-23 RX ADMIN — ERYTHROPOIETIN 10000 UNITS: 10000 INJECTION, SOLUTION INTRAVENOUS; SUBCUTANEOUS at 01:09

## 2018-09-23 RX ADMIN — AMLODIPINE BESYLATE 10 MG: 5 TABLET ORAL at 08:09

## 2018-09-23 NOTE — PLAN OF CARE
Problem: Hemodialysis (Adult)  Goal: Signs and Symptoms of Listed Potential Problems Will be Absent, Minimized or Managed (Hemodialysis)  Signs and symptoms of listed potential problems will be absent, minimized or managed by discharge/transition of care (reference Hemodialysis (Adult) CPG).  Outcome: Ongoing (interventions implemented as appropriate)   09/23/18 0103   Hemodialysis   Problems Assessed (Hemodialysis) all   Problems Present (Hemodialysis) cardiovascular complications;electrolyte imbalance;fluid imbalance;hematologic complications;situational response       Comments: 3.5 hour hd tx in progress as ordered.

## 2018-09-23 NOTE — PROGRESS NOTES
Duran Mccauley is a 58 y.o. male patient.    Follow for DIANE    No new c/o, receiving HD late last night  Comfortable    Scheduled Meds:   amLODIPine  10 mg Oral Daily    atorvastatin  20 mg Oral Daily    calcitRIOL  0.25 mcg Oral Daily    epoetin faviola  10,000 Units Intravenous Every Tues, Thurs, Sat    predniSONE  30 mg Oral Daily       Review of patient's allergies indicates:  No Known Allergies      Vital Signs Range (Last 24H):  Temp:  [97.5 °F (36.4 °C)-98.8 °F (37.1 °C)]   Pulse:  [80-94]   Resp:  [16-18]   BP: (135-190)/()   SpO2:  [97 %-100 %]     I & O (Last 24H):    Intake/Output Summary (Last 24 hours) at 9/23/2018 1012  Last data filed at 9/23/2018 0600  Gross per 24 hour   Intake 800 ml   Output 2603 ml   Net -1803 ml           Physical Exam:  General appearance: well developed, well nourished, no distress  Lungs:  clear to auscultation bilaterally and normal respiratory effort  Heart: regular rate and rhythm  Abdomen: soft, non-tender non-distented; bowel sounds normal; no masses,  no organomegaly  Extremities: no cyanosis or edema, or clubbing    Laboratory:  CBC:   Recent Labs   Lab  09/22/18   0508   WBC  12.55   RBC  2.47*   HGB  7.4*   HCT  22.1*   PLT  120*   MCV  90   MCH  30.0   MCHC  33.5     CMP:   Recent Labs   Lab  09/22/18   0508   GLU  86   CALCIUM  8.1*   NA  138   K  4.2   CO2  21*   CL  106   BUN  68*   CREATININE  7.3*       Imp/Plan    DIANE - received HD last night, u/o has been improving, ? Recovery  HTN  Gout  Ulcerative colitis  Anemia of chronic disease/acute loss    Continue present Rx  CMP in am  Watch for renal recovery      Traedward Brown  9/23/2018

## 2018-09-23 NOTE — SUBJECTIVE & OBJECTIVE
Interval History: Feels great.    Review of Systems   HENT: Negative for ear discharge and ear pain.    Eyes: Negative for pain and itching.   Endocrine: Negative for polyphagia and polyuria.   Neurological: Negative for seizures and syncope.     Objective:     Vital Signs (Most Recent):  Temp: 98.4 °F (36.9 °C) (09/23/18 1136)  Pulse: 86 (09/23/18 1136)  Resp: 17 (09/23/18 1136)  BP: (!) 141/81 (09/23/18 1136)  SpO2: 100 % (09/23/18 1136) Vital Signs (24h Range):  Temp:  [97.5 °F (36.4 °C)-98.8 °F (37.1 °C)] 98.4 °F (36.9 °C)  Pulse:  [80-94] 86  Resp:  [17-18] 17  SpO2:  [99 %-100 %] 100 %  BP: (135-190)/() 141/81     Weight: 59.4 kg (130 lb 15.3 oz)  Body mass index is 21.14 kg/m².    Intake/Output Summary (Last 24 hours) at 9/23/2018 1416  Last data filed at 9/23/2018 0600  Gross per 24 hour   Intake 800 ml   Output 2603 ml   Net -1803 ml      Physical Exam   Constitutional: He is oriented to person, place, and time. He appears well-developed and well-nourished.   HENT:   Head: Normocephalic and atraumatic.   Mouth/Throat: Oropharynx is clear and moist.   Eyes: EOM are normal. Pupils are equal, round, and reactive to light.   Neck: Normal range of motion. Neck supple. No JVD present.   Cardiovascular: Normal rate, regular rhythm, normal heart sounds and intact distal pulses.   Pulmonary/Chest: Effort normal and breath sounds normal. No respiratory distress.   Abdominal: Soft. Bowel sounds are normal. He exhibits no distension and no mass. There is no tenderness. There is no rebound and no guarding.   Musculoskeletal: Normal range of motion. He exhibits no edema.   Neurological: He is alert and oriented to person, place, and time.   Skin: Skin is warm and dry. Capillary refill takes less than 2 seconds. He is not diaphoretic.   Psychiatric: He has a normal mood and affect. His behavior is normal.       Significant Labs:   BMP:   Recent Labs   Lab  09/22/18   0508   GLU  86   NA  138   K  4.2   CL  106   CO2   21*   BUN  68*   CREATININE  7.3*   CALCIUM  8.1*     CBC:   Recent Labs   Lab  09/22/18   0508   WBC  12.55   HGB  7.4*   HCT  22.1*   PLT  120*

## 2018-09-23 NOTE — PLAN OF CARE
Problem: Patient Care Overview  Goal: Plan of Care Review  Outcome: Ongoing (interventions implemented as appropriate)   09/23/18 0313   Coping/Psychosocial   Plan Of Care Reviewed With patient   Resting quietly at this time, no report of pain or discomfort. Medicated for elevated bp post dialysis tx as ordered. Tolerated diet and drinking po fluid, ambulatory to restroom without assist.    Problem: Gastrointestinal Bleeding (Adult)  Goal: Signs and Symptoms of Listed Potential Problems Will be Absent, Minimized or Managed (Gastrointestinal Bleeding)  Signs and symptoms of listed potential problems will be absent, minimized or managed by discharge/transition of care (reference Gastrointestinal Bleeding (Adult) CPG).   09/23/18 0313   Gastrointestinal Bleeding   Problems Present (GI Bleeding) none   No report of bloody stool or emesis.    Problem: Hemodialysis (Adult)  Goal: Signs and Symptoms of Listed Potential Problems Will be Absent, Minimized or Managed (Hemodialysis)  Signs and symptoms of listed potential problems will be absent, minimized or managed by discharge/transition of care (reference Hemodialysis (Adult) CPG).  Outcome: Ongoing (interventions implemented as appropriate)   09/23/18 0313   Hemodialysis   Problems Assessed (Hemodialysis) situational response   Treatment for 3.5 hours during night, access with no complications.

## 2018-09-23 NOTE — PROGRESS NOTES
"[facility] Medicine  Progress Note    Patient Name: Duran Mccauley  MRN: [id number]  Patient Class: IP- Inpatient   Admission Date: 9/12/2018  Length of Stay: 11 days  Attending Physician: Celestine Arora MD  Primary Care Provider: Patrick Cook MD        Subjective:     Principal Problem:Gastrointestinal hemorrhage    HPI:  57 yo male with HNT, h/o HBV, chronic gout and HLP presented with c/o dark black stools x 5 days, fatigue and weakness. He went to PCP/NP 2 days ago and was told to take "medication" for stools and prednisone for gout flare. He was set up for outpatient c/scope 10/9.  He was concerned about his decreased appetite, diarrhea and weight loss. He denies abdominal pain, hematemesis, fever, chills, N/V.  He was taking indomethicin and ibuprofen intermittently for gout flares over past year. On admit h/h 7.5/21.3.  Coags normal. BUN/Cr 78/5.8.  Albumin 2.9.  GI consulted. Renal u/s pending. Transfusing one unit blood.     Hospital Course:  Pt admitted with ARF and anemia/diarrhea. Underwent c/scope 9/13. Transfuse one unit with inappropriate response in h/h. Plan to transfuse two units PRBCs today. Biopsy pending +/- steroids and mesalamine. Nephrology consulted, marginal improvement in renal function. Serum uric acid level elevated and urine level normal. No acute flare appreciated.   H/h improved; nephrology counseled on diet for gout; monitoring labs; gout flare to right wrist- started on prednisone   9/16 - pt stool is formed and lab cannot run a c.diff study, precautions discontinued. Cr trended up and likely needs biopsy. Gout flare improved   9/17- renal function worsening, GI placed on prednisone  Worsening renal function.  Nephrology consulted IR for HD catheter placement.  9/20: Catheter placed and patient started on dialysis.    Interval History: Feels great.    Review of Systems   HENT: Negative for ear discharge and ear pain.    Eyes: Negative for pain " and itching.   Endocrine: Negative for polyphagia and polyuria.   Neurological: Negative for seizures and syncope.     Objective:     Vital Signs (Most Recent):  Temp: 98.4 °F (36.9 °C) (09/23/18 1136)  Pulse: 86 (09/23/18 1136)  Resp: 17 (09/23/18 1136)  BP: (!) 141/81 (09/23/18 1136)  SpO2: 100 % (09/23/18 1136) Vital Signs (24h Range):  Temp:  [97.5 °F (36.4 °C)-98.8 °F (37.1 °C)] 98.4 °F (36.9 °C)  Pulse:  [80-94] 86  Resp:  [17-18] 17  SpO2:  [99 %-100 %] 100 %  BP: (135-190)/() 141/81     Weight: 59.4 kg (130 lb 15.3 oz)  Body mass index is 21.14 kg/m².    Intake/Output Summary (Last 24 hours) at 9/23/2018 1416  Last data filed at 9/23/2018 0600  Gross per 24 hour   Intake 800 ml   Output 2603 ml   Net -1803 ml      Physical Exam   Constitutional: He is oriented to person, place, and time. He appears well-developed and well-nourished.   HENT:   Head: Normocephalic and atraumatic.   Mouth/Throat: Oropharynx is clear and moist.   Eyes: EOM are normal. Pupils are equal, round, and reactive to light.   Neck: Normal range of motion. Neck supple. No JVD present.   Cardiovascular: Normal rate, regular rhythm, normal heart sounds and intact distal pulses.   Pulmonary/Chest: Effort normal and breath sounds normal. No respiratory distress.   Abdominal: Soft. Bowel sounds are normal. He exhibits no distension and no mass. There is no tenderness. There is no rebound and no guarding.   Musculoskeletal: Normal range of motion. He exhibits no edema.   Neurological: He is alert and oriented to person, place, and time.   Skin: Skin is warm and dry. Capillary refill takes less than 2 seconds. He is not diaphoretic.   Psychiatric: He has a normal mood and affect. His behavior is normal.       Significant Labs:   BMP:   Recent Labs   Lab  09/22/18   0508   GLU  86   NA  138   K  4.2   CL  106   CO2  21*   BUN  68*   CREATININE  7.3*   CALCIUM  8.1*     CBC:   Recent Labs   Lab  09/22/18   0508   WBC  12.55   HGB  7.4*   HCT   22.1*   PLT  120*         Assessment/Plan:      * Gastrointestinal hemorrhage    Appreciate GI input.  Pan colitis per C scope.  Possible UC and started on steroids.  Pathology returned as nonspecific but mentioned inflammatory bowel disease was mentioned as a possibility  H/H stable post transfusion.  Some drop in H/H, but no evidence of bleeding.  Probably related to kidney disease.  Completely asymptomatic and would just monitor for now.  Home once outpatient HD arranged.            Acute renal failure    10 months ago normal renal function.  Possibly secondary to NSAID use.  Unsure if he developed CKD during this time.  Unable to determine stage.  Worsening Creat.  Patient states he is making good amount of urine, but it's not being calculated.  He started a log.  Nephrology following.  Creat continues to worsen and IR consulted for HD catheter placement.  HD catheter placed and patient started on dialysis.          Malnutrition of moderate degree    BOOST TID          Weight loss, unintentional      See above         Thrombocytopenia    Hold heparin/lovenox for DVT prophylaxis  h/o hepatitis B but treated 2010 - liver disease, chronic anemia  HIV - negative   Ivana/scd for DVT prophylaxis               Acute blood loss anemia    Adequate correction of H/H with transfusion.  As above.        Chronic gout of multiple sites    Uric acid level 13.4  Prednisone for acute flare  No NSAIDS or ibuprofen or indomethicin             Pure hypercholesterolemia    Resume statin         Essential (primary) hypertension    Continue current management.            VTE Risk Mitigation (From admission, onward)        Ordered     heparin (porcine) injection 5,000 Units  As needed (PRN)      09/20/18 1106     IP VTE HIGH RISK PATIENT  Once      09/12/18 2341     Place IVANA hose  Until discontinued      09/12/18 2341     Place sequential compression device  Until discontinued      09/12/18 2341              Celestine Arora,  MD  Department of Hospital Medicine   [facility]

## 2018-09-24 VITALS
BODY MASS INDEX: 21.04 KG/M2 | HEART RATE: 97 BPM | DIASTOLIC BLOOD PRESSURE: 86 MMHG | RESPIRATION RATE: 18 BRPM | SYSTOLIC BLOOD PRESSURE: 166 MMHG | TEMPERATURE: 99 F | OXYGEN SATURATION: 100 % | HEIGHT: 66 IN | WEIGHT: 130.94 LBS

## 2018-09-24 PROBLEM — D62 ACUTE BLOOD LOSS ANEMIA: Status: RESOLVED | Noted: 2018-09-13 | Resolved: 2018-09-24

## 2018-09-24 PROBLEM — K92.2 GASTROINTESTINAL HEMORRHAGE: Status: RESOLVED | Noted: 2018-09-12 | Resolved: 2018-09-24

## 2018-09-24 LAB
ALBUMIN SERPL BCP-MCNC: 2.4 G/DL
ALBUMIN SERPL ELPH-MCNC: 2.86 G/DL
ALP SERPL-CCNC: 60 U/L
ALPHA1 GLOB SERPL ELPH-MCNC: 0.41 G/DL
ALPHA2 GLOB SERPL ELPH-MCNC: 0.51 G/DL
ALT SERPL W/O P-5'-P-CCNC: 21 U/L
ANION GAP SERPL CALC-SCNC: 7 MMOL/L
AST SERPL-CCNC: 26 U/L
B-GLOBULIN SERPL ELPH-MCNC: 0.58 G/DL
BILIRUB SERPL-MCNC: 0.9 MG/DL
BUN SERPL-MCNC: 43 MG/DL
CALCIUM SERPL-MCNC: 7.9 MG/DL
CHLORIDE SERPL-SCNC: 108 MMOL/L
CO2 SERPL-SCNC: 23 MMOL/L
CREAT SERPL-MCNC: 6 MG/DL
EST. GFR  (AFRICAN AMERICAN): 11 ML/MIN/1.73 M^2
EST. GFR  (NON AFRICAN AMERICAN): 9 ML/MIN/1.73 M^2
GAMMA GLOB SERPL ELPH-MCNC: 0.94 G/DL
GLUCOSE SERPL-MCNC: 82 MG/DL
HBV DNA SERPL QL NAA+PROBE: NOT DETECTED
POTASSIUM SERPL-SCNC: 4.4 MMOL/L
PROT SERPL-MCNC: 5.1 G/DL
PROT SERPL-MCNC: 5.3 G/DL
SODIUM SERPL-SCNC: 138 MMOL/L
URATE SERPL-MCNC: 6.4 MG/DL

## 2018-09-24 PROCEDURE — 25000003 PHARM REV CODE 250: Performed by: INTERNAL MEDICINE

## 2018-09-24 PROCEDURE — 63600175 PHARM REV CODE 636 W HCPCS: Performed by: INTERNAL MEDICINE

## 2018-09-24 PROCEDURE — 80100016 HC MAINTENANCE HEMODIALYSIS

## 2018-09-24 PROCEDURE — 80053 COMPREHEN METABOLIC PANEL: CPT

## 2018-09-24 PROCEDURE — 84550 ASSAY OF BLOOD/URIC ACID: CPT

## 2018-09-24 PROCEDURE — 63600175 PHARM REV CODE 636 W HCPCS: Performed by: HOSPITALIST

## 2018-09-24 PROCEDURE — 25000003 PHARM REV CODE 250: Performed by: HOSPITALIST

## 2018-09-24 PROCEDURE — 36415 COLL VENOUS BLD VENIPUNCTURE: CPT

## 2018-09-24 RX ORDER — AMLODIPINE BESYLATE 10 MG/1
10 TABLET ORAL DAILY
Qty: 30 TABLET | Refills: 11 | Status: SHIPPED | OUTPATIENT
Start: 2018-09-25 | End: 2019-04-11

## 2018-09-24 RX ORDER — PREDNISONE 10 MG/1
TABLET ORAL
Qty: 18 TABLET | Refills: 0 | Status: SHIPPED | OUTPATIENT
Start: 2018-09-24 | End: 2018-10-19 | Stop reason: ALTCHOICE

## 2018-09-24 RX ORDER — ALLOPURINOL 100 MG/1
200 TABLET ORAL DAILY
Status: DISCONTINUED | OUTPATIENT
Start: 2018-09-24 | End: 2018-09-24

## 2018-09-24 RX ORDER — CALCITRIOL 0.25 UG/1
0.25 CAPSULE ORAL DAILY
Qty: 30 CAPSULE | Refills: 11 | Status: SHIPPED | OUTPATIENT
Start: 2018-09-25 | End: 2018-10-19 | Stop reason: ALTCHOICE

## 2018-09-24 RX ADMIN — ATORVASTATIN CALCIUM 20 MG: 10 TABLET, FILM COATED ORAL at 08:09

## 2018-09-24 RX ADMIN — PREDNISONE 30 MG: 5 TABLET ORAL at 08:09

## 2018-09-24 RX ADMIN — ERYTHROPOIETIN 10000 UNITS: 10000 INJECTION, SOLUTION INTRAVENOUS; SUBCUTANEOUS at 01:09

## 2018-09-24 RX ADMIN — AMLODIPINE BESYLATE 10 MG: 5 TABLET ORAL at 08:09

## 2018-09-24 RX ADMIN — CALCITRIOL 0.25 MCG: 0.25 CAPSULE, LIQUID FILLED ORAL at 08:09

## 2018-09-24 RX ADMIN — HEPARIN SODIUM 5000 UNITS: 5000 INJECTION, SOLUTION INTRAVENOUS; SUBCUTANEOUS at 01:09

## 2018-09-24 NOTE — PROGRESS NOTES
IV d/c w/tip intact 2x2 & tape applied, pt given discharge f/u & prescription info, pt verbalized understanding and all questions addressed, pt getting dressed

## 2018-09-24 NOTE — PROGRESS NOTES
WRITTEN HEALTHCARE DISCHARGE INFORMATION     Things that YOU are RESPONSIBLE for to Manage Your Care At Home:    1. Getting your prescriptions filled.  2. Taking you medications as directed. DO NOT MISS ANY DOSES!  3. Going to your follow-up doctor appointments. This is important because it allows the doctor to monitor your progress and to determine if any changes need to be made to your treatment plan.    If you are unable to make your follow up appointments, please call the number listed and reschedule this appointment.     ____________HELP AT HOME____________________    Experiencing any SIGNS or SYMPTOMS: YOU CAN    Schedule a same day appopintment with your Primary Care Doctor or  you can call [facility] On Call Nurse Care Line for 24/7 assistance at [phone]    If you are experience any signs or symptoms that have become severe, Call 911 and come to your nearest Emergency Room.    Thank you for choosing [facility] and allowing us to care for you.   From your care management team: Eileen LEUNG [facility] [phone]    You should receive a call from [facility] within 48-72 hours to help manage your care after discharge. Please try to make sure that you answer your phone for this important phone call.  Follow-up Information     Patrick Cook MD On 9/25/2018.    Specialty:  Internal Medicine  Why:  Outpatient Services, PCP follow-up appointment. Patient should arrive by 1:40PM.   Contact information:  [address] [facility] [facility] [place] [address]  [phone]             Angel Smith Ii, MD. Go on 9/26/2018.    Specialty:  Gastroenterology  Why:  Outpatient Services, GI Follow-up Appointment, Please arrive to clinic for 1:15PM  Contact information:  [address] [place] [address]  [address]  [facility]  [facility] [address]  [phone]             [facility] On 9/26/2018.    Why:  MWF @ 6:00AM  Contact information:  [address] [facility]  [place] [address]

## 2018-09-24 NOTE — PLAN OF CARE
Problem: Patient Care Overview  Goal: Plan of Care Review  Outcome: Ongoing (interventions implemented as appropriate)   09/24/18 0402   Coping/Psychosocial   Plan Of Care Reviewed With patient   Resting quietly at intervals, no report of pain or discomfort. No report of bloody stools or emesis. Tolerating diet and drinking po fluids.     Problem: Hemodialysis (Adult)  Goal: Signs and Symptoms of Listed Potential Problems Will be Absent, Minimized or Managed (Hemodialysis)  Signs and symptoms of listed potential problems will be absent, minimized or managed by discharge/transition of care (reference Hemodialysis (Adult) CPG).  Outcome: Ongoing (interventions implemented as appropriate)   09/24/18 0402   Hemodialysis   Problems Present (Hemodialysis) none   Dialysis access to right subclavian, dsg intact no complications.

## 2018-09-24 NOTE — PLAN OF CARE
"   09/24/18 0932   Final Note   Assessment Type Discharge Planning Assessment   Discharge Disposition Home   What phone number can be called within the next 1-3 days to see how you are doing after discharge? [phone]   Hospital Follow Up  Appt(s) scheduled? Yes   Discharge plans and expectations educations in teach back method with documentation complete? Yes   Right Care Referral Info   Post Acute Recommendation No Care     SARAH contacted Srikanth @ [phone] to explain, pt is currently in the hospital, has discharge orders and his first day start date will be on Wednesday 9/26. SARAH spoke with Darby, and confirmed pt start date will be Wednesday as pt is currently in the hospital.     SW to dialysis room to discuss discharge plans.   EDUCATION:  Pt provided with educational information on DIANE.  Information reviewed and placed in :My Healthcare Packet" to be brought home for  use as resource after discharge.  Information included:  signs and symptoms to look for at discharge. SW instructed pt to call the doctor if experiencing symptoms that may indicate a medical emergency: Reminded pt things he will be responsible for to manage his healthcare at home: getting Rx filled, attending follow up appointments, and taking medication as prescribed were discussed.   Teach back method used.  All questions answered.  Patient verbalized understanding of all information.      SARAH provided pt with a copy of follow-up appointments. SW explained/highlighted date, time, and location of each appointment. SARAH provided pt with a blue folder and instructed pt to place all medical documents in blue folder. SARAH explained to pt the nurse will provide an AVS with diagnosis and instructed pt to place in blue folder and bring to follow-up appointment. SARAH provided pt with a copy of follow-up appointments. SARAH explained/highlighted date, time, and location of each appointment. SARAH provided pt with a blue folder and instructed pt to place all medical " documents in blue folder. SW explained to pt the nurse will provide an AVS with diagnosis and instructed pt to place in blue folder and bring to follow-up appointment. SW notified pt nurse, David, all CM needs have been met.

## 2018-09-24 NOTE — DISCHARGE SUMMARY
"[facility] Medicine  Discharge Summary      Patient Name: Duran Mccauley  MRN: [id number]  Admission Date: 9/12/2018  Hospital Length of Stay: 12 days  Discharge Date and Time:  09/24/2018 9:27 AM  Attending Physician: Celestine Arora MD   Discharging Provider: Celestine Arora MD  Primary Care Provider: Patrick Cook MD      HPI:   57 yo male with HNT, h/o HBV, chronic gout and HLP presented with c/o dark black stools x 5 days, fatigue and weakness. He went to PCP/NP 2 days ago and was told to take "medication" for stools and prednisone for gout flare. He was set up for outpatient c/scope 10/9.  He was concerned about his decreased appetite, diarrhea and weight loss. He denies abdominal pain, hematemesis, fever, chills, N/V.  He was taking indomethicin and ibuprofen intermittently for gout flares over past year. On admit h/h 7.5/21.3.  Coags normal. BUN/Cr 78/5.8.  Albumin 2.9.  GI consulted. Renal u/s pending. Transfusing one unit blood.     Procedure(s) (LRB):  COLONOSCOPY (N/A)      Hospital Course:   59 y/o male with gout presented with diarrhea and black stools.  Patient was noted to be anemic and with renal failure.  Transfused 3 units of blood with adequate correction of H/H.  GI consulted and patient underwent C scope with biopsies.  C scope noted pan colitis.  Biopsy pathology returned as inconclusive, but mentioned inflammatory bowel disease in differential.  Patient was started on Prednisone.  He has remained on Prednisone and will continue with a slow taper.  Patient to follow up with GI.  He has remained afebrile, mostly asymptomatic and hemodynamically stable.  Patient's Creat continued to worsen and Nephrology consulted.  Recommended kidney biopsy, but patient has thrombocytopenic.  Renal failure thought be secondary to patient being on Indocin for past year to treat his gout.  No improvement in renal failure and Nephrology recommending starting dialysis.  IR " consulted and HD catheter placed.  Patient was started on dialysis with no complications.  H/H has dropped, but no evidence of bleeding.  Anemia probably secondary to renal failure.  Patient is completely asymptomatic and would not transfuse at this time.  Patient was started on Epo with dialysis.  SW consulted to arrange outpatient HD.  Patient will be discharged once this is arranged.  He will continue Prednisone taper.  He will follow up with PCP and GI.  He will continue dialysis as outpatient once arranged.     Consults:   Consults (From admission, onward)        Status Ordering Provider     Inpatient consult to Gastroenterology  Once     Provider:  Evgeny Powell MD    Completed CONSUELO GORE     Inpatient consult to Interventional Radiology  Once     Provider:  Stuart Maynard MD    Completed JOSELIN RICKS     Inpatient consult to Nephrology  Once     Provider:  Carlin Brown MD    Acknowledged CONSUELO GORE     Inpatient consult to Nephrology  Once     Provider:  Carlin Brown MD    Completed CONSUELO GORE     Inpatient consult to Registered Dietitian/Nutritionist  Once     Provider:  (Not yet assigned)    Completed JOSELIN RICKS          No new Assessment & Plan notes have been filed under this hospital service since the last note was generated.  Service: Hospital Medicine    Final Active Diagnoses:    Diagnosis Date Noted POA    Acute renal failure [N17.9] 09/12/2018 Yes    Chronic gout of multiple sites [M1A.09X0] 09/13/2018 Yes    Thrombocytopenia [D69.6] 09/13/2018 Yes    Weight loss, unintentional [R63.4] 09/13/2018 Yes    Malnutrition of moderate degree [E44.0] 09/13/2018 Yes    Pure hypercholesterolemia [E78.00] 12/08/2015 Yes     Chronic    Essential (primary) hypertension [I10] 12/04/2015 Yes     Chronic      Problems Resolved During this Admission:    Diagnosis Date Noted Date Resolved POA    PRINCIPAL PROBLEM:  Gastrointestinal hemorrhage [K92.2]  09/12/2018 09/24/2018 Yes    Acute blood loss anemia [D62] 09/13/2018 09/24/2018 Yes       Discharged Condition: stable    Disposition: Home or Self Care    Follow Up:  Follow-up Information     Patrick Cook MD On 9/25/2018.    Specialty:  Internal Medicine  Why:  Outpatient Services, PCP follow-up appointment. Patient should arrive by 1:40PM.   Contact information:  [address] [facility] [address]  [phone]             Angel Smith Ii, MD. Go on 9/26/2018.    Specialty:  Gastroenterology  Why:  Outpatient Services, GI Follow-up Appointment, Please arrive to clinic for 1:15PM  Contact information:  [address] [place] [address]  [address]  [facility]  [facility] [address] [phone]                 Patient Instructions:      Diet renal     Notify your health care provider if you experience any of the following:  temperature >100.4     Notify your health care provider if you experience any of the following:  persistent nausea and vomiting or diarrhea     Notify your health care provider if you experience any of the following:  difficulty breathing or increased cough     Notify your health care provider if you experience any of the following:  persistent dizziness, light-headedness, or visual disturbances     Notify your health care provider if you experience any of the following:  increased confusion or weakness     Activity as tolerated         Pending Diagnostic Studies:     Procedure Component Value Units Date/Time    Cryoglobulin [[id number]] Collected:  09/16/18 0553    Order Status:  Sent Lab Status:  In process Updated:  09/16/18 0606    Specimen:  Blood     Hepatitis B Viral DNA by PCR, Qualitative [[id number]] Collected:  09/20/18 0430    Order Status:  Sent Lab Status:  In process Updated:  09/20/18 0516    Specimen:  Blood     IBD Serology 7 ([facility]) [[id number]] Collected:  09/20/18 0430    Order Status:  Sent Lab Status:  In process Updated:  09/20/18 0805     Specimen:  Blood          Medications:  Reconciled Home Medications:      Medication List      START taking these medications    amLODIPine 10 MG tablet  Commonly known as:  NORVASC  Take 1 tablet (10 mg total) by mouth once daily.     calcitRIOL 0.25 MCG Cap  Commonly known as:  ROCALTROL  Take 1 capsule (0.25 mcg total) by mouth once daily.     polyethylene glycol 240-22.72-6.72 -5.84 gram Solr  Commonly known as:  COLYTE  Take 4,000 mLs (4 L total) by mouth as needed.        CHANGE how you take these medications    atorvastatin 20 MG tablet  Commonly known as:  LIPITOR  Take 1 tablet (20 mg total) by mouth once daily.  What changed:  Another medication with the same name was removed. Continue taking this medication, and follow the directions you see here.     predniSONE 10 MG tablet  Commonly known as:  DELTASONE  Take 2 tabs daily for 5 days, then 1 tab daily for 5 days, then 1/2 tab daily for 6 days then stop.  What changed:  additional instructions        CONTINUE taking these medications    lidocaine 5 %  Commonly known as:  LIDODERM  Place 1 patch onto the skin once daily. Remove & Discard patch within 12 hours or as directed by MD        STOP taking these medications    hydroCHLOROthiazide 12.5 MG Tab  Commonly known as:  HYDRODIURIL     ibuprofen 800 MG tablet  Commonly known as:  ADVIL,MOTRIN     indomethacin 50 MG capsule  Commonly known as:  INDOCIN     methylPREDNISolone 4 mg tablet  Commonly known as:  MEDROL DOSEPACK     oxyCODONE-acetaminophen 5-325 mg per tablet  Commonly known as:  PERCOCET            Indwelling Lines/Drains at time of discharge:   Lines/Drains/Airways     Central Venous Catheter Line                 Tunneled Central Line Insertion/Assessment - Double Lumen  09/19/18 1500 right internal jugular 4 days          Airway                 Airway - Non-Surgical 09/14/18 0928 Nasal Cannula 9 days                Time spent on the discharge of patient: >30 minutes  Patient was seen and  examined on the date of discharge and determined to be suitable for discharge.         Celestine Arora MD  Department of Hospital Medicine  [facility]

## 2018-09-24 NOTE — PROGRESS NOTES
Duran Mccauley is a 58 y.o. male patient.    Follow for DIANE, dialysis    No new c/o, comfortable    Scheduled Meds:   amLODIPine  10 mg Oral Daily    atorvastatin  20 mg Oral Daily    calcitRIOL  0.25 mcg Oral Daily    epoetin faviola  10,000 Units Intravenous Every Tues, Thurs, Sat    predniSONE  30 mg Oral Daily       Review of patient's allergies indicates:  No Known Allergies      Vital Signs Range (Last 24H):  Temp:  [98.1 °F (36.7 °C)-98.7 °F (37.1 °C)]   Pulse:  [83-91]   Resp:  [17-18]   BP: (132-158)/(68-86)   SpO2:  [98 %-100 %]     I & O (Last 24H):    Intake/Output Summary (Last 24 hours) at 9/24/2018 0857  Last data filed at 9/24/2018 0500  Gross per 24 hour   Intake 720 ml   Output 300 ml   Net 420 ml           Physical Exam:  General appearance: well developed, well nourished, no distress  Lungs:  clear to auscultation bilaterally and normal respiratory effort  Heart: regular rate and rhythm  Abdomen: soft, non-tender non-distented; bowel sounds normal; no masses,  no organomegaly  Extremities: no cyanosis or edema, or clubbing    Laboratory:  CBC:   Recent Labs   Lab  09/22/18   0508   WBC  12.55   RBC  2.47*   HGB  7.4*   HCT  22.1*   PLT  120*   MCV  90   MCH  30.0   MCHC  33.5     CMP:   Recent Labs   Lab  09/24/18   0500   GLU  82   CALCIUM  7.9*   ALBUMIN  2.4*   PROT  5.1*   NA  138   K  4.4   CO2  23   CL  108   BUN  43*   CREATININE  6.0*   ALKPHOS  60   ALT  21   AST  26   BILITOT  0.9       Imp/Plan    DIANE - HD dependent, dialysis again today  HTN  Ulcerative colitis  Gout - uric acid level WNL  Anemia of chronic disease    Continue HD qMWF  We'll follow for dialysis     Addendum - patient seen while on dialysis  Stable, tolerated well  As above    Carlin Brown  9/24/2018

## 2018-09-25 ENCOUNTER — OFFICE VISIT (OUTPATIENT)
Dept: FAMILY MEDICINE | Facility: CLINIC | Age: 58
End: 2018-09-25
Payer: COMMERCIAL

## 2018-09-25 VITALS
HEIGHT: 66 IN | WEIGHT: 122 LBS | BODY MASS INDEX: 19.61 KG/M2 | TEMPERATURE: 99 F | OXYGEN SATURATION: 97 % | SYSTOLIC BLOOD PRESSURE: 130 MMHG | HEART RATE: 106 BPM | DIASTOLIC BLOOD PRESSURE: 80 MMHG

## 2018-09-25 DIAGNOSIS — D64.9 ANEMIA, UNSPECIFIED TYPE: ICD-10-CM

## 2018-09-25 DIAGNOSIS — Z09 HOSPITAL DISCHARGE FOLLOW-UP: Primary | ICD-10-CM

## 2018-09-25 DIAGNOSIS — Z12.5 SCREENING PSA (PROSTATE SPECIFIC ANTIGEN): ICD-10-CM

## 2018-09-25 DIAGNOSIS — D69.6 THROMBOCYTOPENIA: ICD-10-CM

## 2018-09-25 DIAGNOSIS — M1A.09X0 IDIOPATHIC CHRONIC GOUT OF MULTIPLE SITES WITHOUT TOPHUS: ICD-10-CM

## 2018-09-25 DIAGNOSIS — I10 ESSENTIAL (PRIMARY) HYPERTENSION: Chronic | ICD-10-CM

## 2018-09-25 DIAGNOSIS — N17.9 ACUTE RENAL FAILURE, UNSPECIFIED ACUTE RENAL FAILURE TYPE: ICD-10-CM

## 2018-09-25 LAB
CRYOGLOB SER QL: NORMAL
IBD SEROLOGY 7: NORMAL
PATHOLOGIST INTERPRETATION SPE: NORMAL

## 2018-09-25 PROCEDURE — 99214 OFFICE O/P EST MOD 30 MIN: CPT | Mod: S$GLB,,, | Performed by: INTERNAL MEDICINE

## 2018-09-25 PROCEDURE — 99999 PR PBB SHADOW E&M-EST. PATIENT-LVL III: CPT | Mod: PBBFAC,,, | Performed by: INTERNAL MEDICINE

## 2018-09-25 NOTE — PROGRESS NOTES
Transitional Care Note  Subjective:       Patient ID: Duran Mccauley is a 58 y.o. male.  Chief Complaint: Hospital Follow Up    Family and/or Caretaker present at visit?  No.  Diagnostic tests reviewed/disposition: No diagnosic tests pending after this hospitalization.  Disease/illness education: ARF on HD, GI bleed, thrombocytopenia, HTN HLD  Home health/community services discussion/referrals: Patient does not have home health established from hospital visit.  They do not need home health.  If needed, we will set up home health for the patient.   Establishment or re-establishment of referral orders for community resources: No other necessary community resources.   Discussion with other health care providers: No discussion with other health care providers necessary.   Duran Mccauley is a 58 y.o. male that is well known to me.  I last saw him Nov 2017.  He has been seen several times this year by one of my partners.  He presents today for hospital follow up.      Hospital Course:   57 y/o male with gout presented with diarrhea and black stools.  Patient was noted to be anemic and with renal failure.  Transfused 3 units of blood with adequate correction of H/H.  GI consulted and patient underwent C scope with biopsies.  C scope noted pan colitis.  Biopsy pathology returned as inconclusive, but mentioned inflammatory bowel disease in differential.  Patient was started on Prednisone.  He has remained on Prednisone and will continue with a slow taper.  Patient to follow up with GI.  He has remained afebrile, mostly asymptomatic and hemodynamically stable.  Patient's Creat continued to worsen and Nephrology consulted.  Recommended kidney biopsy, but patient has thrombocytopenic.  Renal failure thought be secondary to patient being on Indocin for past year to treat his gout.  No improvement in renal failure and Nephrology recommending starting dialysis.  IR consulted and HD catheter placed.  Patient was started on  dialysis with no complications.  H/H has dropped, but no evidence of bleeding.  Anemia probably secondary to renal failure.  Patient is completely asymptomatic and would not transfuse at this time.  Patient was started on Epo with dialysis.  SW consulted to arrange outpatient HD.  Patient will be discharged once this is arranged.  He will continue Prednisone taper.  He will follow up with PCP and GI.  He will continue dialysis as outpatient once arranged.        Currently:    He is going to HD MWF.  Has not seen nephrology since discharge.  DC summary suggests that it will be with Dr. Brown.  He also was given follow up with [facility] for his colonoscopy findings.  He is feeling well.  No F/C/NS, CP, palpitations, N/V/C/D/BRBPR/melena.  He is following a renal diet.        Review of Systems   Constitutional: Negative for chills, fever and unexpected weight change.   HENT: Negative for congestion, dental problem, ear pain, hearing loss, rhinorrhea, sore throat and trouble swallowing.    Eyes: Negative for discharge, redness and visual disturbance.   Respiratory: Negative for cough, chest tightness, shortness of breath and wheezing.    Cardiovascular: Negative for chest pain, palpitations and leg swelling.   Gastrointestinal: Negative for abdominal pain, constipation, diarrhea, nausea and vomiting.   Endocrine: Negative for polydipsia, polyphagia and polyuria.   Genitourinary: Negative for decreased urine volume, dysuria and hematuria.   Musculoskeletal: Negative for arthralgias and myalgias.   Skin: Negative for color change and rash.   Neurological: Negative for dizziness, weakness, light-headedness and headaches.   Psychiatric/Behavioral: Negative for decreased concentration, dysphoric mood, sleep disturbance and suicidal ideas.       Objective:      Physical Exam   Constitutional: He is oriented to person, place, and time. He appears well-developed and well-nourished. No distress.   HENT:   Head: Normocephalic and  atraumatic.   Eyes: Conjunctivae, EOM and lids are normal. Pupils are equal, round, and reactive to light. No scleral icterus.   Neck: Full passive range of motion without pain. Neck supple. No JVD present. Carotid bruit is not present. No thyromegaly present.   Cardiovascular: Normal rate, regular rhythm, normal heart sounds and intact distal pulses. Exam reveals no S3, no S4 and no friction rub.   No murmur heard.  Pulmonary/Chest: Effort normal and breath sounds normal. He has no wheezes. He has no rhonchi. He has no rales.   Abdominal: Soft. Bowel sounds are normal. There is no tenderness.   Musculoskeletal: He exhibits no edema or tenderness.   Lymphadenopathy:        Head (right side): No submental and no submandibular adenopathy present.        Head (left side): No submental and no submandibular adenopathy present.     He has no cervical adenopathy.        Right: No supraclavicular adenopathy present.        Left: No supraclavicular adenopathy present.   Neurological: He is alert and oriented to person, place, and time.   Motor grossly intact.  Sensory grossly intact.  Symmetric facial movements palate elevated symmetrically tongue midline   Skin: Skin is warm and dry. No rash noted.   Left subclavian dialysis catheter in place.    Psychiatric: He has a normal mood and affect. His speech is normal and behavior is normal. Thought content normal.       Assessment:       1. Hospital discharge follow-up    2. Essential (primary) hypertension    3. Screening PSA (prostate specific antigen)    4. Acute renal failure, unspecified acute renal failure type    5. Idiopathic chronic gout of multiple sites without tophus    6. Anemia, unspecified type    7. Thrombocytopenia        Plan:       Problem List Items Addressed This Visit        Cardiac/Vascular    Essential (primary) hypertension (Chronic)    Current Assessment & Plan     The current medical regimen is effective;  continue present plan and medications.           Relevant Orders    CBC auto differential    Comprehensive metabolic panel    Lipid panel       Renal/    Acute renal failure    Current Assessment & Plan     Currently on HD MWF.  He has not see renal as an outpatient yet.  He will let me know if a new referral is needed            Hematology    Thrombocytopenia    Current Assessment & Plan     Monitor at follow up            Oncology    Anemia    Current Assessment & Plan     S/p PRBCs for 3 units.  Also getting Epo.  Monitor closely             Orthopedic    Chronic gout of multiple sites    Current Assessment & Plan     Currently controlled.  Monitor for need to start allopurinol           Other Visit Diagnoses     Hospital discharge follow-up    -  Primary  -    Generally doing well.  Monitor closely.  Discussed importance of healthy lifestyle.      Screening PSA (prostate specific antigen)    -  Check prior to follow up    Relevant Orders    PSA, Screening        Follow-up in about 6 weeks (around 11/6/2018) for Routine Physical.

## 2018-09-25 NOTE — PHYSICIAN QUERY
"PT Name: Duran Mccauley  MR #: [id number]     Physician Query Form - Etiology of Condition Clarification      Eunice Prince RN, CCDS  Contact Info: [phone]  [email or web address]    This form is a permanent document in the medical record.     Query Date: September 25, 2018    By submitting this query, we are merely seeking further clarification of documentation.  Please utilize your independent clinical judgment when addressing the question(s) below.     The medical record contains the following:    Findings Supporting Clinical Information Location in Medical Record    Gastrointestinal hemorrhage     Appreciate GI input.  Pan colitis per C scope.  Possible UC and started on steroids.  Pathology returned as nonspecific but mentioned inflammatory bowel disease was mentioned as a possibility  H/H stable post transfusion.  Some drop in H/H, but no evidence of bleeding.  Probably related to kidney disease.  Completely asymptomatic and would just monitor for now.  Home once outpatient HD arranged.     Transfuse one unit with inappropriate response in h/h.   Plan to transfuse two units PRBCs today. Hosp med Pn 9/23     Hematochezia - a colonoscopy showed pancolitis, suggestive of ulcerative colitis.  The terminal ileum was normal.     Pathology returned as nonspecific but mentioned inflammatory bowel disease was mentioned as a possibility GI PN 9/19    Impression:     - Biopsies performed to rule out ulcerative colitis.   Inflammation was found from the rectum to the cecum.   This was moderate in severity, new compared to   previous examinations. Biopsied.   - The examined portion of the ileum was normal.   Biopsied.   - The examination was otherwise normal.    Colonoscopy 9/14/18     Please document your best medical opinion regarding the etiology of   "Gastrointestinal Hemorrhage"  for which the primary focus of treatment is/was directed.     GI bleed secondary to possible ulcerative colitis            [    ]  " Clinically Undetermined    Please document in your progress notes daily for the duration of treatment, until resolved, and include in your discharge summary.

## 2018-09-25 NOTE — ASSESSMENT & PLAN NOTE
Currently on HD MWF.  He has not see renal as an outpatient yet.  He will let me know if a new referral is needed

## 2018-10-08 ENCOUNTER — PATIENT MESSAGE (OUTPATIENT)
Dept: FAMILY MEDICINE | Facility: CLINIC | Age: 58
End: 2018-10-08

## 2018-10-08 DIAGNOSIS — M10.079 ACUTE IDIOPATHIC GOUT OF FOOT, UNSPECIFIED LATERALITY: Primary | ICD-10-CM

## 2018-10-08 RX ORDER — COLCHICINE 0.6 MG/1
TABLET ORAL
Qty: 30 TABLET | Refills: 0 | Status: SHIPPED | OUTPATIENT
Start: 2018-10-08 | End: 2018-12-14

## 2018-10-09 ENCOUNTER — PATIENT MESSAGE (OUTPATIENT)
Dept: FAMILY MEDICINE | Facility: CLINIC | Age: 58
End: 2018-10-09

## 2018-10-09 DIAGNOSIS — M10.079 ACUTE IDIOPATHIC GOUT OF ANKLE, UNSPECIFIED LATERALITY: Primary | ICD-10-CM

## 2018-10-09 RX ORDER — METHYLPREDNISOLONE 4 MG/1
TABLET ORAL
Qty: 1 PACKAGE | Refills: 0 | Status: SHIPPED | OUTPATIENT
Start: 2018-10-09 | End: 2018-10-19 | Stop reason: ALTCHOICE

## 2018-10-11 ENCOUNTER — PATIENT MESSAGE (OUTPATIENT)
Dept: FAMILY MEDICINE | Facility: CLINIC | Age: 58
End: 2018-10-11

## 2018-10-19 ENCOUNTER — PATIENT MESSAGE (OUTPATIENT)
Dept: FAMILY MEDICINE | Facility: CLINIC | Age: 58
End: 2018-10-19

## 2018-10-19 ENCOUNTER — HOSPITAL ENCOUNTER (EMERGENCY)
Facility: HOSPITAL | Age: 58
Discharge: HOME | End: 2018-10-19
Attending: EMERGENCY MEDICINE
Payer: COMMERCIAL

## 2018-10-19 ENCOUNTER — NURSE TRIAGE (OUTPATIENT)
Dept: ADMINISTRATIVE | Facility: CLINIC | Age: 58
End: 2018-10-19

## 2018-10-19 ENCOUNTER — TELEPHONE (OUTPATIENT)
Dept: FAMILY MEDICINE | Facility: CLINIC | Age: 58
End: 2018-10-19

## 2018-10-19 VITALS
SYSTOLIC BLOOD PRESSURE: 182 MMHG | OXYGEN SATURATION: 100 % | RESPIRATION RATE: 18 BRPM | TEMPERATURE: 99 F | HEART RATE: 103 BPM | BODY MASS INDEX: 21.86 KG/M2 | HEIGHT: 66 IN | DIASTOLIC BLOOD PRESSURE: 97 MMHG | WEIGHT: 136 LBS

## 2018-10-19 DIAGNOSIS — M10.9 GOUT, UNSPECIFIED CAUSE, UNSPECIFIED CHRONICITY, UNSPECIFIED SITE: ICD-10-CM

## 2018-10-19 DIAGNOSIS — I10 HYPERTENSION, UNSPECIFIED TYPE: Primary | ICD-10-CM

## 2018-10-19 DIAGNOSIS — M10.079 ACUTE IDIOPATHIC GOUT OF ANKLE, UNSPECIFIED LATERALITY: ICD-10-CM

## 2018-10-19 PROCEDURE — 63600175 PHARM REV CODE 636 W HCPCS: Performed by: EMERGENCY MEDICINE

## 2018-10-19 PROCEDURE — 99284 EMERGENCY DEPT VISIT MOD MDM: CPT | Mod: 25

## 2018-10-19 PROCEDURE — 96372 THER/PROPH/DIAG INJ SC/IM: CPT

## 2018-10-19 RX ORDER — PREDNISONE 50 MG/1
50 TABLET ORAL DAILY
Qty: 5 TABLET | Refills: 0 | Status: SHIPPED | OUTPATIENT
Start: 2018-10-19 | End: 2018-10-24

## 2018-10-19 RX ORDER — MORPHINE SULFATE 15 MG/1
15 TABLET ORAL EVERY 4 HOURS PRN
Qty: 11 TABLET | Refills: 0 | Status: SHIPPED | OUTPATIENT
Start: 2018-10-19 | End: 2018-10-29 | Stop reason: ALTCHOICE

## 2018-10-19 RX ORDER — METHYLPREDNISOLONE 4 MG/1
TABLET ORAL
Qty: 1 PACKAGE | Refills: 0 | Status: CANCELLED | OUTPATIENT
Start: 2018-10-19

## 2018-10-19 RX ORDER — MORPHINE SULFATE 4 MG/ML
4 INJECTION, SOLUTION INTRAMUSCULAR; INTRAVENOUS
Status: COMPLETED | OUTPATIENT
Start: 2018-10-19 | End: 2018-10-19

## 2018-10-19 RX ORDER — DEXAMETHASONE SODIUM PHOSPHATE 4 MG/ML
4 INJECTION, SOLUTION INTRA-ARTICULAR; INTRALESIONAL; INTRAMUSCULAR; INTRAVENOUS; SOFT TISSUE
Status: COMPLETED | OUTPATIENT
Start: 2018-10-19 | End: 2018-10-19

## 2018-10-19 RX ADMIN — DEXAMETHASONE SODIUM PHOSPHATE 4 MG: 4 INJECTION, SOLUTION INTRAMUSCULAR; INTRAVENOUS at 09:10

## 2018-10-19 RX ADMIN — MORPHINE SULFATE 4 MG: 4 INJECTION, SOLUTION INTRAMUSCULAR; INTRAVENOUS at 09:10

## 2018-10-19 NOTE — TELEPHONE ENCOUNTER
----- Message from John Taveras sent at 10/19/2018  4:00 PM CDT -----  Contact: Merline swanson/[phone]  Refill: methylPREDNISolone (MEDROL DOSEPACK) 4 mg tablet       [facility] [address] [place] [address] [address] AT [facility] [address] [phone] (Phone)  [phone] (Fax)    Thank you

## 2018-10-19 NOTE — TELEPHONE ENCOUNTER
----- Message from Angela Carrillo sent at 10/19/2018  4:34 PM CDT -----  Contact: Self  Pt is calling to get a refill on medication methylPREDNISolone (MEDROL DOSEPACK) 4 mg tablet. Please call pt at [phone]      [facility] [id number] - [address]  [address] [facility] [address]  [address]  [email or web address] [address]  Phone: [phone] Fax: [phone]

## 2018-10-19 NOTE — TELEPHONE ENCOUNTER
"  Reason for Disposition   Weakness (i.e., loss of strength) of new onset in hand or fingers  (Exceptions: not truly weak, hand feels weak because of pain; weakness present > 2 weeks)    Answer Assessment - Initial Assessment Questions  1. ONSET: "When did the pain start?"      Last night  2. LOCATION: "Where is the pain located?"      Right wrist  3. PAIN: "How bad is the pain?" (Scale 1-10; or mild, moderate, severe)    - MILD (1-3): doesn't interfere with normal activities    - MODERATE (4-7): interferes with normal activities (e.g., work or school) or awakens from sleep    - SEVERE (8-10): excruciating pain, unable to use hand at all      20/10  4. WORK OR EXERCISE: "Has there been any recent work or exercise that involved this part of the body?"      No  5. CAUSE: "What do you think is causing the pain?"      Gout flare up  6. AGGRAVATING FACTORS: "What makes the pain worse?" (e.g., using computer)      Moving it  7. OTHER SYMPTOMS: "Do you have any other symptoms?" (e.g., neck pain, swelling, rash, numbness, fever)      Right wrist swelling, moderate  8. PREGNANCY: "Is there any chance you are pregnant?" "When was your last menstrual period?"      n/a    Protocols used: ST HAND AND WRIST PAIN-A-    Patient c/o gout flare up. States he was given a steroid that cleared up pain before and wants to know if steroid can be ordered for him again. Spoke to on call provider, Dr. Christian. Dr. Christian states patient should be evaluated at an . Patient advised and voices understanding. Please contact caller directly to discuss any further care advice.  "

## 2018-10-20 RX ORDER — METHYLPREDNISOLONE 4 MG/1
TABLET ORAL
Qty: 1 PACKAGE | Refills: 0 | OUTPATIENT
Start: 2018-10-20

## 2018-10-20 NOTE — ED PROVIDER NOTES
"Encounter Date: 10/19/2018    SCRIBE #1 NOTE: I, Stuart Mcdaniel, am scribing for, and in the presence of,  Dann Gutierrez MD . I have scribed the following portions of the note - Other sections scribed: HPI, ROS, PE .       History     Chief Complaint   Patient presents with    Hypertension     Pt went to  for gout to right arm and was instructed to come to emergency for his blood pressure    Gout     Pt presenting for  gout / swelling to right arm.  Swelling noted at 2200 last evening.     CC: Hypertension ; Gout    This 58 y.o Male with pmhx of gout, HTN, dialysis (MWF), and hyperlipidemia presents to the ED c/o acute onset R wrist swelling and pain consistent with his gout. Pt states "my gout started to flare up in my hand last night". Pt also reports that he has high blood pressure. Pt presented to dialysis this morning and took his BP medication after as usual. Pt denies fever, chills, and cough. No other symptoms to report.       The history is provided by the patient. No  was used.     Review of patient's allergies indicates:  No Known Allergies  Past Medical History:   Diagnosis Date    Dialysis patient     MWF    Eczema     Hepatitis B     treated 2010s and negative RNA load afterward    Hyperlipidemia     Hypertension      Past Surgical History:   Procedure Laterality Date    COLONOSCOPY N/A 9/14/2018    Procedure: COLONOSCOPY;  Surgeon: Viri Blair MD;  Location: [facility] END[facility];  Service: Endoscopy;  Laterality: N/A;    COLONOSCOPY N/A 9/14/2018    Performed by Viri Blair MD at [facility] END[facility]    TUNNELED VENOUS CATHETER PLACEMENT      HD cath     Family History   Problem Relation Age of Onset    Heart disease Father     Diabetes Brother      Social History     Tobacco Use    Smoking status: Never Smoker    Smokeless tobacco: Never Used   Substance Use Topics    Alcohol use: Yes     Alcohol/week: 0.6 oz     Types: 1 Cans of beer per week    Drug use: No     Review of Systems "   Constitutional: Negative for chills and fever.   HENT: Negative for sore throat.    Eyes: Negative for redness.   Respiratory: Negative for cough.    Cardiovascular: Negative for chest pain.   Gastrointestinal: Negative for diarrhea, nausea and vomiting.   Genitourinary: Negative for difficulty urinating, dysuria and frequency.   Musculoskeletal: Positive for joint swelling.        (+) R wrist pain   Skin: Negative for rash.   Neurological: Negative for headaches.       Physical Exam     Initial Vitals [10/19/18 2034]   BP Pulse Resp Temp SpO2   (!) 194/91 (!) 116 20 98.4 °F (36.9 °C) 99 %      MAP       --         Physical Exam    Nursing note and vitals reviewed.  Constitutional: He is not diaphoretic. No distress.   permacath present.    HENT:   Head: Normocephalic and atraumatic.   Mouth/Throat: Oropharynx is clear and moist.   Eyes: Conjunctivae and EOM are normal. Pupils are equal, round, and reactive to light. No scleral icterus.   Neck: Normal range of motion. Neck supple. No JVD present.   Cardiovascular: Normal rate, regular rhythm and intact distal pulses.   Good radial pulse    Pulmonary/Chest: Breath sounds normal. No stridor. No respiratory distress.   Abdominal: Soft. Bowel sounds are normal. He exhibits no distension. There is no tenderness.   Musculoskeletal: Normal range of motion. He exhibits no edema or tenderness.        Right wrist: He exhibits swelling.   R wrist is warm to the touch, with no cellulitis or abscess    Neurological: He is alert and oriented to person, place, and time. He has normal strength. No cranial nerve deficit.   Skin: Skin is warm and dry. No rash noted.   Psychiatric: He has a normal mood and affect.         ED Course   Procedures  Labs Reviewed - No data to display       Imaging Results    None          Medical Decision Making:   Differential Diagnosis:   Gout  - exam consistent with gout.  Patient has no signs of cellulitis or abscess.  No trauma to his wrist but did  not think a fracture or ligamentous damage.  Patient says this is the exact spot that he has gout.  Gave patient shot of morphine and Decadron in the emergency department.  That decreases blood pressure greatly before discharge. I do think his elevated blood pressure was secondary to his pain.  Will discharge patient on steroid and pain medications.  He has end-stage renal something away from NSAIDs at this particular time.  No labs due to he is afebrile has a history gout.  Patient follow up with his primary care. I discussed with the patient the diagnosis, treatment plan, indications for return to the emergency department, and for expected follow-up. The patient verbalized an understanding. The patient is asked if there are any questions or concerns. We discuss the case, until all issues are addressed to the patients satisfaction. Patient understands and is agreeable to the plan.   Dann Pena Attestation:   Scribe #1: I performed the above scribed service and the documentation accurately describes the services I performed. I attest to the accuracy of the note.    Attending Attestation:           Physician Attestation for Scribe:  Physician Attestation Statement for Scribe #1: I, Dann Gutierrez MD, reviewed documentation, as scribed by Stuart Mcdaniel in my presence, and it is both accurate and complete.                    Clinical Impression:   The primary encounter diagnosis was Hypertension, unspecified type. A diagnosis of Gout, unspecified cause, unspecified chronicity, unspecified site was also pertinent to this visit.                             Dann Gutierrez MD  10/20/18 2033

## 2018-10-20 NOTE — ED TRIAGE NOTES
Presented to ED for hypertension. Pt originally went to  today after dialysis for gout and swelling to R-hand. Pt was hypertensive at urgent care and it was recommend that he goes to the ED. He had 1L taken out at dialysis today. Reports compliance with HTN medication. Dialysis days MWF. Denies any CP, changes in vision, HA, n/v/d/f.

## 2018-10-29 ENCOUNTER — OFFICE VISIT (OUTPATIENT)
Dept: FAMILY MEDICINE | Facility: CLINIC | Age: 58
End: 2018-10-29
Payer: COMMERCIAL

## 2018-10-29 VITALS
SYSTOLIC BLOOD PRESSURE: 160 MMHG | BODY MASS INDEX: 19.65 KG/M2 | DIASTOLIC BLOOD PRESSURE: 80 MMHG | HEIGHT: 66 IN | TEMPERATURE: 98 F | OXYGEN SATURATION: 98 % | WEIGHT: 122.25 LBS | HEART RATE: 88 BPM

## 2018-10-29 DIAGNOSIS — M10.331 ACUTE GOUT DUE TO RENAL IMPAIRMENT INVOLVING RIGHT WRIST: Primary | ICD-10-CM

## 2018-10-29 DIAGNOSIS — I10 ESSENTIAL (PRIMARY) HYPERTENSION: Chronic | ICD-10-CM

## 2018-10-29 PROCEDURE — 3008F BODY MASS INDEX DOCD: CPT | Mod: CPTII,S$GLB,, | Performed by: INTERNAL MEDICINE

## 2018-10-29 PROCEDURE — 99214 OFFICE O/P EST MOD 30 MIN: CPT | Mod: S$GLB,,, | Performed by: INTERNAL MEDICINE

## 2018-10-29 PROCEDURE — 3077F SYST BP >= 140 MM HG: CPT | Mod: CPTII,S$GLB,, | Performed by: INTERNAL MEDICINE

## 2018-10-29 PROCEDURE — 99999 PR PBB SHADOW E&M-EST. PATIENT-LVL IV: CPT | Mod: PBBFAC,,, | Performed by: INTERNAL MEDICINE

## 2018-10-29 PROCEDURE — 3079F DIAST BP 80-89 MM HG: CPT | Mod: CPTII,S$GLB,, | Performed by: INTERNAL MEDICINE

## 2018-10-29 RX ORDER — PREDNISONE 10 MG/1
TABLET ORAL
Qty: 21 TABLET | Refills: 0 | Status: SHIPPED | OUTPATIENT
Start: 2018-10-29 | End: 2018-11-09

## 2018-10-29 RX ORDER — OXYCODONE AND ACETAMINOPHEN 5; 325 MG/1; MG/1
1 TABLET ORAL EVERY 8 HOURS PRN
Qty: 10 TABLET | Refills: 0 | Status: SHIPPED | OUTPATIENT
Start: 2018-10-29 | End: 2018-12-14

## 2018-10-29 RX ORDER — METHYLPREDNISOLONE 4 MG/1
TABLET ORAL
Qty: 1 PACKAGE | Refills: 0 | Status: SHIPPED | OUTPATIENT
Start: 2018-10-29 | End: 2018-10-29 | Stop reason: ALTCHOICE

## 2018-10-29 NOTE — PATIENT INSTRUCTIONS
Eating to Prevent Gout  Gout is a painful form of arthritis caused by an excess of uric acid. This is a waste product made by the body. It builds up in the body and forms crystals that collect in the joints, bringing on a gout attack. Alcohol and certain foods can trigger a gout attack. Below are some guidelines for changing your diet to help you manage gout. Your healthcare provider can work with you to determine the best eating plan for you. Know that diet is only one part of managing gout. Take your medicines as prescribed and follow the other guidelines your healthcare provider has given you.  Foods to limit  Eating too many foods containing purines may increase the levels of uric acid in your body and increase your risk for a gout attack. It may be best to limit these high-purine foods:  · Alcohol (beer, red wine). You may be told to avoid alcohol completely.  · Certain fish (anchovies, sardines, fish roes, herring, tuna, mussels, codfish, scallops, trout, and jeremiah)  · Certain meats (red meat, processed meat, bolaños, turkey, wild game, and goose)  · Sauces and gravies made with meat  · Organ meats (such as liver, kidneys, sweetbreads, and tripe)  · Legumes (such as dried beans, peas)  · Mushrooms, spinach, asparagus, and cauliflower  · Yeast and yeast extract supplements  Foods to try  Some foods may be helpful for people with gout. You may want to try adding some of the following foods to your diet:  · Dark berries: These include blueberries, blackberries, and cherries. These berries contain chemicals that may lower uric acid.  · Tofu: Tofu, which is made from soy, is a good source of protein. Studies have shown that it may be a better choice than meat for people with gout.  · Omega fatty acids: These acids are found in fatty fish (such as salmon), certain oils (such as flax, olive, or nut oils), or nuts. They may help prevent inflammation due to gout.  The following guidelines are recommended by the  American Medical Association for people with gout. Your diet should be:  · High in fiber, whole grains, fruits, and vegetables.  · Low in protein (15% of calories should come from protein. Choose lean sources such as soy, lean meats, and poultry).  · Low in fat (no more than 30% of calories should come from fat, with only 10% coming from animal fat).   Date Last Reviewed: 6/17/2015  © 0168-4148 [employer]. [address] [place] [address], [place] [address]. All rights reserved. This information is not intended as a substitute for professional medical care. Always follow your healthcare professional's instructions.

## 2018-10-29 NOTE — PROGRESS NOTES
This note was created by combination of typed  and Dragon dictation.  Transcription errors may be present.  If there are any questions, please contact me.    Assessment & Plan:   Acute gout due to renal impairment involving right wrist  -I will try him with a longer steroid taper.  Refilled Percocet to take for breakthrough pain.  May very well need allopurinol for preventative but for right now with his current flare I would not initiate it.  He has an upcoming follow-up with his primary care doctor.  Has pre visit labs scheduled for .  Check uric acid though may be falsely low with acute flare.  -     oxyCODONE-acetaminophen (PERCOCET) 5-325 mg per tablet; Take 1 tablet by mouth every 8 (eight) hours as needed for Pain.  Dispense: 10 tablet; Refill: 0  -     Discontinue: methylPREDNISolone (MEDROL DOSEPACK) 4 mg tablet; use as directed  Dispense: 1 Package; Refill: 0  -     Uric acid; Future; Expected date: 10/29/2018  -     predniSONE (DELTASONE) 10 MG tablet; 40 mg daily x 3 days, then 20 mg x 3 days, then 10 mg x 3 day, then stop  Dispense: 21 tablet; Refill: 0    Essential (primary) hypertension  -improved some on repeat.  I think a combination of pain as well as having recently taking his blood pressure medicine maybe an hour ago.  He has a home cuff and he will monitor.  On amlodipine 10.  If BP remains high I have asked him to contact our office.    He declines flu shot    Medications Discontinued During This Encounter   Medication Reason    morphine (MSIR) 15 MG tablet Therapy completed    methylPREDNISolone (MEDROL DOSEPACK) 4 mg tablet Therapy completed       meds sent this encounter:  Medications Ordered This Encounter   Medications    oxyCODONE-acetaminophen (PERCOCET) 5-325 mg per tablet     Sig: Take 1 tablet by mouth every 8 (eight) hours as needed for Pain.     Dispense:  10 tablet     Refill:  0    predniSONE (DELTASONE) 10 MG tablet     Si mg daily x 3 days, then 20  mg x 3 days, then 10 mg x 3 day, then stop     Dispense:  21 tablet     Refill:  0       Follow Up: No Follow-up on file.    Subjective:     Chief Complaint   Patient presents with    Hypertension     high at dialysis  170/110    Gout       HPI  Duran is a 58 y.o. male, last appointment with this clinic was 9/25/2018.    Recent emergency room visit for gout manifesting as acute right wrist swelling and pain. He also had high blood pressure which responded to pain medication at the emergency room.    He notes that the swelling never really quite resolved in the right wrist and now it has flared up again.  With resultant significant pain.  The morphine caused him to feel loopy and he did not like it.  Does not think that he had any sort of recent dietary indiscretions.  Last ER he had an injection and was discharged with an oral prednisone prescription for 5 days.    He notes that this is happening a bit more frequently than he would like.  Had a flare-up in his knee, and then again here in the wrist.  His doctors have been considering allopurinol for him.    Blood pressure high today on intake better on repeat but still high.  He attributes this to pain as well as having just taken his blood pressure medication may be a an hour ago.  It was high on dialysis but combination of pain as well as not taking his medication until after dialysis.  He does have a home cuff.    Patient Care Team:  Patrick Cook MD as PCP - General (Internal Medicine)    Patient Active Problem List    Diagnosis Date Noted    Anemia 09/25/2018    Chronic gout of multiple sites 09/13/2018    Thrombocytopenia 09/13/2018    Weight loss, unintentional 09/13/2018    Malnutrition of moderate degree 09/13/2018    Acute renal failure 09/12/2018    Erectile dysfunction 06/10/2016    Pure hypercholesterolemia 12/08/2015    Essential (primary) hypertension 12/04/2015       PAST MEDICAL HISTORY:  Past Medical History:   Diagnosis Date     Dialysis patient     MWF    Eczema     Hepatitis B     treated 2010s and negative RNA load afterward    Hyperlipidemia     Hypertension        PAST SURGICAL HISTORY:  Past Surgical History:   Procedure Laterality Date    COLONOSCOPY N/A 9/14/2018    Procedure: COLONOSCOPY;  Surgeon: Viri Blair MD;  Location: [facility] ENDO;  Service: Endoscopy;  Laterality: N/A;    COLONOSCOPY N/A 9/14/2018    Performed by Viri Blair MD at [facility] ENDO    TUNNELED VENOUS CATHETER PLACEMENT      HD cath       SOCIAL HISTORY:  Social History     Socioeconomic History    Marital status:      Spouse name: Not on file    Number of children: Not on file    Years of education: Not on file    Highest education level: Not on file   Social Needs    Financial resource strain: Not on file    Food insecurity - worry: Not on file    Food insecurity - inability: Not on file    Transportation needs - medical: Not on file    Transportation needs - non-medical: Not on file   Occupational History    Not on file   Tobacco Use    Smoking status: Never Smoker    Smokeless tobacco: Never Used   Substance and Sexual Activity    Alcohol use: Yes     Alcohol/week: 0.6 oz     Types: 1 Cans of beer per week    Drug use: No    Sexual activity: Yes     Partners: Female   Other Topics Concern    Not on file   Social History Narrative    Not on file       ALLERGIES AND MEDICATIONS: updated and reviewed.  Review of patient's allergies indicates:  No Known Allergies  Current Outpatient Medications   Medication Sig Dispense Refill    amLODIPine (NORVASC) 10 MG tablet Take 1 tablet (10 mg total) by mouth once daily. 30 tablet 11    atorvastatin (LIPITOR) 20 MG tablet Take 1 tablet (20 mg total) by mouth once daily. 90 tablet 3    colchicine (COLCRYS) 0.6 mg tablet 1 tab PO every 2 weeks PRN gout flare 30 tablet 0    morphine (MSIR) 15 MG tablet Take 1 tablet (15 mg total) by mouth every 4 (four) hours as needed for Pain. 11 tablet 0  "    No current facility-administered medications for this visit.        Review of Systems   All other systems reviewed and are negative.      Objective:   Physical Exam   Vitals:    10/29/18 1029 10/29/18 1040   BP: (!) 160/100 (!) 160/80   BP Location:  Left arm   Patient Position:  Sitting   BP Method:  Medium (Manual)   Pulse: 88    Temp: 98.3 °F (36.8 °C)    SpO2: 98%    Weight: 55.4 kg (122 lb 3.9 oz)    Height: 5' 6" (1.676 m)     Body mass index is 19.73 kg/m².  Weight: 55.4 kg (122 lb 3.9 oz)   Height: 5' 6" (167.6 cm)     Physical Exam   Constitutional: He is oriented to person, place, and time. He appears well-developed and well-nourished. No distress.   Eyes: EOM are normal.   Cardiovascular: Regular rhythm and normal heart sounds.   No murmur heard.  Borderline tachycardic   Pulmonary/Chest: Effort normal and breath sounds normal.   Musculoskeletal:   The right wrist and distal half of the forearm are swollen, it is warm, very tender.  Some distal swelling as well.   Neurological: He is alert and oriented to person, place, and time. Coordination normal.   Skin: Skin is warm and dry.   Psychiatric: He has a normal mood and affect. His behavior is normal. Thought content normal.     "

## 2018-10-31 ENCOUNTER — TELEPHONE (OUTPATIENT)
Dept: FAMILY MEDICINE | Facility: CLINIC | Age: 58
End: 2018-10-31

## 2018-10-31 NOTE — TELEPHONE ENCOUNTER
----- Message from Cat Shepherd sent at 10/31/2018  9:53 AM CDT -----  Contact: self  [phone]  Pt is requesting a bio metric screening for his job to be added on to his labs for 11-1-2018. Pls call pt [phone]. Thanks......Jeni

## 2018-11-01 ENCOUNTER — CLINICAL SUPPORT (OUTPATIENT)
Dept: FAMILY MEDICINE | Facility: CLINIC | Age: 58
End: 2018-11-01
Payer: COMMERCIAL

## 2018-11-01 ENCOUNTER — PATIENT MESSAGE (OUTPATIENT)
Dept: FAMILY MEDICINE | Facility: CLINIC | Age: 58
End: 2018-11-01

## 2018-11-01 ENCOUNTER — LAB VISIT (OUTPATIENT)
Dept: LAB | Facility: HOSPITAL | Age: 58
End: 2018-11-01
Attending: INTERNAL MEDICINE
Payer: COMMERCIAL

## 2018-11-01 VITALS — TEMPERATURE: 98 F

## 2018-11-01 DIAGNOSIS — Z12.5 SCREENING PSA (PROSTATE SPECIFIC ANTIGEN): ICD-10-CM

## 2018-11-01 DIAGNOSIS — M10.331 ACUTE GOUT DUE TO RENAL IMPAIRMENT INVOLVING RIGHT WRIST: ICD-10-CM

## 2018-11-01 DIAGNOSIS — I10 ESSENTIAL (PRIMARY) HYPERTENSION: Chronic | ICD-10-CM

## 2018-11-01 DIAGNOSIS — Z23 NEED FOR INFLUENZA VACCINATION: Primary | ICD-10-CM

## 2018-11-01 LAB
ALBUMIN SERPL BCP-MCNC: 3.5 G/DL
ALP SERPL-CCNC: 80 U/L
ALT SERPL W/O P-5'-P-CCNC: 13 U/L
ANION GAP SERPL CALC-SCNC: 9 MMOL/L
AST SERPL-CCNC: 19 U/L
BASOPHILS # BLD AUTO: 0.01 K/UL
BASOPHILS NFR BLD: 0.1 %
BILIRUB SERPL-MCNC: 0.9 MG/DL
BUN SERPL-MCNC: 36 MG/DL
CALCIUM SERPL-MCNC: 9.6 MG/DL
CHLORIDE SERPL-SCNC: 100 MMOL/L
CHOLEST SERPL-MCNC: 158 MG/DL
CHOLEST/HDLC SERPL: 3.6 {RATIO}
CO2 SERPL-SCNC: 25 MMOL/L
COMPLEXED PSA SERPL-MCNC: 0.54 NG/ML
CREAT SERPL-MCNC: 7.8 MG/DL
DIFFERENTIAL METHOD: ABNORMAL
EOSINOPHIL # BLD AUTO: 0 K/UL
EOSINOPHIL NFR BLD: 0.1 %
ERYTHROCYTE [DISTWIDTH] IN BLOOD BY AUTOMATED COUNT: 17.6 %
EST. GFR  (AFRICAN AMERICAN): 7.9 ML/MIN/1.73 M^2
EST. GFR  (NON AFRICAN AMERICAN): 6.8 ML/MIN/1.73 M^2
GLUCOSE SERPL-MCNC: 88 MG/DL
HCT VFR BLD AUTO: 21.2 %
HDLC SERPL-MCNC: 44 MG/DL
HDLC SERPL: 27.8 %
HGB BLD-MCNC: 6.8 G/DL
IMM GRANULOCYTES # BLD AUTO: 0.36 K/UL
IMM GRANULOCYTES NFR BLD AUTO: 3.1 %
LDLC SERPL CALC-MCNC: 82 MG/DL
LYMPHOCYTES # BLD AUTO: 1.9 K/UL
LYMPHOCYTES NFR BLD: 16.3 %
MCH RBC QN AUTO: 28.5 PG
MCHC RBC AUTO-ENTMCNC: 32.1 G/DL
MCV RBC AUTO: 89 FL
MONOCYTES # BLD AUTO: 1 K/UL
MONOCYTES NFR BLD: 8.5 %
NEUTROPHILS # BLD AUTO: 8.4 K/UL
NEUTROPHILS NFR BLD: 71.9 %
NONHDLC SERPL-MCNC: 114 MG/DL
NRBC BLD-RTO: 2 /100 WBC
PLATELET # BLD AUTO: 129 K/UL
PMV BLD AUTO: 10.2 FL
POTASSIUM SERPL-SCNC: 4.8 MMOL/L
PROT SERPL-MCNC: 7 G/DL
RBC # BLD AUTO: 2.39 M/UL
SODIUM SERPL-SCNC: 134 MMOL/L
TRIGL SERPL-MCNC: 160 MG/DL
URATE SERPL-MCNC: 5.9 MG/DL
WBC # BLD AUTO: 11.63 K/UL

## 2018-11-01 PROCEDURE — 80053 COMPREHEN METABOLIC PANEL: CPT

## 2018-11-01 PROCEDURE — 84153 ASSAY OF PSA TOTAL: CPT

## 2018-11-01 PROCEDURE — 84550 ASSAY OF BLOOD/URIC ACID: CPT

## 2018-11-01 PROCEDURE — 90686 IIV4 VACC NO PRSV 0.5 ML IM: CPT | Mod: S$GLB,,, | Performed by: INTERNAL MEDICINE

## 2018-11-01 PROCEDURE — 99999 PR PBB SHADOW E&M-EST. PATIENT-LVL I: CPT | Mod: PBBFAC,,,

## 2018-11-01 PROCEDURE — 90471 IMMUNIZATION ADMIN: CPT | Mod: S$GLB,,, | Performed by: INTERNAL MEDICINE

## 2018-11-01 PROCEDURE — 85025 COMPLETE CBC W/AUTO DIFF WBC: CPT

## 2018-11-01 PROCEDURE — 80061 LIPID PANEL: CPT

## 2018-11-01 PROCEDURE — 36415 COLL VENOUS BLD VENIPUNCTURE: CPT | Mod: PO

## 2018-11-09 ENCOUNTER — OFFICE VISIT (OUTPATIENT)
Dept: FAMILY MEDICINE | Facility: CLINIC | Age: 58
End: 2018-11-09
Payer: COMMERCIAL

## 2018-11-09 VITALS
TEMPERATURE: 98 F | DIASTOLIC BLOOD PRESSURE: 96 MMHG | OXYGEN SATURATION: 97 % | WEIGHT: 130.31 LBS | HEIGHT: 66 IN | BODY MASS INDEX: 20.94 KG/M2 | HEART RATE: 98 BPM | SYSTOLIC BLOOD PRESSURE: 170 MMHG

## 2018-11-09 DIAGNOSIS — N17.9 ACUTE RENAL FAILURE, UNSPECIFIED ACUTE RENAL FAILURE TYPE: ICD-10-CM

## 2018-11-09 DIAGNOSIS — I10 ESSENTIAL (PRIMARY) HYPERTENSION: Chronic | ICD-10-CM

## 2018-11-09 DIAGNOSIS — M10.9 ACUTE GOUT OF RIGHT HAND, UNSPECIFIED CAUSE: ICD-10-CM

## 2018-11-09 DIAGNOSIS — Z00.00 ANNUAL VISIT FOR GENERAL ADULT MEDICAL EXAMINATION WITHOUT ABNORMAL FINDINGS: Primary | ICD-10-CM

## 2018-11-09 PROCEDURE — 3077F SYST BP >= 140 MM HG: CPT | Mod: CPTII,S$GLB,, | Performed by: NURSE PRACTITIONER

## 2018-11-09 PROCEDURE — 99999 PR PBB SHADOW E&M-EST. PATIENT-LVL IV: CPT | Mod: PBBFAC,,, | Performed by: NURSE PRACTITIONER

## 2018-11-09 PROCEDURE — 99214 OFFICE O/P EST MOD 30 MIN: CPT | Mod: S$GLB,,, | Performed by: NURSE PRACTITIONER

## 2018-11-09 PROCEDURE — 3080F DIAST BP >= 90 MM HG: CPT | Mod: CPTII,S$GLB,, | Performed by: NURSE PRACTITIONER

## 2018-11-09 PROCEDURE — 3008F BODY MASS INDEX DOCD: CPT | Mod: CPTII,S$GLB,, | Performed by: NURSE PRACTITIONER

## 2018-11-09 RX ORDER — PREDNISONE 10 MG/1
TABLET ORAL
Qty: 21 TABLET | Refills: 0 | Status: SHIPPED | OUTPATIENT
Start: 2018-11-09 | End: 2018-11-20

## 2018-11-09 NOTE — PATIENT INSTRUCTIONS

## 2018-11-09 NOTE — PROGRESS NOTES
Subjective:       Patient ID: Duran Mccauley is a 58 y.o. male.    Chief Complaint: Annual Exam and Gout    Duran Mccauley is a 57 y.o. male with multiple medical diagnoses as listed in the medical history and problem list that presents for routine physical.  Pt is new to me but known to the clinic with his last physical being 11/08/2017.     He has his home BP log with him that shows home readings of 100s/60-70s.  Rare hypotensive symptoms.        Patient also here for  Flare up of symptoms on Yesterday.  Symptoms are present in the right hand.  He is right-hand dominant.  He has right hand and wrist pain and swelling.  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 The incident occurred at home. There was no injury mechanism. The pain is present in the right hand. The quality of the pain is described as aching. The pain does not radiate. The pain is at a severity of 7/10. The pain is moderate. The pain has been constant since the incident. Pertinent negatives include no chest pain, muscle weakness, numbness or tingling. Associated symptoms comments: Right hand pain and swelling  . The symptoms are aggravated by movement, lifting and palpation. He has tried nothing for the symptoms.     Past Medical History:   Diagnosis Date    Dialysis patient     MWF    Eczema     Hepatitis B     treated 2010s and negative RNA load afterward    Hyperlipidemia     Hypertension      Past Surgical History:   Procedure Laterality Date    COLONOSCOPY N/A 9/14/2018    Procedure: COLONOSCOPY;  Surgeon: Viri Blair MD;  Location: [facility];  Service: Endoscopy;  Laterality: N/A;    COLONOSCOPY N/A 9/14/2018    Performed by [facility] at [facility] ENDO    TUNNELED VENOUS CATHETER PLACEMENT      HD cath     Social History     Socioeconomic History    Marital status:      Spouse name: Not on file    Number of children: Not on file    Years of education: Not  on file    Highest education level: Not on file   Social Needs    Financial resource strain: Not on file    Food insecurity - worry: Not on file    Food insecurity - inability: Not on file    Transportation needs - medical: Not on file    Transportation needs - non-medical: Not on file   Occupational History    Not on file   Tobacco Use    Smoking status: Never Smoker    Smokeless tobacco: Never Used   Substance and Sexual Activity    Alcohol use: Yes     Alcohol/week: 0.6 oz     Types: 1 Cans of beer per week    Drug use: No    Sexual activity: Yes     Partners: Female   Other Topics Concern    Not on file   Social History Narrative    Not on file     Family History   Problem Relation Age of Onset    Heart disease Father     Diabetes Brother      Review of Systems   Constitutional: Negative for chills, diaphoresis, fatigue and fever.   HENT: Negative for congestion, postnasal drip, rhinorrhea, sinus pressure and sneezing.    Respiratory: Negative for cough, chest tightness and shortness of breath.    Cardiovascular: Negative for chest pain, palpitations and leg swelling.   Gastrointestinal: Negative for abdominal pain, diarrhea, nausea and vomiting.   Genitourinary: Negative for decreased urine volume and difficulty urinating.   Musculoskeletal: Positive for arthralgias (right hand) and joint swelling (right hand ). Negative for back pain and myalgias.   Skin: Negative for color change and rash.   Neurological: Negative for dizziness, tingling, weakness, light-headedness, numbness and headaches.   Hematological: Negative for adenopathy. Does not bruise/bleed easily.       Objective:      Physical Exam   Constitutional: He is oriented to person, place, and time. Vital signs are normal. He appears well-developed and well-nourished.   HENT:   Head: Normocephalic and atraumatic.   Right Ear: External ear normal.   Left Ear: External ear normal.   Nose: Nose normal.   Mouth/Throat: Oropharynx is clear  and moist. No oropharyngeal exudate.   Cardiovascular: Normal rate, regular rhythm and normal heart sounds.   Pulmonary/Chest: Effort normal and breath sounds normal.   Abdominal: Soft. Bowel sounds are normal.   Musculoskeletal:        Right wrist: He exhibits decreased range of motion, tenderness and swelling. He exhibits no bony tenderness, no effusion, no crepitus, no deformity and no laceration.        Right hand: He exhibits decreased range of motion, tenderness and swelling. He exhibits no bony tenderness, normal two-point discrimination, normal capillary refill, no deformity and no laceration.   Swelling and localized warmth noted back of the right hand.  There is some color changes observed.  Warmth palpable and tenderness to palpation.  Decreased range of motion due to pain.  No bruising observed.      Neurological: He is alert and oriented to person, place, and time.   Skin: Skin is warm, dry and intact.   Psychiatric: He has a normal mood and affect.       Assessment:       1. Annual visit for general adult medical examination without abnormal findings    2. Acute gout of right hand, unspecified cause    3. Acute renal failure, unspecified acute renal failure type    4. Essential (primary) hypertension        Plan:       Duran was seen today for annual exam and gout.    Diagnoses and all orders for this visit:    Annual visit for general adult medical examination without abnormal findings  Education provided about preventive health examinations and procedures; discussed patient's health concerns, holistically addressed patient's health plan.        Acute gout of right hand, unspecified cause  -     predniSONE (DELTASONE) 10 MG tablet; 40 mg x 3 days then 20mg x 3 days then 10 mg until complete  Home care  During a gout attack:  · Rest painful joints. If gout affects the joints of your foot or leg, you may want to use crutches for the first few days to keep from bearing weight on the affected  joint.  · When sitting or lying down, raise the painful joint to a level higher than your heart.  · Apply an ice pack (ice cubes in a plastic bag wrapped in a thin towel) over the injured area for 20 minutes every 1 to 2 hours the first day for pain relief. Continue this 3 to 4 times a day for swelling and pain.  · Avoid alcohol and foods listed below (see Preventing attacks) during a gout attack. Drink extra fluid to help flush the uric acid through your kidneys.  · If you were prescribed a medicine to treat gout, take it as your healthcare provider has instructed. Don't skip doses.  · Take anti-inflammatory medicine as directed.   · If pain medicines have been prescribed, take them exactly as directed.    Preventing attacks  · Minimize or avoid alcohol use. Excess alcohol intake can cause a gout attack.  · Limit these foods and beverages:  ¨ Organ meats, such as kidneys and liver  ¨ Certain seafoods (anchovies, sardines, shrimp, scallops, herring, mackerel)  ¨ Wild game, meat extracts and meat gravies  ¨ Foods and beverages sweetened with high-fructose corn syrup, such as sodas  · Eat a healthy diet including low-fat and nonfat dairy, whole grains, and vegetables.  · If you are overweight, talk to your healthcare provider about a weight reduction plan. Avoid fasting or extreme low calorie diets (less than 900 calories per day). This will increase uric acid levels in the body.  · If you have diabetes or high blood pressure, work with your doctor to manage these conditions.  · Protect the joint from injury. Trauma can trigger a gout attack.  Follow-up care  Follow up with your healthcare provider, or as advised.   When to seek medical advice  Call your healthcare provider if you have any of the following:  · Fever over 100.4°F (38.ºC) with worsening joint pain  · Increasing redness around the joint  · Pain developing in another joint  · Repeated vomiting, abdominal pain, or blood in the vomit or stool (black or red  color)  Date Last Reviewed: 3/1/2017  © 1424-8247 [employer]. [address] [place] [address], [place] [address]. All rights reserved. This information is not intended as a substitute for professional medical care. Always follow your healthcare professional's instructions.          Acute renal failure, unspecified acute renal failure type  The current medical regimen is effective;  continue present plan and medications.    Essential (primary) hypertension  Discussed sodium restriction, maintaining ideal body weight and regular exercise program as physiologic means to achieve blood pressure control. The patient will strive towards this. The current medical regimen is effective; continue present plan and medications. Recommended patient to check home readings to monitor. Patient was educated that both decongestant and anti-inflammatory medication may raise blood pressure

## 2018-11-19 DIAGNOSIS — M10.9 ACUTE GOUT OF RIGHT HAND, UNSPECIFIED CAUSE: ICD-10-CM

## 2018-11-19 RX ORDER — PREDNISONE 10 MG/1
TABLET ORAL
Qty: 21 TABLET | Refills: 0 | OUTPATIENT
Start: 2018-11-19

## 2018-11-19 NOTE — TELEPHONE ENCOUNTER
----- Message from Eunice White sent at 11/19/2018 12:45 PM CST -----  Contact: self - [phone]  Pt would like to know why ---predniSONE (DELTASONE) 10 MG tablet--- was denied. Stating refill not appropriate. He would like to know if he needs to make an appt to come in.

## 2018-11-20 ENCOUNTER — OFFICE VISIT (OUTPATIENT)
Dept: FAMILY MEDICINE | Facility: CLINIC | Age: 58
End: 2018-11-20
Payer: COMMERCIAL

## 2018-11-20 VITALS
BODY MASS INDEX: 19.95 KG/M2 | HEIGHT: 66 IN | HEART RATE: 119 BPM | DIASTOLIC BLOOD PRESSURE: 100 MMHG | SYSTOLIC BLOOD PRESSURE: 184 MMHG | OXYGEN SATURATION: 96 % | TEMPERATURE: 99 F | WEIGHT: 124.13 LBS

## 2018-11-20 DIAGNOSIS — M10.9 ACUTE GOUT OF RIGHT WRIST, UNSPECIFIED CAUSE: Primary | ICD-10-CM

## 2018-11-20 DIAGNOSIS — I10 ESSENTIAL (PRIMARY) HYPERTENSION: Chronic | ICD-10-CM

## 2018-11-20 PROCEDURE — 99214 OFFICE O/P EST MOD 30 MIN: CPT | Mod: S$GLB,,, | Performed by: NURSE PRACTITIONER

## 2018-11-20 PROCEDURE — 99999 PR PBB SHADOW E&M-EST. PATIENT-LVL IV: CPT | Mod: PBBFAC,,, | Performed by: NURSE PRACTITIONER

## 2018-11-20 PROCEDURE — 3008F BODY MASS INDEX DOCD: CPT | Mod: CPTII,S$GLB,, | Performed by: NURSE PRACTITIONER

## 2018-11-20 PROCEDURE — 3077F SYST BP >= 140 MM HG: CPT | Mod: CPTII,S$GLB,, | Performed by: NURSE PRACTITIONER

## 2018-11-20 PROCEDURE — 3080F DIAST BP >= 90 MM HG: CPT | Mod: CPTII,S$GLB,, | Performed by: NURSE PRACTITIONER

## 2018-11-20 RX ORDER — OMEPRAZOLE 40 MG/1
CAPSULE, DELAYED RELEASE ORAL
Refills: 1 | COMMUNITY
Start: 2018-11-13 | End: 2019-04-11

## 2018-11-20 RX ORDER — PREDNISONE 20 MG/1
TABLET ORAL
Qty: 18 TABLET | Refills: 0 | Status: SHIPPED | OUTPATIENT
Start: 2018-11-20 | End: 2018-12-14

## 2018-11-20 NOTE — PROGRESS NOTES
Subjective:       Patient ID: Duran Mccauley is a 58 y.o. male.    Chief Complaint: Gout (in right hand)    Patient is a 58  year old  Male known to me who presented to clinic with complaints of Flare up gout in right hand x 2 days.  Symptoms are present in the right hand.  He is right-hand dominant.  He has right hand and wrist pain and swelling.  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 The incident occurred at home. There was no injury mechanism. The pain is present in the right hand. The quality of the pain is described as aching. The pain does not radiate. The pain is at a severity of 7/10. The pain is moderate. The pain has been worsening since the incident. Pertinent negatives include no chest pain, muscle weakness, numbness or tingling. Associated symptoms comments: Right hand pain and swelling  . The symptoms are aggravated by movement, lifting and palpation. He has tried nothing for the symptoms.     Review of Systems   Constitutional: Negative for chills, diaphoresis, fatigue and fever.   HENT: Negative for congestion, postnasal drip, rhinorrhea, sinus pressure and sneezing.    Respiratory: Negative for cough, chest tightness and shortness of breath.    Cardiovascular: Negative for chest pain, palpitations and leg swelling.   Gastrointestinal: Negative for abdominal pain, diarrhea, nausea and vomiting.   Genitourinary: Negative for decreased urine volume and difficulty urinating.   Musculoskeletal: Positive for arthralgias and joint swelling (right hand ). Negative for back pain and myalgias.   Skin: Negative for color change and rash.   Neurological: Negative for dizziness, tingling, weakness, light-headedness, numbness and headaches.   Hematological: Negative for adenopathy. Does not bruise/bleed easily.       Objective:      Physical Exam   Constitutional: He is oriented to person, place, and time. Vital signs are normal. He appears  well-developed and well-nourished.   HENT:   Head: Normocephalic and atraumatic.   Right Ear: External ear normal.   Left Ear: External ear normal.   Nose: Nose normal.   Mouth/Throat: Oropharynx is clear and moist. No oropharyngeal exudate.   Cardiovascular: Normal rate, regular rhythm and normal heart sounds.   Pulmonary/Chest: Effort normal and breath sounds normal.   Abdominal: Soft. Bowel sounds are normal.   Musculoskeletal:        Right wrist: He exhibits decreased range of motion, tenderness and swelling. He exhibits no bony tenderness, no effusion, no crepitus, no deformity and no laceration.        Right hand: He exhibits decreased range of motion, tenderness and swelling. He exhibits no bony tenderness, normal two-point discrimination, normal capillary refill, no deformity and no laceration.   Swelling and localized warmth noted back of the right hand.  There is some color changes observed.  Warmth palpable and tenderness to palpation.  Decreased range of motion due to pain.  No bruising observed.      Neurological: He is alert and oriented to person, place, and time.   Skin: Skin is warm, dry and intact.   Psychiatric: He has a normal mood and affect.       Assessment:       1. Acute gout of right wrist, unspecified cause    2. Essential (primary) hypertension        Plan:       Duran was seen today for gout.    Diagnoses and all orders for this visit:    Acute gout of right wrist, unspecified cause  -     predniSONE (DELTASONE) 20 MG tablet; 60 mg x 3 days then 4 0mg x 3 days the 20 mg until complete    Essential (primary) hypertension  The current medical regimen is effective;  continue present plan and medications.      Home care  During a gout attack:  · Rest painful joints. If gout affects the joints of your foot or leg, you may want to use crutches for the first few days to keep from bearing weight on the affected joint.  · When sitting or lying down, raise the painful joint to a level higher  than your heart.  · Apply an ice pack (ice cubes in a plastic bag wrapped in a thin towel) over the injured area for 20 minutes every 1 to 2 hours the first day for pain relief. Continue this 3 to 4 times a day for swelling and pain.  · Avoid alcohol and foods listed below (see Preventing attacks) during a gout attack. Drink extra fluid to help flush the uric acid through your kidneys.  If you were prescribed a medicine to treat gout, take it Dictation #1  MRN:[id number]  [facility]:[id number]  · as your healthcare provider has instructed. Don't skip doses.  · Take anti-inflammatory medicine as directed.   · If pain medicines have been prescribed, take them exactly as directed.    Preventing attacks  · Minimize or avoid alcohol use. Excess alcohol intake can cause a gout attack.  · Limit these foods and beverages:  ¨ Organ meats, such as kidneys and liver  ¨ Certain seafoods (anchovies, sardines, shrimp, scallops, herring, mackerel)  ¨ Wild game, meat extracts and meat gravies  ¨ Foods and beverages sweetened with high-fructose corn syrup, such as sodas  · Eat a healthy diet including low-fat and nonfat dairy, whole grains, and vegetables.  · If you are overweight, talk to your healthcare provider about a weight reduction plan. Avoid fasting or extreme low calorie diets (less than 900 calories per day). This will increase uric acid levels in the body.  · If you have diabetes or high blood pressure, work with your doctor to manage these conditions.  · Protect the joint from injury. Trauma can trigger a gout attack.  Follow-up care  Follow up with your healthcare provider, or as advised.   When to seek medical advice  Call your healthcare provider if you have any of the following:  · Fever over 100.4°F (38.ºC) with worsening joint pain  · Increasing redness around the joint  · Pain developing in another joint  · Repeated vomiting, abdominal pain, or blood in the vomit or stool (black or red color)  Date Last Reviewed:  3/1/2017  © 2032-4793 [employer]. [address] [place] [address], [place] [address]. All rights reserved. This information is not intended as a substitute for professional medical care. Always follow your healthcare professional's instructions.

## 2018-11-20 NOTE — PATIENT INSTRUCTIONS
Gout    Gout is an inflammation of a joint due to a build-up of gout crystals in the joint fluid. This occurs when there is an excess of uric acid (a normal waste product) in the body. Uric acid builds up in the body when the kidneys are unable to filter enough of it from the blood. This may occur with age. It is also associated with kidney disease. Gout occurs more often in people with obesity, diabetes, high blood pressure, or high levels of fats in the blood. It may run in families. Gout tends to come and go. A flare up of gout is called an attack. Drinking alcohol or eating certain foods (such as shellfish or foods with additives such as high-fructose corn syrup) may increase uric acid levels in the blood and cause a gout attack.  During a gout attack, the affected joint may become a hot, red, swollen and painful. If you have had one attack of gout, you are likely to have another. An attack of gout can be treated with medicine. If these attacks become frequent, a daily medicine may be prescribed to help the kidneys remove uric acid from the body.  Home care  During a gout attack:  · Rest painful joints. If gout affects the joints of your foot or leg, you may want to use crutches for the first few days to keep from bearing weight on the affected joint.  · When sitting or lying down, raise the painful joint to a level higher than your heart.  · Apply an ice pack (ice cubes in a plastic bag wrapped in a thin towel) over the injured area for 20 minutes every 1 to 2 hours the first day for pain relief. Continue this 3 to 4 times a day for swelling and pain.  · Avoid alcohol and foods listed below (see Preventing attacks) during a gout attack. Drink extra fluid to help flush the uric acid through your kidneys.  · If you were prescribed a medicine to treat gout, take it as your healthcare provider has instructed. Don't skip doses.  · Take anti-inflammatory medicine as directed.   · If pain medicines have been  prescribed, take them exactly as directed.    Preventing attacks  · Minimize or avoid alcohol use. Excess alcohol intake can cause a gout attack.  · Limit these foods and beverages:  ¨ Organ meats, such as kidneys and liver  ¨ Certain seafoods (anchovies, sardines, shrimp, scallops, herring, mackerel)  ¨ Wild game, meat extracts and meat gravies  ¨ Foods and beverages sweetened with high-fructose corn syrup, such as sodas  · Eat a healthy diet including low-fat and nonfat dairy, whole grains, and vegetables.  · If you are overweight, talk to your healthcare provider about a weight reduction plan. Avoid fasting or extreme low calorie diets (less than 900 calories per day). This will increase uric acid levels in the body.  · If you have diabetes or high blood pressure, work with your doctor to manage these conditions.  · Protect the joint from injury. Trauma can trigger a gout attack.  Follow-up care  Follow up with your healthcare provider, or as advised.   When to seek medical advice  Call your healthcare provider if you have any of the following:  · Fever over 100.4°F (38.ºC) with worsening joint pain  · Increasing redness around the joint  · Pain developing in another joint  · Repeated vomiting, abdominal pain, or blood in the vomit or stool (black or red color)  Date Last Reviewed: 3/1/2017  © 8539-0023 [employer]. [address] [place] [address]. All rights reserved. This information is not intended as a substitute for professional medical care. Always follow your healthcare professional's instructions.        Gout Diet  Gout is a painful condition caused by an excess of uric acid, a waste product made by the body. Uric acid forms crystals that collect in the joints. The immune response to these crystals brings on symptoms of joint pain and swelling. This is called a gout attack. Often, medications and diet changes are combined to manage gout. Below are some guidelines for changing your  diet to help you manage gout and prevent attacks. Your health care provider will help you determine the best eating plan for you.     Eating to manage gout  Weight loss for those who are overweight may help reduce gout attacks.  Eat less of these foods  Eating too many foods containing purines may raise the levels of uric acid in your body. This raises your risk for a gout attack. Try to limit these foods and drinks:  · Alcohol, such as beer and red wine. You may be told to avoid alcohol completely.  · Soft drinks that contain sugar or high fructose corn syrup  · Certain fish, including anchovies, sardines, fish eggs, and herring  · Shellfish  · Certain meats, such as red meat, hot dogs, luncheon meats, and turkey  · Organ meats, such as liver, kidneys, and sweetbreads  · Legumes, such as dried beans and peas  · Other high fat foods such as gravy, whole milk, and high fat cheeses  · Vegetables such as asparagus, cauliflower, spinach, and mushrooms used to be thought to contribute to an increased risk for a gout attack, but recent studies show that high purine vegetables don't increase the risk for a gout attack.  Eat more of these foods  Other foods may be helpful for people with gout. Add some of these foods to your diet:  · Cherries contain chemicals that may lower uric acid.  · Omega fatty acids. These are found in some fatty fish such as salmon, certain oils (flax, olive, or nut), and nuts themselves. Omega fatty acids may help prevent inflammation due to gout.  · Dairy products that are low-fat or fat-free, such as cheese and yogurt  · Complex carbohydrate foods, including whole grains, brown rice, oats, and beans  · Coffee, in moderation  · Water, approximately 64 ounces per day  Follow-up care  Follow up with your healthcare provider as advised.  When to seek medical advice  Call your healthcare provider right away if any of these occur:  · Return of gout symptoms, usually at night:  · Severe pain, swelling,  and heat in a joint, especially the base of the big toe  · Affected joint is hard to move  · Skin of the affected joint is purple or red  · Fever of 100.4°F (38°C) or higher  · Pain that doesn't get better even with prescribed medicine   Date Last Reviewed: 1/12/2016  © 9514-1807 [employer]. [address] [place] [address]. All rights reserved. This information is not intended as a substitute for professional medical care. Always follow your healthcare professional's instructions.

## 2018-11-25 ENCOUNTER — HOSPITAL ENCOUNTER (OUTPATIENT)
Facility: HOSPITAL | Age: 58
Discharge: HOME | End: 2018-11-26
Attending: EMERGENCY MEDICINE | Admitting: EMERGENCY MEDICINE
Payer: COMMERCIAL

## 2018-11-25 DIAGNOSIS — R06.02 SHORTNESS OF BREATH: ICD-10-CM

## 2018-11-25 DIAGNOSIS — R06.01 ORTHOPNEA: ICD-10-CM

## 2018-11-25 DIAGNOSIS — R06.09 DYSPNEA ON EXERTION: Primary | ICD-10-CM

## 2018-11-25 DIAGNOSIS — N18.6 ESRD ON HEMODIALYSIS: Chronic | ICD-10-CM

## 2018-11-25 DIAGNOSIS — D72.829 LEUKOCYTOSIS: ICD-10-CM

## 2018-11-25 DIAGNOSIS — Z99.2 ESRD ON HEMODIALYSIS: Chronic | ICD-10-CM

## 2018-11-25 DIAGNOSIS — E78.00 PURE HYPERCHOLESTEROLEMIA: Chronic | ICD-10-CM

## 2018-11-25 DIAGNOSIS — R79.89 ELEVATED TROPONIN LEVEL: ICD-10-CM

## 2018-11-25 DIAGNOSIS — I10 ESSENTIAL (PRIMARY) HYPERTENSION: Chronic | ICD-10-CM

## 2018-11-25 LAB
ALBUMIN SERPL BCP-MCNC: 3.6 G/DL
ALP SERPL-CCNC: 80 U/L
ALT SERPL W/O P-5'-P-CCNC: 21 U/L
ANION GAP SERPL CALC-SCNC: 14 MMOL/L
ANISOCYTOSIS BLD QL SMEAR: ABNORMAL
AST SERPL-CCNC: 16 U/L
BASOPHILS # BLD AUTO: 0.02 K/UL
BASOPHILS NFR BLD: 0.1 %
BILIRUB SERPL-MCNC: 1.5 MG/DL
BNP SERPL-MCNC: 823 PG/ML
BUN SERPL-MCNC: 69 MG/DL
CALCIUM SERPL-MCNC: 9.1 MG/DL
CHLORIDE SERPL-SCNC: 103 MMOL/L
CO2 SERPL-SCNC: 21 MMOL/L
CREAT SERPL-MCNC: 11.1 MG/DL
DACRYOCYTES BLD QL SMEAR: ABNORMAL
DIFFERENTIAL METHOD: ABNORMAL
EOSINOPHIL # BLD AUTO: 0 K/UL
EOSINOPHIL NFR BLD: 0.2 %
ERYTHROCYTE [DISTWIDTH] IN BLOOD BY AUTOMATED COUNT: 18.4 %
EST. GFR  (AFRICAN AMERICAN): 5 ML/MIN/1.73 M^2
EST. GFR  (NON AFRICAN AMERICAN): 4 ML/MIN/1.73 M^2
GLUCOSE SERPL-MCNC: 118 MG/DL
HCT VFR BLD AUTO: 26.6 %
HGB BLD-MCNC: 9.1 G/DL
LACTATE SERPL-SCNC: 1.8 MMOL/L
LYMPHOCYTES # BLD AUTO: 2 K/UL
LYMPHOCYTES NFR BLD: 11.2 %
MCH RBC QN AUTO: 29.4 PG
MCHC RBC AUTO-ENTMCNC: 34.2 G/DL
MCV RBC AUTO: 86 FL
MONOCYTES # BLD AUTO: 1.4 K/UL
MONOCYTES NFR BLD: 7.6 %
NEUTROPHILS # BLD AUTO: 14.6 K/UL
NEUTROPHILS NFR BLD: 81.8 %
PLATELET # BLD AUTO: 158 K/UL
PMV BLD AUTO: 9.2 FL
POLYCHROMASIA BLD QL SMEAR: ABNORMAL
POTASSIUM SERPL-SCNC: 4.9 MMOL/L
PROT SERPL-MCNC: 6.4 G/DL
RBC # BLD AUTO: 3.1 M/UL
SODIUM SERPL-SCNC: 138 MMOL/L
TROPONIN I SERPL DL<=0.01 NG/ML-MCNC: 0.1 NG/ML
TROPONIN I SERPL DL<=0.01 NG/ML-MCNC: 0.1 NG/ML
WBC # BLD AUTO: 18 K/UL

## 2018-11-25 PROCEDURE — 96375 TX/PRO/DX INJ NEW DRUG ADDON: CPT

## 2018-11-25 PROCEDURE — 85025 COMPLETE CBC W/AUTO DIFF WBC: CPT

## 2018-11-25 PROCEDURE — 83880 ASSAY OF NATRIURETIC PEPTIDE: CPT

## 2018-11-25 PROCEDURE — G0378 HOSPITAL OBSERVATION PER HR: HCPCS

## 2018-11-25 PROCEDURE — 93005 ELECTROCARDIOGRAM TRACING: CPT

## 2018-11-25 PROCEDURE — 96374 THER/PROPH/DIAG INJ IV PUSH: CPT

## 2018-11-25 PROCEDURE — 84484 ASSAY OF TROPONIN QUANT: CPT | Mod: 91

## 2018-11-25 PROCEDURE — 36415 COLL VENOUS BLD VENIPUNCTURE: CPT

## 2018-11-25 PROCEDURE — 93010 ELECTROCARDIOGRAM REPORT: CPT | Mod: ,,, | Performed by: INTERNAL MEDICINE

## 2018-11-25 PROCEDURE — 84484 ASSAY OF TROPONIN QUANT: CPT

## 2018-11-25 PROCEDURE — 99285 EMERGENCY DEPT VISIT HI MDM: CPT

## 2018-11-25 PROCEDURE — 25000003 PHARM REV CODE 250: Performed by: HOSPITALIST

## 2018-11-25 PROCEDURE — 63600175 PHARM REV CODE 636 W HCPCS: Performed by: HOSPITALIST

## 2018-11-25 PROCEDURE — 96372 THER/PROPH/DIAG INJ SC/IM: CPT

## 2018-11-25 PROCEDURE — 80053 COMPREHEN METABOLIC PANEL: CPT

## 2018-11-25 PROCEDURE — 25000003 PHARM REV CODE 250: Performed by: EMERGENCY MEDICINE

## 2018-11-25 PROCEDURE — 83605 ASSAY OF LACTIC ACID: CPT

## 2018-11-25 PROCEDURE — 94761 N-INVAS EAR/PLS OXIMETRY MLT: CPT

## 2018-11-25 RX ORDER — RAMELTEON 8 MG/1
8 TABLET ORAL NIGHTLY PRN
Status: DISCONTINUED | OUTPATIENT
Start: 2018-11-25 | End: 2018-11-27 | Stop reason: HOSPADM

## 2018-11-25 RX ORDER — CLONIDINE HYDROCHLORIDE 0.1 MG/1
0.1 TABLET ORAL EVERY 6 HOURS PRN
Status: DISCONTINUED | OUTPATIENT
Start: 2018-11-25 | End: 2018-11-26

## 2018-11-25 RX ORDER — ATORVASTATIN CALCIUM 10 MG/1
20 TABLET, FILM COATED ORAL DAILY
Status: DISCONTINUED | OUTPATIENT
Start: 2018-11-26 | End: 2018-11-27 | Stop reason: HOSPADM

## 2018-11-25 RX ORDER — ACETAMINOPHEN 325 MG/1
650 TABLET ORAL EVERY 6 HOURS PRN
Status: DISCONTINUED | OUTPATIENT
Start: 2018-11-25 | End: 2018-11-27 | Stop reason: HOSPADM

## 2018-11-25 RX ORDER — PANTOPRAZOLE SODIUM 40 MG/1
40 TABLET, DELAYED RELEASE ORAL DAILY
Status: DISCONTINUED | OUTPATIENT
Start: 2018-11-26 | End: 2018-11-27 | Stop reason: HOSPADM

## 2018-11-25 RX ORDER — AMOXICILLIN 250 MG
1 CAPSULE ORAL 2 TIMES DAILY
Status: DISCONTINUED | OUTPATIENT
Start: 2018-11-25 | End: 2018-11-27 | Stop reason: HOSPADM

## 2018-11-25 RX ORDER — AMLODIPINE BESYLATE 5 MG/1
10 TABLET ORAL DAILY
Status: DISCONTINUED | OUTPATIENT
Start: 2018-11-26 | End: 2018-11-26

## 2018-11-25 RX ORDER — HEPARIN SODIUM 5000 [USP'U]/ML
5000 INJECTION, SOLUTION INTRAVENOUS; SUBCUTANEOUS EVERY 8 HOURS
Status: DISCONTINUED | OUTPATIENT
Start: 2018-11-25 | End: 2018-11-27 | Stop reason: HOSPADM

## 2018-11-25 RX ADMIN — STANDARDIZED SENNA CONCENTRATE AND DOCUSATE SODIUM 1 TABLET: 8.6; 5 TABLET, FILM COATED ORAL at 09:11

## 2018-11-25 RX ADMIN — HEPARIN SODIUM 5000 UNITS: 5000 INJECTION, SOLUTION INTRAVENOUS; SUBCUTANEOUS at 09:11

## 2018-11-25 RX ADMIN — CLONIDINE HYDROCHLORIDE 0.1 MG: 0.1 TABLET ORAL at 11:11

## 2018-11-25 NOTE — ED PROVIDER NOTES
"Encounter Date: 11/25/2018    This is a SORT/MSE of a 58 y.o. male presenting to the ED with c/o shortness of breath and generalized weakness that began 2 days ago, improved after dialysis on Friday, returned today. AAO, REU, SPO2 100% on RA and  BPM. Care will be transferred to an alternate provider when patient is roomed for a full evaluation and final disposition. TRAV Ford, FNP-C 11/25/2018 3:09 PM    SCRIBE #1 NOTE: I, Christy Grant, am scribing for, and in the presence of,  Antoine Posada Jr., MD. I have scribed the following portions of the note - Other sections scribed: HPI and ROS.       History     Chief Complaint   Patient presents with    Shortness of Breath     " feeling weak and i had breathing problems at home, can't walk to far, i have to stop, haven't eaten in two days" c/o weakness x 2 days, SOBx 3 days ago, reports SOB was improved with dialysis 2 days ago, SOB returned today      CC: Shortness of Breath    HPI: This 58 y.o. Male with PMHx of dialysis patient (Mon, Wed, Fri) , Eczema, Hepatitis B, Hyperlipidemia, and Hypertension presents to the ED c/o 3 day hx of shortness of breath upon exertion. SOB is exacerbated when laying flat. Pt states he had dialysis 2 days ago, and it improved his shortness of breath. Pt was told he has fluid overload. Pt reports fever earlier today. Pt also reports mild abdominal pain and vomiting. Pt denies any recent long periods of immobility. Pt also denies a hx of DM and CHF. In addition, pt denies chest pain, hematochezia, melena, and any other associated symptoms.         The history is provided by the patient and the spouse. No  was used.     Review of patient's allergies indicates:  No Known Allergies  Past Medical History:   Diagnosis Date    Dialysis patient     MWF    Eczema     Hepatitis B     treated 2010s and negative RNA load afterward    Hyperlipidemia     Hypertension      Past Surgical History:   Procedure " Laterality Date    COLONOSCOPY N/A 9/14/2018    Procedure: COLONOSCOPY;  Surgeon: Viri Blair MD;  Location: [facility] ENDO;  Service: Endoscopy;  Laterality: N/A;    COLONOSCOPY N/A 9/14/2018    Performed by Viri Blair MD at [facility] ENDO    TUNNELED VENOUS CATHETER PLACEMENT      HD cath     Family History   Problem Relation Age of Onset    Heart disease Father     Diabetes Brother      Social History     Tobacco Use    Smoking status: Never Smoker    Smokeless tobacco: Never Used   Substance Use Topics    Alcohol use: Yes     Alcohol/week: 0.6 oz     Types: 1 Cans of beer per week    Drug use: No     Review of Systems   Constitutional: Positive for fever (since resolved). Negative for chills and diaphoresis.   HENT: Negative for ear pain.    Eyes: Negative for pain.   Respiratory: Positive for shortness of breath.    Cardiovascular: Negative for chest pain.   Gastrointestinal: Positive for abdominal pain (mild) and vomiting. Negative for blood in stool and diarrhea.        (-) Melena   Genitourinary: Negative for dysuria.   Musculoskeletal: Negative for back pain.   Skin: Negative for rash.   Neurological: Negative for headaches.       Physical Exam     Initial Vitals [11/25/18 1509]   BP Pulse Resp Temp SpO2   (!) 174/105 (!) 121 (!) 24 98.2 °F (36.8 °C) 100 %      MAP       --         Physical Exam    Nursing note and vitals reviewed.  Constitutional: He appears well-developed and well-nourished. He is not diaphoretic. No distress.   HENT:   Head: Normocephalic.   Right Ear: External ear normal.   Left Ear: External ear normal.   Nose: Nose normal.   Mouth/Throat: Oropharynx is clear and moist.   Eyes: Conjunctivae and EOM are normal. Pupils are equal, round, and reactive to light. Right eye exhibits no discharge. Left eye exhibits no discharge. No scleral icterus.   Neck: Normal range of motion. Neck supple. No JVD present.   Cardiovascular: Regular rhythm, normal heart sounds and intact distal pulses.  Exam reveals no gallop and no friction rub.    No murmur heard.  Tachycardic, regular rhythm   Pulmonary/Chest: Breath sounds normal. No stridor. No respiratory distress. He has no wheezes. He has no rhonchi. He has no rales. He exhibits no tenderness.   No significant respiratory distress.  Lungs clear bilaterally.   Abdominal: Soft. Bowel sounds are normal. He exhibits no distension and no mass. There is no tenderness. There is no rebound and no guarding.   Musculoskeletal: Normal range of motion. He exhibits no edema or tenderness.   Neurological: He is alert and oriented to person, place, and time. He has normal strength. No cranial nerve deficit or sensory deficit.   Skin: Skin is warm and dry. No rash noted. No erythema. No pallor.   Psychiatric: He has a normal mood and affect. His behavior is normal. Judgment and thought content normal.         ED Course   Procedures  Labs Reviewed   CBC W/ AUTO DIFFERENTIAL - Abnormal; Notable for the following components:       Result Value    WBC 18.00 (*)     RBC 3.10 (*)     Hemoglobin 9.1 (*)     Hematocrit 26.6 (*)     RDW 18.4 (*)     All other components within normal limits   COMPREHENSIVE METABOLIC PANEL - Abnormal; Notable for the following components:    CO2 21 (*)     Glucose 118 (*)     BUN, Bld 69 (*)     Creatinine 11.1 (*)     Total Bilirubin 1.5 (*)     eGFR if  5 (*)     eGFR if non  4 (*)     All other components within normal limits   TROPONIN I - Abnormal; Notable for the following components:    Troponin I 0.097 (*)     All other components within normal limits   B-TYPE NATRIURETIC PEPTIDE - Abnormal; Notable for the following components:     (*)     All other components within normal limits   LACTIC ACID, PLASMA     EKG Readings: (Independently Interpreted)   Initial Reading: No STEMI.   EKG reviewed and interpreted by me shows sinus tachycardia rate of 122.  P.r., QRS and QT intervals within normal limits with  a normal axis.  There is poor R-wave progression across the precordium.  There are no ST segment or T-wave ischemic findings.         Imaging Results          X-Ray Chest PA And Lateral (Final result)  Result time 11/25/18 15:54:09    Final result by Roberto Bruno MD (11/25/18 15:54:09)                 Impression:      Small bilateral pleural effusions with adjacent atelectasis.      Electronically signed by: Roberto Bruno MD  Date:    11/25/2018  Time:    15:54             Narrative:    EXAMINATION:  XR CHEST PA AND LATERAL    CLINICAL HISTORY:  Shortness of breath;    TECHNIQUE:  PA and lateral views of the chest were performed.    COMPARISON:  None    FINDINGS:  Right-sided dialysis catheter present.  Cardiac silhouette and mediastinal contours within normal limits.  Small bilateral pleural effusions present with adjacent atelectasis.  Upper lungs clear.                              X-Rays:   Independently Interpreted Readings:   Other Readings:  Chest x-ray reviewed by me reveals bilateral small pleural effusions without evidence pulmonary edema, infiltrate, or consolidation.    Medical Decision Making:   ED Management:  This is the emergent evaluation of a 58-year-old male presents emergency department for evaluation dyspnea on exertion and orthopnea which has been present for 3 days.  Patient states symptoms briefly got better after hemodialysis 2 days ago but then returned yesterday evening.  Differential diagnosis at the time of initial evaluation included, but was not limited to:  Fluid overload from insufficient dialysis, new onset CHF, hypertensive crisis, metabolic disturbance, pneumonia, pneumothorax.  I also considered pulmonary embolus.       Patient's chest x-ray was unremarkable for significant pulmonary edema.  There were small pleural effusions bilaterally. No evidence of pneumonia.  The patient does have a leukocytosis but is afebrile and has been on prednisone which is likely the  etiology of this leukocytosis.  Patient's troponin is mildly elevated.  Patient should be admitted for rule out of acute coronary syndrome.  Additionally, V/Q scan was ordered for evaluation for possible pulmonary embolus given tachycardia and clear chest x-ray.  I have consult Nephrology for possible hemodialysis tomorrow as per the patient's schedule.  Have discussed the case with the admitting ELIEL.                Scribe Attestation:   Scribe #1: I performed the above scribed service and the documentation accurately describes the services I performed. I attest to the accuracy of the note.    Attending Attestation:           Physician Attestation for Scribe:  Physician Attestation Statement for Scribe #1: I, Antoine Posada Jr., MD, reviewed documentation, as scribed by Christy Grant in my presence, and it is both accurate and complete.                    Clinical Impression:   The primary encounter diagnosis was Dyspnea on exertion. Diagnoses of Shortness of breath and Orthopnea were also pertinent to this visit.                             Antoine Posada Jr., MD  11/25/18 1262       Antoine Posada Jr., MD  11/25/18 9961

## 2018-11-25 NOTE — ED TRIAGE NOTES
Pt reports increasing shortness of breath since this morning, last dialysis was Friday. States he felt better after dialysis but then woke up this morning short of breath

## 2018-11-26 VITALS
OXYGEN SATURATION: 99 % | BODY MASS INDEX: 19.38 KG/M2 | DIASTOLIC BLOOD PRESSURE: 90 MMHG | RESPIRATION RATE: 18 BRPM | SYSTOLIC BLOOD PRESSURE: 138 MMHG | HEART RATE: 111 BPM | WEIGHT: 120.56 LBS | HEIGHT: 66 IN | TEMPERATURE: 99 F

## 2018-11-26 LAB
AORTIC ROOT ANNULUS: 3.12 CM
AORTIC VALVE CUSP SEPERATION: 2.44 CM
ASCENDING AORTA: 3.15 CM
AV INDEX (PROSTH): 0.97
AV MEAN GRADIENT: 4.82 MMHG
AV PEAK GRADIENT: 7.62 MMHG
AV VALVE AREA: 4.19 CM2
BSA FOR ECHO PROCEDURE: 1.55 M2
CV ECHO LV RWT: 0.5 CM
DOP CALC AO PEAK VEL: 1.38 M/S
DOP CALC AO VTI: 21.77 CM
DOP CALC LVOT AREA: 4.34 CM2
DOP CALC LVOT DIAMETER: 2.35 CM
DOP CALC LVOT STROKE VOLUME: 91.17 CM3
DOP CALCLVOT PEAK VEL VTI: 21.03 CM
ECHO LV POSTERIOR WALL: 1.09 CM (ref 0.6–1.1)
FRACTIONAL SHORTENING: 42 % (ref 28–44)
INTERVENTRICULAR SEPTUM: 1.03 CM (ref 0.6–1.1)
IVRT: 0.09 MSEC
LA MAJOR: 4.65 CM
LEFT ATRIUM SIZE: 4.13 CM
LEFT INTERNAL DIMENSION IN SYSTOLE: 2.52 CM (ref 2.1–4)
LEFT VENTRICLE DIASTOLIC VOLUME INDEX: 54.83 ML/M2
LEFT VENTRICLE DIASTOLIC VOLUME: 84.98 ML
LEFT VENTRICLE MASS INDEX: 101.2 G/M2
LEFT VENTRICLE SYSTOLIC VOLUME INDEX: 14.7 ML/M2
LEFT VENTRICLE SYSTOLIC VOLUME: 22.78 ML
LEFT VENTRICULAR INTERNAL DIMENSION IN DIASTOLE: 4.34 CM (ref 3.5–6)
LEFT VENTRICULAR MASS: 156.88 G
PISA TR MAX VEL: 3.85 M/S
PV PEAK VELOCITY: 1.86 CM/S
RA MAJOR: 4.47 CM
RA PRESSURE: 3 MMHG
RA WIDTH: 2.76 CM
RIGHT VENTRICULAR END-DIASTOLIC DIMENSION: 3.14 CM
RV TISSUE DOPPLER FREE WALL SYSTOLIC VELOCITY 1 (APICAL 4 CHAMBER VIEW): 21.77 M/S
SINUS: 3.6 CM
STJ: 3.03 CM
TR MAX PG: 59.29 MMHG
TRICUSPID ANNULAR PLANE SYSTOLIC EXCURSION: 2.27 CM
TROPONIN I SERPL DL<=0.01 NG/ML-MCNC: 0.1 NG/ML
TV REST PULMONARY ARTERY PRESSURE: 62.29 MMHG

## 2018-11-26 PROCEDURE — 90935 HEMODIALYSIS ONE EVALUATION: CPT

## 2018-11-26 PROCEDURE — 36415 COLL VENOUS BLD VENIPUNCTURE: CPT

## 2018-11-26 PROCEDURE — 96372 THER/PROPH/DIAG INJ SC/IM: CPT | Mod: 59

## 2018-11-26 PROCEDURE — 25000003 PHARM REV CODE 250: Performed by: HOSPITALIST

## 2018-11-26 PROCEDURE — 96375 TX/PRO/DX INJ NEW DRUG ADDON: CPT

## 2018-11-26 PROCEDURE — 25000003 PHARM REV CODE 250: Performed by: INTERNAL MEDICINE

## 2018-11-26 PROCEDURE — 96374 THER/PROPH/DIAG INJ IV PUSH: CPT

## 2018-11-26 PROCEDURE — 86706 HEP B SURFACE ANTIBODY: CPT

## 2018-11-26 PROCEDURE — 80074 ACUTE HEPATITIS PANEL: CPT

## 2018-11-26 PROCEDURE — 63600175 PHARM REV CODE 636 W HCPCS: Performed by: HOSPITALIST

## 2018-11-26 PROCEDURE — 63600175 PHARM REV CODE 636 W HCPCS: Performed by: INTERNAL MEDICINE

## 2018-11-26 PROCEDURE — G0378 HOSPITAL OBSERVATION PER HR: HCPCS

## 2018-11-26 PROCEDURE — 84484 ASSAY OF TROPONIN QUANT: CPT

## 2018-11-26 PROCEDURE — 25000003 PHARM REV CODE 250: Performed by: EMERGENCY MEDICINE

## 2018-11-26 PROCEDURE — G0257 UNSCHED DIALYSIS ESRD PT HOS: HCPCS

## 2018-11-26 RX ORDER — OLMESARTAN MEDOXOMIL 20 MG/1
20 TABLET ORAL DAILY
Status: DISCONTINUED | OUTPATIENT
Start: 2018-11-26 | End: 2018-11-27 | Stop reason: HOSPADM

## 2018-11-26 RX ORDER — SODIUM CHLORIDE 9 MG/ML
INJECTION, SOLUTION INTRAVENOUS
Status: CANCELLED | OUTPATIENT
Start: 2018-11-26

## 2018-11-26 RX ORDER — METOPROLOL TARTRATE 1 MG/ML
5 INJECTION, SOLUTION INTRAVENOUS ONCE
Status: DISCONTINUED | OUTPATIENT
Start: 2018-11-26 | End: 2018-11-27 | Stop reason: HOSPADM

## 2018-11-26 RX ORDER — OLMESARTAN MEDOXOMIL 20 MG/1
20 TABLET ORAL DAILY
Qty: 90 TABLET | Refills: 0 | Status: SHIPPED | OUTPATIENT
Start: 2018-11-26 | End: 2019-06-25 | Stop reason: SDUPTHER

## 2018-11-26 RX ORDER — HEPARIN SODIUM 5000 [USP'U]/ML
5000 INJECTION, SOLUTION INTRAVENOUS; SUBCUTANEOUS
Status: DISCONTINUED | OUTPATIENT
Start: 2018-11-26 | End: 2018-11-27 | Stop reason: HOSPADM

## 2018-11-26 RX ORDER — METOPROLOL SUCCINATE 50 MG/1
50 TABLET, EXTENDED RELEASE ORAL 2 TIMES DAILY
Qty: 60 TABLET | Refills: 0 | Status: SHIPPED | OUTPATIENT
Start: 2018-11-26 | End: 2019-04-11

## 2018-11-26 RX ORDER — HYDRALAZINE HYDROCHLORIDE 20 MG/ML
10 INJECTION INTRAMUSCULAR; INTRAVENOUS EVERY 6 HOURS PRN
Status: DISCONTINUED | OUTPATIENT
Start: 2018-11-26 | End: 2018-11-27 | Stop reason: HOSPADM

## 2018-11-26 RX ORDER — METOPROLOL SUCCINATE 50 MG/1
50 TABLET, EXTENDED RELEASE ORAL 2 TIMES DAILY
Status: DISCONTINUED | OUTPATIENT
Start: 2018-11-26 | End: 2018-11-27 | Stop reason: HOSPADM

## 2018-11-26 RX ORDER — METOPROLOL TARTRATE 1 MG/ML
5 INJECTION, SOLUTION INTRAVENOUS EVERY 5 MIN PRN
Status: DISCONTINUED | OUTPATIENT
Start: 2018-11-26 | End: 2018-11-27 | Stop reason: HOSPADM

## 2018-11-26 RX ADMIN — HEPARIN SODIUM 5000 UNITS: 5000 INJECTION, SOLUTION INTRAVENOUS; SUBCUTANEOUS at 09:11

## 2018-11-26 RX ADMIN — ATORVASTATIN CALCIUM 20 MG: 10 TABLET, FILM COATED ORAL at 08:11

## 2018-11-26 RX ADMIN — HEPARIN SODIUM 5000 UNITS: 5000 INJECTION, SOLUTION INTRAVENOUS; SUBCUTANEOUS at 08:11

## 2018-11-26 RX ADMIN — PANTOPRAZOLE SODIUM 40 MG: 40 TABLET, DELAYED RELEASE ORAL at 08:11

## 2018-11-26 RX ADMIN — METOPROLOL TARTRATE 5 MG: 1 INJECTION, SOLUTION INTRAVENOUS at 05:11

## 2018-11-26 RX ADMIN — STANDARDIZED SENNA CONCENTRATE AND DOCUSATE SODIUM 1 TABLET: 8.6; 5 TABLET, FILM COATED ORAL at 08:11

## 2018-11-26 RX ADMIN — ERYTHROPOIETIN 10000 UNITS: 10000 INJECTION, SOLUTION INTRAVENOUS; SUBCUTANEOUS at 07:11

## 2018-11-26 RX ADMIN — METOPROLOL SUCCINATE 50 MG: 50 TABLET, EXTENDED RELEASE ORAL at 09:11

## 2018-11-26 RX ADMIN — HEPARIN SODIUM 5000 UNITS: 5000 INJECTION, SOLUTION INTRAVENOUS; SUBCUTANEOUS at 05:11

## 2018-11-26 RX ADMIN — HYDRALAZINE HYDROCHLORIDE 10 MG: 20 INJECTION INTRAMUSCULAR; INTRAVENOUS at 04:11

## 2018-11-26 RX ADMIN — STANDARDIZED SENNA CONCENTRATE AND DOCUSATE SODIUM 1 TABLET: 8.6; 5 TABLET, FILM COATED ORAL at 09:11

## 2018-11-26 NOTE — PLAN OF CARE
11/26/18 1426   Final Note   Assessment Type Final Discharge Note   Anticipated Discharge Disposition Home   What phone number can be called within the next 1-3 days to see how you are doing after discharge? ([phone])   Hospital Follow Up  Appt(s) scheduled? Yes   Discharge plans and expectations educations in teach back method with documentation complete? Yes   Right Care Referral Info   Post Acute Recommendation No Care   Follow up appointment scheduled via [employer] for pt with _.  All information (Date, time, location and contact info) placed in AVS and on DC sheet.  Information given and explained to pt. Pt also instructed to call 24-48 hours prior to scheduled time if rescheduling needed.  Patient instructed to bring all meds currently taking in their original bottles along with insurance card and picture ID to all appointments.     EDUCATION:  Things You are responsible For To Manage Your Care At Home:  1.    Getting your prescriptions filled   2.    Taking your medications as directed, DO NOT MISS ANY DOSES!  3.    Going to your follow-up doctor appointment. This is important because it  allow the doctor to monitor your progress and determine if  any changes need to made to your treatment plan.   Patient agreed to all above    NurseDedra notified that all CM needs are met

## 2018-11-26 NOTE — H&P
"[facility] Medicine  History & Physical    Patient Name: Duran Mccauley  MRN: [id number]  Admission Date: 11/25/2018  Attending Physician: Lennie Anderson MD   Primary Care Provider: Patrick Cook MD         Patient information was obtained from patient, spouse/SO, past medical records and ER records.     Subjective:     Principal Problem:Dyspnea on exertion    Chief Complaint:   Chief Complaint   Patient presents with    Shortness of Breath     " feeling weak and i had breathing problems at home, can't walk to far, i have to stop, haven't eaten in two days" c/o weakness x 2 days, SOBx 3 days ago, reports SOB was improved with dialysis 2 days ago, SOB returned today         HPI: 58 y.o. male with ESRD on HD (mwf), HTN, and HLD presents with a complaint of SOB for the past 3 days, exacerbated by exertion and laying flat, initially improve with HD on Friday but has since worsened, associated with nausea, and cough.  Denies fever, chills, chest pain, palpitations, vomiting, hematemesis, coffee ground emesis, diarrhea, abdominal pain, bloody or black stools, or dysuria.  EKG without evidence of acute ischemia, initial toponin 0.097, , WBC 18,000, chest xray with small bilat pleural effusions but no sign of significant pulmonary edema or focal consolidation, lactic acid normal at 1.8.  Other labs consistent with his chronic baseline.  V/Q scan is pending, Nephrology has been consulted.  Placed in observation for further evaluation and treatment.    Past Medical History:   Diagnosis Date    Dialysis patient     MWF    Eczema     Hepatitis B     treated 2010s and negative RNA load afterward    Hyperlipidemia     Hypertension        Past Surgical History:   Procedure Laterality Date    COLONOSCOPY N/A 9/14/2018    Procedure: COLONOSCOPY;  Surgeon: Viri Blair MD;  Location: [facility];  Service: Endoscopy;  Laterality: N/A;    COLONOSCOPY N/A 9/14/2018    Performed by " [facility]i Blair MD at [facility]    TUNNELED VENOUS CATHETER PLACEMENT      HD cath       Review of patient's allergies indicates:  No Known Allergies    No current facility-administered medications on file prior to encounter.      Current Outpatient Medications on File Prior to Encounter   Medication Sig    amLODIPine (NORVASC) 10 MG tablet Take 1 tablet (10 mg total) by mouth once daily.    atorvastatin (LIPITOR) 20 MG tablet Take 1 tablet (20 mg total) by mouth once daily.    omeprazole (PRILOSEC) 40 MG capsule TK 1 C PO QAM    predniSONE (DELTASONE) 20 MG tablet 60 mg x 3 days then 4 0mg x 3 days the 20 mg until complete    colchicine (COLCRYS) 0.6 mg tablet 1 tab PO every 2 weeks PRN gout flare    oxyCODONE-acetaminophen (PERCOCET) 5-325 mg per tablet Take 1 tablet by mouth every 8 (eight) hours as needed for Pain.     Family History     Problem Relation (Age of Onset)    Diabetes Brother    Heart disease Father        Tobacco Use    Smoking status: Never Smoker    Smokeless tobacco: Never Used   Substance and Sexual Activity    Alcohol use: Yes     Alcohol/week: 0.6 oz     Types: 1 Cans of beer per week    Drug use: No    Sexual activity: Yes     Partners: Female     Review of Systems   Constitutional: Positive for fatigue. Negative for chills and fever.   Eyes: Negative for photophobia and visual disturbance.   Respiratory: Positive for cough and shortness of breath.    Cardiovascular: Negative for chest pain, palpitations and leg swelling.   Gastrointestinal: Negative for abdominal pain, diarrhea, nausea and vomiting.   Genitourinary: Negative for frequency, hematuria and urgency.   Skin: Negative for pallor, rash and wound.   Neurological: Negative for light-headedness and headaches.   Psychiatric/Behavioral: Negative for confusion and decreased concentration.     Objective:     Vital Signs (Most Recent):  Temp: 98.2 °F (36.8 °C) (11/25/18 1509)  Pulse: (!) 117 (11/25/18 1600)  Resp: (!) 24  (11/25/18 1509)  BP: (!) 186/108 (11/25/18 1600)  SpO2: 100 % (11/25/18 1600) Vital Signs (24h Range):  Temp:  [98.2 °F (36.8 °C)] 98.2 °F (36.8 °C)  Pulse:  [117-121] 117  Resp:  [24] 24  SpO2:  [100 %] 100 %  BP: (174-186)/(105-108) 186/108     Weight: 56.2 kg (124 lb)  Body mass index is 20.01 kg/m².    Physical Exam   Constitutional: He is oriented to person, place, and time. He appears well-developed and well-nourished. No distress.   HENT:   Head: Normocephalic and atraumatic.   Right Ear: External ear normal.   Left Ear: External ear normal.   Nose: Nose normal.   Mouth/Throat: Oropharynx is clear and moist.   Eyes: Conjunctivae and EOM are normal. Pupils are equal, round, and reactive to light.   Neck: Normal range of motion. Neck supple.   Cardiovascular: Normal rate, regular rhythm and intact distal pulses.   Pulmonary/Chest: Effort normal. No respiratory distress. He has decreased breath sounds in the right lower field and the left lower field. He has no wheezes. He has no rales.   Abdominal: Soft. Bowel sounds are normal. He exhibits no distension. There is no tenderness.   No palpable hepatomegaly or splenomegaly   Musculoskeletal: Normal range of motion. He exhibits no edema or tenderness.   Neurological: He is alert and oriented to person, place, and time.   Skin: Skin is warm and dry.   Psychiatric: He has a normal mood and affect. Thought content normal.   Nursing note and vitals reviewed.        CRANIAL NERVES     CN III, IV, VI   Pupils are equal, round, and reactive to light.  Extraocular motions are normal.        Significant Labs: All pertinent labs within the past 24 hours have been reviewed.    Significant Imaging: I have reviewed all pertinent imaging results/findings within the past 24 hours.    Assessment/Plan:     * Dyspnea on exertion    Etiology unclear but PE, fluid overload, new onset heart failure, or ACS are possible.  V/Q scan pending, will also obtain echo and trend troponin,  monitor on tele.  Nephrology has been consulted for HD.  He has no history of COPD or chronic que disease, pneumonia less likely given chest xray without focal consolidation or opacity.     Elevated troponin level    Mild elevation in setting of ESRD without chest pain, EKG without evidence of acute ischemia, monitor on tele and trend.     Leukocytosis    Received corticosteroids recently though infection cannot be definitively excluded. He is afebrile, chest xray clear, lactic normal.  If fever develops, obtain blood cultures.  CBC in am.     ESRD on hemodialysis    Adherent to outpatient regimen, Nephrology consulted     Pure hypercholesterolemia    Continue statin     Essential (primary) hypertension    Poorly controlled, continue home medications and monitor blood pressure, adjust as needed.  PRN clonidine available.        VTE Risk Mitigation (From admission, onward)        Ordered     heparin (porcine) injection 5,000 Units  Every 8 hours      11/25/18 2030     Place IVANA hose  Until discontinued      11/25/18 1947        Suleiman Fernandez Jr., APRN, AGACN-BC  Hospitalist - Department of Hospital Medicine  [facility] [address] [facility] [facility] [place] [address]  Office #: [phone]; Pager #: [phone]

## 2018-11-26 NOTE — ASSESSMENT & PLAN NOTE
Mild elevation in setting of ESRD without chest pain, EKG without evidence of acute ischemia, monitor on tele and trend.

## 2018-11-26 NOTE — SUBJECTIVE & OBJECTIVE
Past Medical History:   Diagnosis Date    Dialysis patient     MWF    Eczema     Hepatitis B     treated 2010s and negative RNA load afterward    Hyperlipidemia     Hypertension        Past Surgical History:   Procedure Laterality Date    COLONOSCOPY N/A 9/14/2018    Procedure: COLONOSCOPY;  Surgeon: Viri Blair MD;  Location: [facility] ENDO;  Service: Endoscopy;  Laterality: N/A;    COLONOSCOPY N/A 9/14/2018    Performed by Viri Blair MD at [facility] ENDO    TUNNELED VENOUS CATHETER PLACEMENT      HD cath       Review of patient's allergies indicates:  No Known Allergies    No current facility-administered medications on file prior to encounter.      Current Outpatient Medications on File Prior to Encounter   Medication Sig    amLODIPine (NORVASC) 10 MG tablet Take 1 tablet (10 mg total) by mouth once daily.    atorvastatin (LIPITOR) 20 MG tablet Take 1 tablet (20 mg total) by mouth once daily.    omeprazole (PRILOSEC) 40 MG capsule TK 1 C PO QAM    predniSONE (DELTASONE) 20 MG tablet 60 mg x 3 days then 4 0mg x 3 days the 20 mg until complete    colchicine (COLCRYS) 0.6 mg tablet 1 tab PO every 2 weeks PRN gout flare    oxyCODONE-acetaminophen (PERCOCET) 5-325 mg per tablet Take 1 tablet by mouth every 8 (eight) hours as needed for Pain.     Family History     Problem Relation (Age of Onset)    Diabetes Brother    Heart disease Father        Tobacco Use    Smoking status: Never Smoker    Smokeless tobacco: Never Used   Substance and Sexual Activity    Alcohol use: Yes     Alcohol/week: 0.6 oz     Types: 1 Cans of beer per week    Drug use: No    Sexual activity: Yes     Partners: Female     Review of Systems   Constitutional: Positive for fatigue. Negative for chills and fever.   Eyes: Negative for photophobia and visual disturbance.   Respiratory: Positive for cough and shortness of breath.    Cardiovascular: Negative for chest pain, palpitations and leg swelling.   Gastrointestinal: Negative for  abdominal pain, diarrhea, nausea and vomiting.   Genitourinary: Negative for frequency, hematuria and urgency.   Skin: Negative for pallor, rash and wound.   Neurological: Negative for light-headedness and headaches.   Psychiatric/Behavioral: Negative for confusion and decreased concentration.     Objective:     Vital Signs (Most Recent):  Temp: 98.2 °F (36.8 °C) (11/25/18 1509)  Pulse: (!) 117 (11/25/18 1600)  Resp: (!) 24 (11/25/18 1509)  BP: (!) 186/108 (11/25/18 1600)  SpO2: 100 % (11/25/18 1600) Vital Signs (24h Range):  Temp:  [98.2 °F (36.8 °C)] 98.2 °F (36.8 °C)  Pulse:  [117-121] 117  Resp:  [24] 24  SpO2:  [100 %] 100 %  BP: (174-186)/(105-108) 186/108     Weight: 56.2 kg (124 lb)  Body mass index is 20.01 kg/m².    Physical Exam   Constitutional: He is oriented to person, place, and time. He appears well-developed and well-nourished. No distress.   HENT:   Head: Normocephalic and atraumatic.   Right Ear: External ear normal.   Left Ear: External ear normal.   Nose: Nose normal.   Mouth/Throat: Oropharynx is clear and moist.   Eyes: Conjunctivae and EOM are normal. Pupils are equal, round, and reactive to light.   Neck: Normal range of motion. Neck supple.   Cardiovascular: Normal rate, regular rhythm and intact distal pulses.   Pulmonary/Chest: Effort normal. No respiratory distress. He has decreased breath sounds in the right lower field and the left lower field. He has no wheezes. He has no rales.   Abdominal: Soft. Bowel sounds are normal. He exhibits no distension. There is no tenderness.   No palpable hepatomegaly or splenomegaly   Musculoskeletal: Normal range of motion. He exhibits no edema or tenderness.   Neurological: He is alert and oriented to person, place, and time.   Skin: Skin is warm and dry.   Psychiatric: He has a normal mood and affect. Thought content normal.   Nursing note and vitals reviewed.        CRANIAL NERVES     CN III, IV, VI   Pupils are equal, round, and reactive to  light.  Extraocular motions are normal.        Significant Labs: All pertinent labs within the past 24 hours have been reviewed.    Significant Imaging: I have reviewed all pertinent imaging results/findings within the past 24 hours.

## 2018-11-26 NOTE — CONSULTS
"                                    Renal Consult    Date of Admission:  11/25/2018  3:11 PM        Chief Complaint:   Chief Complaint   Patient presents with    Shortness of Breath     " feeling weak and i had breathing problems at home, can't walk to far, i have to stop, haven't eaten in two days" c/o weakness x 2 days, SOBx 3 days ago, reports SOB was improved with dialysis 2 days ago, SOB returned today        HPI: 58 y.o. male with ESRD on HD (mwf), HTN, and HLD presents to [facility] ED with complaints of SOB for the past 3 days, exacerbated by exertion and laying flat, initially improved with HD on Friday but since worsened, associated with high BP, nausea, and cough.  Denies fever, chills, chest pain, palpitations.      PMH:  Past Medical History:   Diagnosis Date    Dialysis patient     MWF    Eczema     Hepatitis B     treated 2010s and negative RNA load afterward    Hyperlipidemia     Hypertension        PSH:  Past Surgical History:   Procedure Laterality Date    COLONOSCOPY N/A 9/14/2018    Procedure: COLONOSCOPY;  Surgeon: Viri Blair MD;  Location: [facility] ENDO;  Service: Endoscopy;  Laterality: N/A;    COLONOSCOPY N/A 9/14/2018    Performed by Viri Blair MD at [facility] ENDO    TUNNELED VENOUS CATHETER PLACEMENT      HD cath       Allergies:  Review of patient's allergies indicates:  No Known Allergies    No current facility-administered medications on file prior to encounter.      Current Outpatient Medications on File Prior to Encounter   Medication Sig Dispense Refill    amLODIPine (NORVASC) 10 MG tablet Take 1 tablet (10 mg total) by mouth once daily. 30 tablet 11    atorvastatin (LIPITOR) 20 MG tablet Take 1 tablet (20 mg total) by mouth once daily. 90 tablet 3    omeprazole (PRILOSEC) 40 MG capsule TK 1 C PO QAM  1    predniSONE (DELTASONE) 20 MG tablet 60 mg x 3 days then 4 0mg x 3 days the 20 mg until complete 18 tablet 0    colchicine (COLCRYS) 0.6 mg tablet 1 tab PO every 2 weeks PRN " gout flare 30 tablet 0    oxyCODONE-acetaminophen (PERCOCET) 5-325 mg per tablet Take 1 tablet by mouth every 8 (eight) hours as needed for Pain. 10 tablet 0       Medications:  Current Facility-Administered Medications   Medication Dose Route Frequency Provider Last Rate Last Dose    acetaminophen tablet 650 mg  650 mg Oral Q6H PRN Antoine Posada Jr., MD        amLODIPine tablet 10 mg  10 mg Oral Daily Antoine Posada Jr., MD        atorvastatin tablet 20 mg  20 mg Oral Daily Antoine Posada Jr., MD   20 mg at 11/26/18 0828    epoetin faviola injection 10,000 Units  10,000 Units Intravenous Once Hever Robles MD        heparin (porcine) injection 5,000 Units  5,000 Units Subcutaneous Q8H Suleiman Fernandez Jr., NP   Stopped at 11/26/18 1400    heparin (porcine) injection 5,000 Units  5,000 Units Intra-Catheter PRN Hever Robles MD        hydrALAZINE injection 10 mg  10 mg Intravenous Q6H PRN Kehinde Regan MD   10 mg at 11/26/18 0401    metoprolol injection 5 mg  5 mg Intravenous Q5 Min PRN Kehinde Regan MD   5 mg at 11/26/18 1735    metoprolol injection 5 mg  5 mg Intravenous Once Hever Robles MD        metoprolol succinate (TOPROL-XL) 24 hr tablet 50 mg  50 mg Oral BID Hever Robles MD        olmesartan tablet 20 mg  20 mg Oral Daily Hever Robles MD        pantoprazole EC tablet 40 mg  40 mg Oral Daily Antoine Posada Jr., MD   40 mg at 11/26/18 0828    ramelteon tablet 8 mg  8 mg Oral Nightly PRN Suleiman Fernandez Jr., NP        senna-docusate 8.6-50 mg per tablet 1 tablet  1 tablet Oral BID Antoine Posada Jr., MD   1 tablet at 11/26/18 0830       FamHx:  Family History   Problem Relation Age of Onset    Heart disease Father     Diabetes Brother        SocHx:  Social History     Socioeconomic History    Marital status:      Spouse name: Not on file    Number of children: Not on file    Years of education: Not on  file    Highest education level: Not on file   Social Needs    Financial resource strain: Not on file    Food insecurity - worry: Not on file    Food insecurity - inability: Not on file    Transportation needs - medical: Not on file    Transportation needs - non-medical: Not on file   Occupational History    Not on file   Tobacco Use    Smoking status: Never Smoker    Smokeless tobacco: Never Used   Substance and Sexual Activity    Alcohol use: Yes     Alcohol/week: 0.6 oz     Types: 1 Cans of beer per week    Drug use: No    Sexual activity: Yes     Partners: Female   Other Topics Concern    Not on file   Social History Narrative    Not on file           Review of Systems:  General: No fever, chills  HEENT: neg  Respiratory: SOB better  Heart: no CP or palpitations  Abdomen: No abdominal pain, nausea, vomiting, diarrhea, hematemesis or blood in  Stool.  : No flank pain, dysuria, urgency, frequency or hematuria  Musculoskeletal: No edema  Skin: No rashes:  Neurologic: Neg.    All other review of systems negative.      Physical Exam:  Vitals:   Vitals:    11/26/18 1122   BP: (!) 176/96   Pulse: 110   Resp: 18   Temp: 98.2 °F (36.8 °C)       No intake/output data recorded.  No intake/output data recorded.    General: No apparent distress.   Neck: supple no JVD  Lungs: CTA bilateral  Heart: RRR, S1-S2  Abdomen: Soft. NT.  : n/a  Ext: No edema  Skin: The skin is warm   Neurologic: Awake and alert, oriented x 3      Laboratories: Nov 25th. 2018    WBC              18.00   WBC      RBC              3.10   RBC     Hemoglobin              9.1   Hemoglobin     Hematocrit              26.6   Hematocrit      Sodium              138   Sodium      Potassium              4.9   Potassium     Chloride              103   Chloride     CO2              21   CO2     Anion Gap              14   Anion Gap     BUN, Bld              69   BUN, Bld     Creatinine              11.1   Creatinine      Troponin I               0.099 0.105  Troponin I     BNP              823   BNP       No results for input(s): WBC, RBC, HGB, HCT, PLT, MCV, MCH, MCHC in the last 24 hours.    No results for input(s): GLUCOSE, CALCIUM, PROT, NA, K, CO2, CL, BUN, CREATININE, ALKPHOS, ALT, AST, BILITOT in the last 24 hours.    Invalid input(s):  ALBUMIN    No results for input(s): COLORU, CLARITYU, SPECGRAV, PHUR, PROTEINUA, GLUCOSEU, BLOODU, WBCU, RBCU, BACTERIA, MUCUS in the last 24 hours.    Invalid input(s):  BILIRUBINCON    Microbiology Results (last 7 days)     ** No results found for the last 168 hours. **            Diagnostic Tests:  NM Lung Ventilation Perfusion Imaging [[id number]] Resulted: 11/25/18 1938   Order Status: Completed      FINDINGS:  Normal perfusion seen with no mismatched perfusion defects.  Normal washout is seen at the end of ventilation without evidence for significant air trapping.   Impression:       Low probability V/Q scan for potential pulmonary embolism.      Electronically signed by: Joseph Kan MD  Date: 11/25/2018  Time: 19:38     X-Ray Chest PA And Lateral [[id number]] Resulted: 11/25/18 1554   Order Status: Completed      FINDINGS:  Right-sided dialysis catheter present.  Cardiac silhouette and mediastinal contours within normal limits.  Small bilateral pleural effusions present with adjacent atelectasis.  Upper lungs clear.   Impression:       Small bilateral pleural effusions with adjacent atelectasis.      Electronically signed by: Roberto Bruno MD  Date: 11/25/2018  Time: 15:54       Assessment:    59 y/o AAM with ESRD on chronic Dialysis admitted with:    - Uncontrolled HTN  - Dyspnea et? No significant fluid overload or P.E.  - Elevated troponin likely due to ESRD  - Anemia of CKD        Plan:    - Dialysis in progress, UF as tolerated  - Add Toprol XL 50mg BID and Olmesartan 40mg for BP control  - Renal diet  - Hold Epogen until BP controlled

## 2018-11-26 NOTE — ASSESSMENT & PLAN NOTE
Poorly controlled, continue home medications and monitor blood pressure, adjust as needed.  PRN clonidine available.

## 2018-11-26 NOTE — ASSESSMENT & PLAN NOTE
Received corticosteroids recently though infection cannot be definitively excluded. He is afebrile, chest xray clear, lactic normal.  If fever develops, obtain blood cultures.  CBC in am.

## 2018-11-26 NOTE — PLAN OF CARE
To patient's room to discuss patient managing his care at home.      TN Role Explained.  Patient identified by using 2 identifiers:  Name and date of birth    Patient stated that his WILL HELP AT HOME WITH his RECOVERY.      TN name and contact info placed on the communication board    Preferred Pharmacy:  [id number]  [place] - [address] AT [facility] [address]  [address]  [place] [address]  Phone: [phone] Fax: [phone]       11/26/18 1134   Discharge Assessment   Assessment Type Discharge Planning Assessment   Confirmed/corrected address and phone number on facesheet? Yes   Assessment information obtained from? Patient   Expected Length of Stay (days) 2   Communicated expected length of stay with patient/caregiver yes   Prior to hospitilization cognitive status: Alert/Oriented   Prior to hospitalization functional status: Independent   Current cognitive status: Alert/Oriented   Current Functional Status: Independent   Lives With spouse;child(hardy), adult   Able to Return to Prior Arrangements yes   Is patient able to care for self after discharge? Yes   Patient's perception of discharge disposition home or selfcare   Readmission Within The Last 30 Days no previous admission in last 30 days   Patient currently being followed by outpatient case management? No   Patient currently receives any other outside agency services? No   Equipment Currently Used at Home none   Do you have any problems affording any of your prescribed medications? No   Is the patient taking medications as prescribed? yes   Does the patient have transportation home? Yes   Transportation Available family or friend will provide   Dialysis Name and Scheduled days [facility] MWF   Does the patient receive services at the Coumadin Clinic? No   Discharge Plan A Home with family   Patient/Family In Agreement With Plan yes

## 2018-11-26 NOTE — DISCHARGE SUMMARY
[facility] Hospital Medicine  Discharge Summary      Patient Name: Duran Mccauley  MRN: [id number]  Admission Date: 11/25/2018  Hospital Length of Stay: 0 days  Discharge Date and Time:  11/26/2018 11:39 AM  Attending Physician: Lennie Anderson MD   Discharging Provider: EVELIA Santamaria  Primary Care Provider: Patrick Cook MD      HPI:   58 y.o. male with ESRD on HD (mwf), HTN, and HLD presents with a complaint of SOB for the past 3 days, exacerbated by exertion and laying flat, initially improve with HD on Friday but has since worsened, associated with nausea, and cough.  Denies fever, chills, chest pain, palpitations, vomiting, hematemesis, coffee ground emesis, diarrhea, abdominal pain, bloody or black stools, or dysuria.  EKG without evidence of acute ischemia, initial toponin 0.097, , WBC 18,000, chest xray with small bilat pleural effusions but no sign of significant pulmonary edema or focal consolidation, lactic acid normal at 1.8.  Other labs consistent with his chronic baseline.  V/Q scan is pending, Nephrology has been consulted.  Placed in observation for further evaluation and treatment.    * No surgery found *      Hospital Course:   Placed in observation for evaluation of SOB that appeared to be largely related to FVO - bumped trop that trended flat in the presence of significant renal failure - denies chest pain - EKG without acute ischemia and no ectopy on tele overnight.VQ scan with low prob of PE. Nephrology was consulted and patient scheduled for dialysis - He is a MWF - will discharge afterwards.     Consults:   Consults (From admission, onward)        Status Ordering Provider     Inpatient consult to Nephrology  Once     Provider:  Hever Robles MD    Acknowledged ADRIANNA MONACO JR          No new Assessment & Plan notes have been filed under this hospital service since the last note was generated.  Service: Hospital Medicine    Final  Active Diagnoses:    Diagnosis Date Noted POA    PRINCIPAL PROBLEM:  Dyspnea on exertion [R06.09] 11/25/2018 Yes    Leukocytosis [D72.829] 11/25/2018 Yes    Elevated troponin level [R74.8] 11/25/2018 Yes    ESRD on hemodialysis [N18.6, Z99.2] 09/12/2018 Not Applicable     Chronic    Pure hypercholesterolemia [E78.00] 12/08/2015 Yes     Chronic    Essential (primary) hypertension [I10] 12/04/2015 Yes     Chronic      Problems Resolved During this Admission:       Discharged Condition: stable    Disposition: Home or Self Care    Follow Up:  Follow-up Information     Patrick Cook MD.    Specialty:  Internal Medicine  Contact information:  [facility] [facility]  [place] [address] [phone]                 Patient Instructions:      Diet renal     Activity as tolerated       Significant Diagnostic Studies: Labs:   CMP   Recent Labs   Lab 11/25/18  1550      K 4.9      CO2 21*   *   BUN 69*   CREATININE 11.1*   CALCIUM 9.1   PROT 6.4   ALBUMIN 3.6   BILITOT 1.5*   ALKPHOS 80   AST 16   ALT 21   ANIONGAP 14   ESTGFRAFRICA 5*   EGFRNONAA 4*   , CBC   Recent Labs   Lab 11/25/18  1550   WBC 18.00*   HGB 9.1*   HCT 26.6*      , INR   Lab Results   Component Value Date    INR 1.0 09/12/2018   , Lipid Panel   Lab Results   Component Value Date    CHOL 158 11/01/2018    HDL 44 11/01/2018    LDLCALC 82.0 11/01/2018    TRIG 160 (H) 11/01/2018    CHOLHDL 27.8 11/01/2018   , Troponin   Recent Labs   Lab 11/26/18  0437   TROPONINI 0.105*    and A1C:   Recent Labs   Lab 09/13/18  1151   HGBA1C 4.5       Pending Diagnostic Studies:     None         Medications:  Reconciled Home Medications:      Medication List      CONTINUE taking these medications    amLODIPine 10 MG tablet  Commonly known as:  NORVASC  Take 1 tablet (10 mg total) by mouth once daily.     atorvastatin 20 MG tablet  Commonly known as:  LIPITOR  Take 1 tablet (20 mg total) by mouth once daily.     colchicine 0.6 mg tablet  Commonly  known as:  COLCRYS  1 tab PO every 2 weeks PRN gout flare     omeprazole 40 MG capsule  Commonly known as:  PRILOSEC  TK 1 C PO QAM     oxyCODONE-acetaminophen 5-325 mg per tablet  Commonly known as:  PERCOCET  Take 1 tablet by mouth every 8 (eight) hours as needed for Pain.     predniSONE 20 MG tablet  Commonly known as:  DELTASONE  60 mg x 3 days then 4 0mg x 3 days the 20 mg until complete            Indwelling Lines/Drains at time of discharge:   Lines/Drains/Airways     Central Venous Catheter Line                 Tunneled Central Line Insertion/Assessment - Double Lumen  09/19/18 1500 right internal jugular 67 days          Airway                 Airway - Non-Surgical 09/14/18 0928 Nasal Cannula 73 days                Time spent on the discharge of patient: 45 minutes  Patient was seen and examined on the date of discharge and determined to be suitable for discharge.         TRAV Justice, FNP-C  Hospitalist - Department of Hospital Medicine  [address] [place] [address]  Office [phone]; Pager [phone]

## 2018-11-26 NOTE — HOSPITAL COURSE
Placed in observation for evaluation of SOB that appeared to be largely related to FVO - bumped trop that trended flat in the presence of significant renal failure - denies chest pain - EKG without acute ischemia and no ectopy on tele overnight.VQ scan with low prob of PE. Nephrology was consulted and patient scheduled for dialysis - He is a MWF - will discharge afterwards.

## 2018-11-26 NOTE — HPI
58 y.o. male with ESRD on HD (mwf), HTN, and HLD presents with a complaint of SOB for the past 3 days, exacerbated by exertion and laying flat, initially improve with HD on Friday but has since worsened, associated with nausea, and cough.  Denies fever, chills, chest pain, palpitations, vomiting, hematemesis, coffee ground emesis, diarrhea, abdominal pain, bloody or black stools, or dysuria.  EKG without evidence of acute ischemia, initial toponin 0.097, , WBC 18,000, chest xray with small bilat pleural effusions but no sign of significant pulmonary edema or focal consolidation, lactic acid normal at 1.8.  Other labs consistent with his chronic baseline.  V/Q scan is pending, Nephrology has been consulted.  Placed in observation for further evaluation and treatment.

## 2018-11-26 NOTE — ASSESSMENT & PLAN NOTE
Etiology unclear but PE, fluid overload, new onset heart failure, or ACS are possible.  V/Q scan pending, will also obtain echo and trend troponin, monitor on tele.  Nephrology has been consulted for HD.  He has no history of COPD or chronic que disease, pneumonia less likely given chest xray without focal consolidation or opacity.

## 2018-11-27 LAB
HAV IGM SERPL QL IA: NEGATIVE
HBV CORE IGM SERPL QL IA: NEGATIVE
HBV SURFACE AG SERPL QL IA: NEGATIVE
HCV AB SERPL QL IA: NEGATIVE

## 2018-11-27 NOTE — NURSING
Pt brought to front of hospital via wheelchair per transport with family at side in stable condition.

## 2018-11-27 NOTE — NURSING
Pt okay for discharge. Discharge instructions given to family and patient. Verbalized understanding. Pt instructed to  prescribed medications from [facility] which is his verified pharmacy. IV and telemetry discontinued. IV with jelco intact and pressure dressing applied.

## 2018-11-29 LAB
HBV SURFACE AB SER QL IA: NEGATIVE
HBV SURFACE AB SERPL IA-ACNC: 8 MIU/ML

## 2018-12-14 ENCOUNTER — OFFICE VISIT (OUTPATIENT)
Dept: FAMILY MEDICINE | Facility: CLINIC | Age: 58
End: 2018-12-14
Attending: EMERGENCY MEDICINE
Payer: COMMERCIAL

## 2018-12-14 VITALS
TEMPERATURE: 98 F | SYSTOLIC BLOOD PRESSURE: 110 MMHG | WEIGHT: 118.25 LBS | DIASTOLIC BLOOD PRESSURE: 80 MMHG | HEART RATE: 96 BPM | HEIGHT: 66 IN | BODY MASS INDEX: 19.01 KG/M2 | OXYGEN SATURATION: 98 %

## 2018-12-14 DIAGNOSIS — I10 ESSENTIAL (PRIMARY) HYPERTENSION: Chronic | ICD-10-CM

## 2018-12-14 DIAGNOSIS — N18.6 ESRD ON HEMODIALYSIS: Chronic | ICD-10-CM

## 2018-12-14 DIAGNOSIS — Z09 HOSPITAL DISCHARGE FOLLOW-UP: Primary | ICD-10-CM

## 2018-12-14 DIAGNOSIS — R63.4 WEIGHT LOSS, UNINTENTIONAL: ICD-10-CM

## 2018-12-14 DIAGNOSIS — Z99.2 ESRD ON HEMODIALYSIS: Chronic | ICD-10-CM

## 2018-12-14 DIAGNOSIS — E44.0 MALNUTRITION OF MODERATE DEGREE: ICD-10-CM

## 2018-12-14 PROBLEM — R06.09 DYSPNEA ON EXERTION: Status: RESOLVED | Noted: 2018-11-25 | Resolved: 2018-12-14

## 2018-12-14 PROBLEM — R79.89 ELEVATED TROPONIN LEVEL: Status: RESOLVED | Noted: 2018-11-25 | Resolved: 2018-12-14

## 2018-12-14 PROCEDURE — 3008F BODY MASS INDEX DOCD: CPT | Mod: CPTII,S$GLB,, | Performed by: INTERNAL MEDICINE

## 2018-12-14 PROCEDURE — 99999 PR PBB SHADOW E&M-EST. PATIENT-LVL III: CPT | Mod: PBBFAC,,, | Performed by: INTERNAL MEDICINE

## 2018-12-14 PROCEDURE — 99214 OFFICE O/P EST MOD 30 MIN: CPT | Mod: S$GLB,,, | Performed by: INTERNAL MEDICINE

## 2018-12-14 NOTE — PROGRESS NOTES
Assessment & Plan  Problem List Items Addressed This Visit        Cardiac/Vascular    Essential (primary) hypertension (Chronic)    Current Assessment & Plan     The current medical regimen is effective;  continue present plan and medications.             Renal/    ESRD on hemodialysis (Chronic)    Overview     MWF         Current Assessment & Plan     Monitor             Endocrine    Weight loss, unintentional    Current Assessment & Plan     Weight at HD 3x a week.  Down on our records.  Recommended Nepro or similar supplement.          Malnutrition of moderate degree    Current Assessment & Plan     Recommended Nepro or similar supplement.              Other Visit Diagnoses     Hospital discharge follow-up    -  Primary            Health Maintenance reviewed, up to date.    Follow-up: Follow-up for Routine Physical, Nov 2019.    ______________________________________________________________________    Chief Complaint  Chief Complaint   Patient presents with    Hospital Follow Up       HPI  Duran Mccauley is a 58 y.o. male with multiple medical diagnoses as listed in the medical history and problem list that presents for hospital follow up.  Pt is known to me with his last appointment 11/20/2018.      Hospital Course:   Placed in observation for evaluation of SOB that appeared to be largely related to FVO - bumped trop that trended flat in the presence of significant renal failure - denies chest pain - EKG without acute ischemia and no ectopy on tele overnight.VQ scan with low prob of PE. Nephrology was consulted and patient scheduled for dialysis - He is a MWF - will discharge afterwards.    Currently:    He is feeling back to his baseline.  No CP, SOB, palpitations.  Energy level is good.       PAST MEDICAL HISTORY:  Past Medical History:   Diagnosis Date    Dialysis patient     MWF    Eczema     Hepatitis B     treated 2010s and negative RNA load afterward    Hyperlipidemia     Hypertension         PAST SURGICAL HISTORY:  Past Surgical History:   Procedure Laterality Date    COLONOSCOPY N/A 9/14/2018    Procedure: COLONOSCOPY;  Surgeon: Viri Blair MD;  Location: [facility] ENDO;  Service: Endoscopy;  Laterality: N/A;    COLONOSCOPY N/A 9/14/2018    Performed by Viri Blair MD at [facility] ENDO    TUNNELED VENOUS CATHETER PLACEMENT      HD cath       SOCIAL HISTORY:  Social History     Socioeconomic History    Marital status:      Spouse name: Not on file    Number of children: Not on file    Years of education: Not on file    Highest education level: Not on file   Social Needs    Financial resource strain: Not on file    Food insecurity - worry: Not on file    Food insecurity - inability: Not on file    Transportation needs - medical: Not on file    Transportation needs - non-medical: Not on file   Occupational History    Not on file   Tobacco Use    Smoking status: Never Smoker    Smokeless tobacco: Never Used   Substance and Sexual Activity    Alcohol use: Yes     Alcohol/week: 0.6 oz     Types: 1 Cans of beer per week    Drug use: No    Sexual activity: Yes     Partners: Female   Other Topics Concern    Not on file   Social History Narrative    Not on file       FAMILY HISTORY:  Family History   Problem Relation Age of Onset    Heart disease Father     Diabetes Brother        ALLERGIES AND MEDICATIONS: updated and reviewed.  Review of patient's allergies indicates:  No Known Allergies  Current Outpatient Medications   Medication Sig Dispense Refill    amLODIPine (NORVASC) 10 MG tablet Take 1 tablet (10 mg total) by mouth once daily. 30 tablet 11    atorvastatin (LIPITOR) 20 MG tablet Take 1 tablet (20 mg total) by mouth once daily. 90 tablet 3    metoprolol succinate (TOPROL-XL) 50 MG 24 hr tablet Take 1 tablet (50 mg total) by mouth 2 (two) times daily. 60 tablet 0    olmesartan (BENICAR) 20 MG tablet Take 1 tablet (20 mg total) by mouth once daily. 90 tablet 0     "omeprazole (PRILOSEC) 40 MG capsule TK 1 C PO QAM  1     No current facility-administered medications for this visit.          ROS  Review of Systems   Constitutional: Negative for chills, fever and unexpected weight change.   HENT: Negative for congestion, dental problem, ear pain, hearing loss, rhinorrhea, sore throat and trouble swallowing.    Eyes: Negative for discharge, redness and visual disturbance.   Respiratory: Negative for cough, chest tightness, shortness of breath and wheezing.    Cardiovascular: Negative for chest pain, palpitations and leg swelling.   Gastrointestinal: Negative for abdominal pain, constipation, diarrhea, nausea and vomiting.   Endocrine: Negative for polydipsia, polyphagia and polyuria.   Genitourinary: Negative for decreased urine volume, dysuria and hematuria.   Musculoskeletal: Negative for arthralgias and myalgias.   Skin: Negative for color change and rash.   Neurological: Negative for dizziness, weakness, light-headedness and headaches.   Psychiatric/Behavioral: Negative for decreased concentration, dysphoric mood, sleep disturbance and suicidal ideas.           Physical Exam  Vitals:    12/14/18 1130   BP: 110/80   Pulse: 96   Temp: 98.3 °F (36.8 °C)   SpO2: 98%   Weight: 53.7 kg (118 lb 4.4 oz)   Height: 5' 6" (1.676 m)    Body mass index is 19.09 kg/m².  Weight: 53.7 kg (118 lb 4.4 oz)   Height: 5' 6" (167.6 cm)   Physical Exam   Constitutional: He is oriented to person, place, and time. He appears well-developed and well-nourished. No distress.   HENT:   Head: Normocephalic and atraumatic.   Eyes: Conjunctivae, EOM and lids are normal. Pupils are equal, round, and reactive to light. No scleral icterus.   Neck: Full passive range of motion without pain. Neck supple. No JVD present. Carotid bruit is not present. No thyromegaly present.   Cardiovascular: Normal rate, regular rhythm, normal heart sounds and intact distal pulses. Exam reveals no S3, no S4 and no friction rub. "   No murmur heard.  Pulmonary/Chest: Effort normal and breath sounds normal. He has no wheezes. He has no rhonchi. He has no rales.   Port-a-cath noted   Abdominal: Soft. Bowel sounds are normal. There is no tenderness.   Musculoskeletal: He exhibits no edema or tenderness.   Lymphadenopathy:        Head (right side): No submental and no submandibular adenopathy present.        Head (left side): No submental and no submandibular adenopathy present.     He has no cervical adenopathy.        Right: No supraclavicular adenopathy present.        Left: No supraclavicular adenopathy present.   Neurological: He is alert and oriented to person, place, and time.   Motor grossly intact.  Sensory grossly intact.  Symmetric facial movements palate elevated symmetrically tongue midline   Skin: Skin is warm and dry. No rash noted.   Psychiatric: He has a normal mood and affect. His speech is normal and behavior is normal. Thought content normal.         Health Maintenance       Date Due Completion Date    Lipid Panel 11/01/2023 11/1/2018    TETANUS VACCINE 06/08/2026 6/8/2016    Colonoscopy 09/14/2028 9/14/2018    Override on 4/9/2009: Done (reportedly normal)

## 2018-12-14 NOTE — LETTER
December 14, 2018      Suleiman Fernandez Jr., NP  [address] [place] [address]           [facility]  [address] [facility]  [place] [address]  Phone: [phone]  Fax: [phone]          Patient: Duran Mccauley   MR Number: [id number]   YOB: 1960   Date of Visit: 12/14/2018       Dear Suleiman Fernandez Jr.:    Thank you for referring Duran Mccauley to me for evaluation. Attached you will find relevant portions of my assessment and plan of care.    If you have questions, please do not hesitate to call me. I look forward to following Duran Mccauley along with you.    Sincerely,    Patrick Cook MD    Enclosure  CC:  No Recipients    If you would like to receive this communication electronically, please contact [email or web address] or [phone] to request more information on [employer] access.    For providers and/or their staff who would like to refer a patient to [facility], please contact us through our one-stop-shop provider referral line, [facility], at [phone].    If you feel you have received this communication in error or would no longer like to receive these types of communications, please e-mail [email or web address]

## 2018-12-23 DIAGNOSIS — E78.00 PURE HYPERCHOLESTEROLEMIA: ICD-10-CM

## 2018-12-24 RX ORDER — ATORVASTATIN CALCIUM 20 MG/1
TABLET, FILM COATED ORAL
Qty: 90 TABLET | Refills: 3 | Status: SHIPPED | OUTPATIENT
Start: 2018-12-24 | End: 2020-01-07

## 2018-12-27 RX ORDER — METOPROLOL SUCCINATE 50 MG/1
TABLET, EXTENDED RELEASE ORAL
Qty: 60 TABLET | Refills: 0 | OUTPATIENT
Start: 2018-12-27

## 2019-01-11 DIAGNOSIS — Z01.818 PREOP EXAMINATION: Primary | ICD-10-CM

## 2019-01-24 ENCOUNTER — LAB VISIT (OUTPATIENT)
Dept: LAB | Facility: HOSPITAL | Age: 59
End: 2019-01-24
Attending: SURGERY
Payer: COMMERCIAL

## 2019-01-24 ENCOUNTER — HOSPITAL ENCOUNTER (OUTPATIENT)
Dept: VASCULAR SURGERY | Facility: CLINIC | Age: 59
Discharge: HOME | End: 2019-01-24
Attending: SURGERY
Payer: COMMERCIAL

## 2019-01-24 ENCOUNTER — OFFICE VISIT (OUTPATIENT)
Dept: VASCULAR SURGERY | Facility: CLINIC | Age: 59
End: 2019-01-24
Attending: SURGERY
Payer: COMMERCIAL

## 2019-01-24 VITALS
DIASTOLIC BLOOD PRESSURE: 84 MMHG | HEART RATE: 76 BPM | TEMPERATURE: 99 F | HEIGHT: 66 IN | WEIGHT: 119.06 LBS | SYSTOLIC BLOOD PRESSURE: 121 MMHG | BODY MASS INDEX: 19.13 KG/M2

## 2019-01-24 DIAGNOSIS — N18.6 ESRD (END STAGE RENAL DISEASE): ICD-10-CM

## 2019-01-24 DIAGNOSIS — Z01.818 PREOP EXAMINATION: ICD-10-CM

## 2019-01-24 DIAGNOSIS — Z01.818 PREOP EXAMINATION: Primary | ICD-10-CM

## 2019-01-24 DIAGNOSIS — Z99.2 ESRD (END STAGE RENAL DISEASE) ON DIALYSIS: Primary | ICD-10-CM

## 2019-01-24 DIAGNOSIS — N18.6 ESRD (END STAGE RENAL DISEASE) ON DIALYSIS: Primary | ICD-10-CM

## 2019-01-24 LAB
ALBUMIN SERPL BCP-MCNC: 4.2 G/DL
ALP SERPL-CCNC: 79 U/L
ALT SERPL W/O P-5'-P-CCNC: 9 U/L
ANION GAP SERPL CALC-SCNC: 14 MMOL/L
AST SERPL-CCNC: 13 U/L
BASOPHILS # BLD AUTO: 0.02 K/UL
BASOPHILS NFR BLD: 0.4 %
BILIRUB SERPL-MCNC: 0.7 MG/DL
BUN SERPL-MCNC: 32 MG/DL
CALCIUM SERPL-MCNC: 10.6 MG/DL
CHLORIDE SERPL-SCNC: 102 MMOL/L
CO2 SERPL-SCNC: 20 MMOL/L
CREAT SERPL-MCNC: 10.4 MG/DL
DIFFERENTIAL METHOD: ABNORMAL
EOSINOPHIL # BLD AUTO: 0.1 K/UL
EOSINOPHIL NFR BLD: 1.4 %
ERYTHROCYTE [DISTWIDTH] IN BLOOD BY AUTOMATED COUNT: 15.3 %
EST. GFR  (AFRICAN AMERICAN): 5.6 ML/MIN/1.73 M^2
EST. GFR  (NON AFRICAN AMERICAN): 4.8 ML/MIN/1.73 M^2
GLUCOSE SERPL-MCNC: 76 MG/DL
HCT VFR BLD AUTO: 47.8 %
HGB BLD-MCNC: 14.9 G/DL
IMM GRANULOCYTES # BLD AUTO: 0.02 K/UL
IMM GRANULOCYTES NFR BLD AUTO: 0.4 %
LYMPHOCYTES # BLD AUTO: 1.3 K/UL
LYMPHOCYTES NFR BLD: 22.8 %
MCH RBC QN AUTO: 28.2 PG
MCHC RBC AUTO-ENTMCNC: 31.2 G/DL
MCV RBC AUTO: 91 FL
MONOCYTES # BLD AUTO: 0.4 K/UL
MONOCYTES NFR BLD: 7.6 %
NEUTROPHILS # BLD AUTO: 3.7 K/UL
NEUTROPHILS NFR BLD: 67.4 %
NRBC BLD-RTO: 0 /100 WBC
PLATELET # BLD AUTO: 156 K/UL
PMV BLD AUTO: 11.5 FL
POTASSIUM SERPL-SCNC: 6 MMOL/L
PROT SERPL-MCNC: 8.5 G/DL
RBC # BLD AUTO: 5.28 M/UL
SODIUM SERPL-SCNC: 136 MMOL/L
WBC # BLD AUTO: 5.52 K/UL

## 2019-01-24 PROCEDURE — G0365 PR VESSEL MAPPING HEMO ACCESS: ICD-10-PCS | Mod: S$GLB,,, | Performed by: SURGERY

## 2019-01-24 PROCEDURE — 85025 COMPLETE CBC W/AUTO DIFF WBC: CPT

## 2019-01-24 PROCEDURE — 36415 COLL VENOUS BLD VENIPUNCTURE: CPT

## 2019-01-24 PROCEDURE — 99999 PR PBB SHADOW E&M-EST. PATIENT-LVL IV: ICD-10-PCS | Mod: PBBFAC,,, | Performed by: SURGERY

## 2019-01-24 PROCEDURE — 99999 PR PBB SHADOW E&M-EST. PATIENT-LVL IV: CPT | Mod: PBBFAC,,, | Performed by: SURGERY

## 2019-01-24 PROCEDURE — 99244 PR OFFICE CONSULTATION,LEVEL IV: ICD-10-PCS | Mod: S$GLB,,, | Performed by: SURGERY

## 2019-01-24 PROCEDURE — G0365 VESSEL MAPPING HEMO ACCESS: HCPCS | Mod: S$GLB,,, | Performed by: SURGERY

## 2019-01-24 PROCEDURE — 80053 COMPREHEN METABOLIC PANEL: CPT

## 2019-01-24 PROCEDURE — 99244 OFF/OP CNSLTJ NEW/EST MOD 40: CPT | Mod: S$GLB,,, | Performed by: SURGERY

## 2019-01-24 RX ORDER — SODIUM CHLORIDE 9 MG/ML
INJECTION, SOLUTION INTRAVENOUS CONTINUOUS
Status: CANCELLED | OUTPATIENT
Start: 2019-01-24

## 2019-01-24 RX ORDER — LIDOCAINE HYDROCHLORIDE 10 MG/ML
1 INJECTION, SOLUTION EPIDURAL; INFILTRATION; INTRACAUDAL; PERINEURAL ONCE
Status: CANCELLED | OUTPATIENT
Start: 2019-01-24 | End: 2019-01-24

## 2019-01-24 NOTE — LETTER
January 24, 2019      Patrick Cook MD  [address]  [place] [address]           [facility] - [facility]ascular [facility]  [address]  [place] [address]  Phone: [phone]  Fax: [phone]          Patient: Duran Mccauley   MR Number: [id number]   YOB: 1960   Date of Visit: 1/24/2019       Dear Dr. Patrick Cook:    Thank you for referring Duran Mccauley to me for evaluation. Attached you will find relevant portions of my assessment and plan of care.    If you have questions, please do not hesitate to call me. I look forward to following Duran Mccauley along with you.    Sincerely,    Chucho Miller MD    Enclosure  CC:  No Recipients    If you would like to receive this communication electronically, please contact [email or web address] or [phone] to request more information on [employer] access.    For providers and/or their staff who would like to refer a patient to [facility], please contact us through our one-stop-shop provider referral line, [facility] , at [phone].    If you feel you have received this communication in error or would no longer like to receive these types of communications, please e-mail [email or web address]

## 2019-01-24 NOTE — H&P (VIEW-ONLY)
Duran Mccauley  01/24/2019    HPI:  Patient is a 58 y.o. male with history of hypertension who is here today for evaluation of dialysis access. Patient had a right permacath placed in 9/2018 and has been dialyzing on a MWF schedule without issues. He sees Dr. De Luna with Nephrology.  He is right handed.  Not currently on blood thinners or antiplatelets.   No MI/stroke  Tobacco use: None    Past Medical History:   Diagnosis Date    Dialysis patient     MWF    Eczema     Hepatitis B     treated 2010s and negative RNA load afterward    Hyperlipidemia     Hypertension      Past Surgical History:   Procedure Laterality Date    COLONOSCOPY N/A 9/14/2018    [facility]formed [facility] at [facility] ENDO    TUNNELED VENOUS CATHETER PLACEMENT      HD cath     Family History   Problem Relation Age of Onset    Heart disease Father     Diabetes Brother      Social History     Socioeconomic History    Marital status:      Spouse name: Not on file    Number of children: Not on file    Years of education: Not on file    Highest education level: Not on file   Social Needs    Financial resource strain: Not on file    Food insecurity - worry: Not on file    Food insecurity - inability: Not on file    Transportation needs - medical: Not on file    Transportation needs - non-medical: Not on file   Occupational History    Not on file   Tobacco Use    Smoking status: Never Smoker    Smokeless tobacco: Never Used   Substance and Sexual Activity    Alcohol use: Yes     Alcohol/week: 0.6 oz     Types: 1 Cans of beer per week    Drug use: No    Sexual activity: Yes     Partners: Female   Other Topics Concern    Not on file   Social History Narrative    Not on file     Current Outpatient Medications on File Prior to Visit   Medication Sig    amLODIPine (NORVASC) 10 MG tablet Take 1 tablet (10 mg total) by mouth once daily.    atorvastatin (LIPITOR) 20 MG tablet Take 1 tablet (20 mg total) by mouth once daily.     atorvastatin (LIPITOR) 20 MG tablet TAKE 1 TABLET(20 MG) BY MOUTH EVERY DAY    olmesartan (BENICAR) 20 MG tablet Take 1 tablet (20 mg total) by mouth once daily.    omeprazole (PRILOSEC) 40 MG capsule TK 1 C PO QAM    metoprolol succinate (TOPROL-XL) 50 MG 24 hr tablet Take 1 tablet (50 mg total) by mouth 2 (two) times daily.     No current facility-administered medications on file prior to visit.        REVIEW OF SYSTEMS:  General: negative; ENT: negative; Allergy and Immunology: negative; Hematological and Lymphatic: Negative; Endocrine: negative; Respiratory: no cough, shortness of breath, or wheezing; Cardiovascular: no chest pain or dyspnea on exertion; Gastrointestinal: no abdominal pain/back, change in bowel habits, or bloody stools; Genito-Urinary: no dysuria, trouble voiding, or hematuria; Musculoskeletal: negative  Neurological: no TIA or stroke symptoms    PHYSICAL EXAM:   Right Arm BP - Sittin/84 (19 1048)  Left Arm BP - Sittin/97 (19 1048)  Pulse: 76  Temp: 98.6 °F (37 °C)      General appearance:  Alert, well appearing, and in no distress   Neurological: Normal speech, no focal findings noted; CN II - XII grossly intact                Mental status:  Oriented to person, place, and time                 Neck: Supple, no significant adenopathy; thyroid is not enlarged                  No carotid bruits can be auscultated                Chest:  Clear to auscultation, no wheezes, rales or rhonchi, symmetric air entry     No use of accessory muscles             Cardiac: Normal rate and regular rhythm, S1 and S2 normal; PMI non-displaced          Abdomen: Soft, nontender, nondistended, no masses or organomegaly     No rebound tenderness noted; bowel sounds normal           Extremities:   2+ brachial and radial pulses bilaterally     No pedal edema     No ulcerations    Lab Results:  Lab Results   Component Value Date    K 4.9 2018    K 4.8 2018    K 4.4 2018     CREATININE 11.1 (H) 11/25/2018    CREATININE 7.8 (H) 11/01/2018    CREATININE 6.0 (H) 09/24/2018     Lab Results   Component Value Date    WBC 18.00 (H) 11/25/2018    WBC 11.63 11/01/2018    WBC 12.55 09/22/2018    HCT 26.6 (L) 11/25/2018    HCT 21.2 (L) 11/01/2018    HCT 22.1 (L) 09/22/2018     11/25/2018     (L) 11/01/2018     (L) 09/22/2018     Lab Results   Component Value Date    HGBA1C 4.5 09/13/2018    HGBA1C 4.6 12/04/2015     IMAGING:  Results:                    Right             Left  _____________________________________________________________                    Diameter [mm]     Diameter [mm]  Cephalic Vein:      Proximal Arm      2.4               2.3  Antecubital Fossa 2.9               2.8  Proximal Forearm  2.4               2.7  Wrist             1.5               1.2    Basilic Vein:       Axilla            5.3               4.9  Proximal Arm      3.5               3.4  Antecubital Fossa 2.6               3.3  Middle Forearm    1.9               -    Brachial veins:in mm. Proximal: Rt.-5.1    , Lt.-5.2    Distal: Rt.- 4.5   , Lt.- 4.2.    Report Summary:  Impression:   Mapped and measured veins of the upper extremities. Cephalic veins measured with tourniquet applied. The left basilic vein could not be visualized at the mid forearm.    IMP/PLAN:  58 y.o. male with a history of renal failure requiring dialysis.  He has been dialyzed via a RIJ permacath for several months.  His left basilic vein is suitable for AVF construction.  He understands the risks and benefits and wishes to proceed.     1) Plan for LUE brachio-basilic AVF in the coming weeks.     Chucho Miller MD  Vascular & Endovascular Surgery

## 2019-01-24 NOTE — PROGRESS NOTES
Duran Mccauley  01/24/2019    HPI:  Patient is a 58 y.o. male with history of hypertension who is here today for evaluation of dialysis access. Patient had a right permacath placed in 9/2018 and has been dialyzing on a MWF schedule without issues. He sees Dr. De Luna with Nephrology.  He is right handed.  Not currently on blood thinners or antiplatelets.   No MI/stroke  Tobacco use: None    Past Medical History:   Diagnosis Date    Dialysis patient     MWF    Eczema     Hepatitis B     treated 2010s and negative RNA load afterward    Hyperlipidemia     Hypertension      Past Surgical History:   Procedure Laterality Date    COLONOSCOPY N/A 9/14/2018    Performed by Viri Blair MD at [facility] ENDO    TUNNELED VENOUS CATHETER PLACEMENT      HD cath     Family History   Problem Relation Age of Onset    Heart disease Father     Diabetes Brother      Social History     Socioeconomic History    Marital status:      Spouse name: Not on file    Number of children: Not on file    Years of education: Not on file    Highest education level: Not on file   Social Needs    Financial resource strain: Not on file    Food insecurity - worry: Not on file    Food insecurity - inability: Not on file    Transportation needs - medical: Not on file    Transportation needs - non-medical: Not on file   Occupational History    Not on file   Tobacco Use    Smoking status: Never Smoker    Smokeless tobacco: Never Used   Substance and Sexual Activity    Alcohol use: Yes     Alcohol/week: 0.6 oz     Types: 1 Cans of beer per week    Drug use: No    Sexual activity: Yes     Partners: Female   Other Topics Concern    Not on file   Social History Narrative    Not on file     Current Outpatient Medications on File Prior to Visit   Medication Sig    amLODIPine (NORVASC) 10 MG tablet Take 1 tablet (10 mg total) by mouth once daily.    atorvastatin (LIPITOR) 20 MG tablet Take 1 tablet (20 mg total) by mouth once daily.     atorvastatin (LIPITOR) 20 MG tablet TAKE 1 TABLET(20 MG) BY MOUTH EVERY DAY    olmesartan (BENICAR) 20 MG tablet Take 1 tablet (20 mg total) by mouth once daily.    omeprazole (PRILOSEC) 40 MG capsule TK 1 C PO QAM    metoprolol succinate (TOPROL-XL) 50 MG 24 hr tablet Take 1 tablet (50 mg total) by mouth 2 (two) times daily.     No current facility-administered medications on file prior to visit.        REVIEW OF SYSTEMS:  General: negative; ENT: negative; Allergy and Immunology: negative; Hematological and Lymphatic: Negative; Endocrine: negative; Respiratory: no cough, shortness of breath, or wheezing; Cardiovascular: no chest pain or dyspnea on exertion; Gastrointestinal: no abdominal pain/back, change in bowel habits, or bloody stools; Genito-Urinary: no dysuria, trouble voiding, or hematuria; Musculoskeletal: negative  Neurological: no TIA or stroke symptoms    PHYSICAL EXAM:   Right Arm BP - Sittin/84 (19 1048)  Left Arm BP - Sittin/97 (19 1048)  Pulse: 76  Temp: 98.6 °F (37 °C)      General appearance:  Alert, well appearing, and in no distress   Neurological: Normal speech, no focal findings noted; CN II - XII grossly intact                Mental status:  Oriented to person, place, and time                 Neck: Supple, no significant adenopathy; thyroid is not enlarged                  No carotid bruits can be auscultated                Chest:  Clear to auscultation, no wheezes, rales or rhonchi, symmetric air entry     No use of accessory muscles             Cardiac: Normal rate and regular rhythm, S1 and S2 normal; PMI non-displaced          Abdomen: Soft, nontender, nondistended, no masses or organomegaly     No rebound tenderness noted; bowel sounds normal           Extremities:   2+ brachial and radial pulses bilaterally     No pedal edema     No ulcerations    Lab Results:  Lab Results   Component Value Date    K 4.9 2018    K 4.8 2018    K 4.4 2018     CREATININE 11.1 (H) 11/25/2018    CREATININE 7.8 (H) 11/01/2018    CREATININE 6.0 (H) 09/24/2018     Lab Results   Component Value Date    WBC 18.00 (H) 11/25/2018    WBC 11.63 11/01/2018    WBC 12.55 09/22/2018    HCT 26.6 (L) 11/25/2018    HCT 21.2 (L) 11/01/2018    HCT 22.1 (L) 09/22/2018     11/25/2018     (L) 11/01/2018     (L) 09/22/2018     Lab Results   Component Value Date    HGBA1C 4.5 09/13/2018    HGBA1C 4.6 12/04/2015     IMAGING:  Results:                    Right             Left  _____________________________________________________________                    Diameter [mm]     Diameter [mm]  Cephalic Vein:      Proximal Arm      2.4               2.3  Antecubital Fossa 2.9               2.8  Proximal Forearm  2.4               2.7  Wrist             1.5               1.2    Basilic Vein:       Axilla            5.3               4.9  Proximal Arm      3.5               3.4  Antecubital Fossa 2.6               3.3  Middle Forearm    1.9               -    Brachial veins:in mm. Proximal: Rt.-5.1    , Lt.-5.2    Distal: Rt.- 4.5   , Lt.- 4.2.    Report Summary:  Impression:   Mapped and measured veins of the upper extremities. Cephalic veins measured with tourniquet applied. The left basilic vein could not be visualized at the mid forearm.    IMP/PLAN:  58 y.o. male with a history of renal failure requiring dialysis.  He has been dialyzed via a RIJ permacath for several months.  His left basilic vein is suitable for AVF construction.  He understands the risks and benefits and wishes to proceed.     1) Plan for LUE brachio-basilic AVF in the coming weeks.     Chucho Miller MD  Vascular & Endovascular Surgery

## 2019-02-06 ENCOUNTER — PATIENT MESSAGE (OUTPATIENT)
Dept: FAMILY MEDICINE | Facility: CLINIC | Age: 59
End: 2019-02-06

## 2019-02-06 ENCOUNTER — ANESTHESIA EVENT (OUTPATIENT)
Dept: SURGERY | Facility: HOSPITAL | Age: 59
End: 2019-02-06
Payer: COMMERCIAL

## 2019-02-06 ENCOUNTER — TELEPHONE (OUTPATIENT)
Dept: VASCULAR SURGERY | Facility: CLINIC | Age: 59
End: 2019-02-06

## 2019-02-06 RX ORDER — IBUPROFEN 200 MG
1 CAPSULE ORAL
COMMUNITY
End: 2019-04-11

## 2019-02-06 RX ORDER — LANOLIN ALCOHOL/MO/W.PET/CERES
100 CREAM (GRAM) TOPICAL DAILY
COMMUNITY

## 2019-02-06 NOTE — TELEPHONE ENCOUNTER
Please call the patient and verify, BP is running that low without taking any BP medication? How long has he been off of the medication? We should schedule him a follow-up to discuss this further. I am happy to see him tomorrow, I would prefer he not wait until Dr. Cook returns.    Thanks!  RAUL LutherC

## 2019-02-06 NOTE — PRE-PROCEDURE INSTRUCTIONS
Preop instructions: NPO solids/ milk products after midnight or clears up to 2 hours before arrival (clear liquids are: water, apple juice, Gatorade & Jell-O), shower instructions, directions, leave all valuables at home, medication instructions for PM prior & am of procedure explained. Patient stated an understanding.     Patient denies any side effects or issues with anesthesia or sedation.

## 2019-02-07 ENCOUNTER — ANESTHESIA (OUTPATIENT)
Dept: SURGERY | Facility: HOSPITAL | Age: 59
End: 2019-02-07
Payer: COMMERCIAL

## 2019-02-07 ENCOUNTER — HOSPITAL ENCOUNTER (OUTPATIENT)
Facility: HOSPITAL | Age: 59
Discharge: HOME | End: 2019-02-07
Attending: SURGERY | Admitting: SURGERY
Payer: COMMERCIAL

## 2019-02-07 VITALS
OXYGEN SATURATION: 100 % | WEIGHT: 119 LBS | BODY MASS INDEX: 19.13 KG/M2 | DIASTOLIC BLOOD PRESSURE: 95 MMHG | RESPIRATION RATE: 16 BRPM | TEMPERATURE: 99 F | HEART RATE: 71 BPM | HEIGHT: 66 IN | SYSTOLIC BLOOD PRESSURE: 160 MMHG

## 2019-02-07 DIAGNOSIS — N18.6 END STAGE RENAL FAILURE ON DIALYSIS: Primary | ICD-10-CM

## 2019-02-07 DIAGNOSIS — Z99.2 ESRD (END STAGE RENAL DISEASE) ON DIALYSIS: ICD-10-CM

## 2019-02-07 DIAGNOSIS — N18.6 ESRD (END STAGE RENAL DISEASE) ON DIALYSIS: ICD-10-CM

## 2019-02-07 DIAGNOSIS — N18.6 ESRD (END STAGE RENAL DISEASE): ICD-10-CM

## 2019-02-07 DIAGNOSIS — Z99.2 END STAGE RENAL FAILURE ON DIALYSIS: Primary | ICD-10-CM

## 2019-02-07 LAB
ANION GAP SERPL CALC-SCNC: 14 MMOL/L
BUN SERPL-MCNC: 29 MG/DL
CALCIUM SERPL-MCNC: 9.9 MG/DL
CHLORIDE SERPL-SCNC: 100 MMOL/L
CO2 SERPL-SCNC: 27 MMOL/L
CREAT SERPL-MCNC: 8.7 MG/DL
EST. GFR  (AFRICAN AMERICAN): 6.9 ML/MIN/1.73 M^2
EST. GFR  (NON AFRICAN AMERICAN): 6 ML/MIN/1.73 M^2
GLUCOSE SERPL-MCNC: 77 MG/DL
POTASSIUM SERPL-SCNC: 4.1 MMOL/L
SODIUM SERPL-SCNC: 141 MMOL/L

## 2019-02-07 PROCEDURE — 71000033 HC RECOVERY, INTIAL HOUR: Performed by: SURGERY

## 2019-02-07 PROCEDURE — 25000003 PHARM REV CODE 250: Performed by: STUDENT IN AN ORGANIZED HEALTH CARE EDUCATION/TRAINING PROGRAM

## 2019-02-07 PROCEDURE — 71000016 HC POSTOP RECOV ADDL HR: Performed by: SURGERY

## 2019-02-07 PROCEDURE — 71000015 HC POSTOP RECOV 1ST HR: Performed by: SURGERY

## 2019-02-07 PROCEDURE — 36821 AV FUSION DIRECT ANY SITE: CPT | Mod: ,,, | Performed by: SURGERY

## 2019-02-07 PROCEDURE — 94761 N-INVAS EAR/PLS OXIMETRY MLT: CPT

## 2019-02-07 PROCEDURE — 36000707: Performed by: SURGERY

## 2019-02-07 PROCEDURE — 63600175 PHARM REV CODE 636 W HCPCS: Performed by: NURSE ANESTHETIST, CERTIFIED REGISTERED

## 2019-02-07 PROCEDURE — 36821 PR ANASTOMOSIS,AV,ANY SITE: ICD-10-PCS | Mod: ,,, | Performed by: SURGERY

## 2019-02-07 PROCEDURE — D9220A PRA ANESTHESIA: ICD-10-PCS | Mod: ANES,,, | Performed by: ANESTHESIOLOGY

## 2019-02-07 PROCEDURE — D9220A PRA ANESTHESIA: Mod: ANES,,, | Performed by: ANESTHESIOLOGY

## 2019-02-07 PROCEDURE — 63600175 PHARM REV CODE 636 W HCPCS: Performed by: STUDENT IN AN ORGANIZED HEALTH CARE EDUCATION/TRAINING PROGRAM

## 2019-02-07 PROCEDURE — 37000008 HC ANESTHESIA 1ST 15 MINUTES: Performed by: SURGERY

## 2019-02-07 PROCEDURE — 25000003 PHARM REV CODE 250: Performed by: NURSE ANESTHETIST, CERTIFIED REGISTERED

## 2019-02-07 PROCEDURE — 37000009 HC ANESTHESIA EA ADD 15 MINS: Performed by: SURGERY

## 2019-02-07 PROCEDURE — 63600175 PHARM REV CODE 636 W HCPCS: Performed by: SURGERY

## 2019-02-07 PROCEDURE — D9220A PRA ANESTHESIA: Mod: CRNA,,, | Performed by: NURSE ANESTHETIST, CERTIFIED REGISTERED

## 2019-02-07 PROCEDURE — D9220A PRA ANESTHESIA: ICD-10-PCS | Mod: CRNA,,, | Performed by: NURSE ANESTHETIST, CERTIFIED REGISTERED

## 2019-02-07 PROCEDURE — 80048 BASIC METABOLIC PNL TOTAL CA: CPT

## 2019-02-07 PROCEDURE — 36000706: Performed by: SURGERY

## 2019-02-07 RX ORDER — PAPAVERINE HYDROCHLORIDE 30 MG/ML
INJECTION INTRAMUSCULAR; INTRAVENOUS
Status: DISCONTINUED | OUTPATIENT
Start: 2019-02-07 | End: 2019-02-07 | Stop reason: HOSPADM

## 2019-02-07 RX ORDER — MIDAZOLAM HYDROCHLORIDE 1 MG/ML
0.5 INJECTION INTRAMUSCULAR; INTRAVENOUS
Status: DISCONTINUED | OUTPATIENT
Start: 2019-02-07 | End: 2019-02-07 | Stop reason: HOSPADM

## 2019-02-07 RX ORDER — HYDROCODONE BITARTRATE AND ACETAMINOPHEN 5; 325 MG/1; MG/1
1 TABLET ORAL EVERY 6 HOURS PRN
Qty: 15 TABLET | Refills: 0 | Status: SHIPPED | OUTPATIENT
Start: 2019-02-07 | End: 2019-04-11

## 2019-02-07 RX ORDER — PROTAMINE SULFATE 10 MG/ML
INJECTION, SOLUTION INTRAVENOUS
Status: DISCONTINUED | OUTPATIENT
Start: 2019-02-07 | End: 2019-02-07

## 2019-02-07 RX ORDER — LIDOCAINE HYDROCHLORIDE 10 MG/ML
1 INJECTION, SOLUTION EPIDURAL; INFILTRATION; INTRACAUDAL; PERINEURAL ONCE
Status: COMPLETED | OUTPATIENT
Start: 2019-02-07 | End: 2019-02-07

## 2019-02-07 RX ORDER — SODIUM CHLORIDE 9 MG/ML
INJECTION, SOLUTION INTRAVENOUS CONTINUOUS
Status: DISCONTINUED | OUTPATIENT
Start: 2019-02-07 | End: 2019-02-07 | Stop reason: HOSPADM

## 2019-02-07 RX ORDER — HEPARIN SODIUM 1000 [USP'U]/ML
INJECTION, SOLUTION INTRAVENOUS; SUBCUTANEOUS
Status: DISCONTINUED | OUTPATIENT
Start: 2019-02-07 | End: 2019-02-07

## 2019-02-07 RX ORDER — ONDANSETRON 2 MG/ML
4 INJECTION INTRAMUSCULAR; INTRAVENOUS DAILY PRN
Status: DISCONTINUED | OUTPATIENT
Start: 2019-02-07 | End: 2019-02-07 | Stop reason: HOSPADM

## 2019-02-07 RX ORDER — SODIUM CHLORIDE 9 MG/ML
INJECTION, SOLUTION INTRAVENOUS CONTINUOUS PRN
Status: DISCONTINUED | OUTPATIENT
Start: 2019-02-07 | End: 2019-02-07

## 2019-02-07 RX ORDER — PROPOFOL 10 MG/ML
VIAL (ML) INTRAVENOUS CONTINUOUS PRN
Status: DISCONTINUED | OUTPATIENT
Start: 2019-02-07 | End: 2019-02-07

## 2019-02-07 RX ORDER — LIDOCAINE HCL/PF 100 MG/5ML
SYRINGE (ML) INTRAVENOUS
Status: DISCONTINUED | OUTPATIENT
Start: 2019-02-07 | End: 2019-02-07

## 2019-02-07 RX ORDER — CEFAZOLIN SODIUM 1 G/3ML
2 INJECTION, POWDER, FOR SOLUTION INTRAMUSCULAR; INTRAVENOUS
Status: COMPLETED | OUTPATIENT
Start: 2019-02-07 | End: 2019-02-07

## 2019-02-07 RX ORDER — HEPARIN SODIUM 1000 [USP'U]/ML
INJECTION, SOLUTION INTRAVENOUS; SUBCUTANEOUS
Status: DISCONTINUED | OUTPATIENT
Start: 2019-02-07 | End: 2019-02-07 | Stop reason: HOSPADM

## 2019-02-07 RX ORDER — LIDOCAINE HYDROCHLORIDE 10 MG/ML
1 INJECTION, SOLUTION EPIDURAL; INFILTRATION; INTRACAUDAL; PERINEURAL ONCE
Status: DISCONTINUED | OUTPATIENT
Start: 2019-02-07 | End: 2019-02-07 | Stop reason: HOSPADM

## 2019-02-07 RX ORDER — MIDAZOLAM HYDROCHLORIDE 1 MG/ML
INJECTION, SOLUTION INTRAMUSCULAR; INTRAVENOUS
Status: DISCONTINUED | OUTPATIENT
Start: 2019-02-07 | End: 2019-02-07

## 2019-02-07 RX ORDER — FENTANYL CITRATE 50 UG/ML
25 INJECTION, SOLUTION INTRAMUSCULAR; INTRAVENOUS EVERY 5 MIN PRN
Status: DISCONTINUED | OUTPATIENT
Start: 2019-02-07 | End: 2019-02-07 | Stop reason: HOSPADM

## 2019-02-07 RX ORDER — SODIUM CHLORIDE 0.9 % (FLUSH) 0.9 %
3 SYRINGE (ML) INJECTION
Status: DISCONTINUED | OUTPATIENT
Start: 2019-02-07 | End: 2019-02-07 | Stop reason: HOSPADM

## 2019-02-07 RX ORDER — HYDROCODONE BITARTRATE AND ACETAMINOPHEN 5; 325 MG/1; MG/1
1 TABLET ORAL EVERY 4 HOURS PRN
Status: DISCONTINUED | OUTPATIENT
Start: 2019-02-07 | End: 2019-02-07 | Stop reason: HOSPADM

## 2019-02-07 RX ORDER — CEFAZOLIN SODIUM 1 G/3ML
2 INJECTION, POWDER, FOR SOLUTION INTRAMUSCULAR; INTRAVENOUS
Status: DISCONTINUED | OUTPATIENT
Start: 2019-02-07 | End: 2019-02-07 | Stop reason: HOSPADM

## 2019-02-07 RX ORDER — FENTANYL CITRATE 50 UG/ML
INJECTION, SOLUTION INTRAMUSCULAR; INTRAVENOUS
Status: DISCONTINUED | OUTPATIENT
Start: 2019-02-07 | End: 2019-02-07

## 2019-02-07 RX ADMIN — HYDROCODONE BITARTRATE AND ACETAMINOPHEN 1 TABLET: 5; 325 TABLET ORAL at 10:02

## 2019-02-07 RX ADMIN — HEPARIN SODIUM 4000 UNITS: 1000 INJECTION, SOLUTION INTRAVENOUS; SUBCUTANEOUS at 08:02

## 2019-02-07 RX ADMIN — FENTANYL CITRATE 25 MCG: 50 INJECTION, SOLUTION INTRAMUSCULAR; INTRAVENOUS at 07:02

## 2019-02-07 RX ADMIN — LIDOCAINE HYDROCHLORIDE 10 MG: 10 INJECTION, SOLUTION EPIDURAL; INFILTRATION; INTRACAUDAL; PERINEURAL at 05:02

## 2019-02-07 RX ADMIN — LIDOCAINE HYDROCHLORIDE 50 MG: 20 INJECTION, SOLUTION INTRAVENOUS at 07:02

## 2019-02-07 RX ADMIN — CEFAZOLIN 2 G: 330 INJECTION, POWDER, FOR SOLUTION INTRAMUSCULAR; INTRAVENOUS at 07:02

## 2019-02-07 RX ADMIN — MIDAZOLAM HYDROCHLORIDE 1 MG: 1 INJECTION, SOLUTION INTRAMUSCULAR; INTRAVENOUS at 07:02

## 2019-02-07 RX ADMIN — PROTAMINE SULFATE 40 MG: 10 INJECTION, SOLUTION INTRAVENOUS at 08:02

## 2019-02-07 RX ADMIN — MEPIVACAINE HYDROCHLORIDE 40 ML: 15 INJECTION, SOLUTION EPIDURAL; INFILTRATION at 07:02

## 2019-02-07 RX ADMIN — HEPARIN SODIUM 4000 UNITS: 1000 INJECTION, SOLUTION INTRAVENOUS; SUBCUTANEOUS at 07:02

## 2019-02-07 RX ADMIN — MIDAZOLAM HYDROCHLORIDE 1 MG: 1 INJECTION, SOLUTION INTRAMUSCULAR; INTRAVENOUS at 06:02

## 2019-02-07 RX ADMIN — PROPOFOL 25 MCG/KG/MIN: 10 INJECTION, EMULSION INTRAVENOUS at 07:02

## 2019-02-07 RX ADMIN — SODIUM CHLORIDE: 0.9 INJECTION, SOLUTION INTRAVENOUS at 06:02

## 2019-02-07 RX ADMIN — PROTAMINE SULFATE 30 MG: 10 INJECTION, SOLUTION INTRAVENOUS at 09:02

## 2019-02-07 NOTE — ANESTHESIA POSTPROCEDURE EVALUATION
"Anesthesia Post Evaluation    Patient: Duran Mccauley    Procedure(s) Performed: Procedure(s) (LRB):  CREATION, AV FISTULA, LUE brachiobasilic (Left)    Final Anesthesia Type: regional  Patient location during evaluation: PACU  Patient participation: Yes- Able to Participate  Level of consciousness: awake and alert  Post-procedure vital signs: reviewed and stable  Pain management: adequate  Airway patency: patent  PONV status at discharge: No PONV  Anesthetic complications: no      Cardiovascular status: blood pressure returned to baseline  Respiratory status: unassisted  Hydration status: euvolemic  Follow-up not needed.        Visit Vitals  BP (!) 160/95   Pulse 71   Temp 37 °C (98.6 °F) (Temporal)   Resp 16   Ht 5' 6" (1.676 m)   Wt 54 kg (119 lb)   SpO2 100%   BMI 19.21 kg/m²       Pain/Ardiane Score: Pain Rating Prior to Med Admin: 4 (2/7/2019 10:12 AM)  Adriane Score: 10 (2/7/2019 10:15 AM)        "

## 2019-02-07 NOTE — DISCHARGE INSTRUCTIONS
Arteriovenous (AV) Fistula for Dialysis  An AV fistula is a connection between an artery and a vein. For this procedure, an AV fistula is surgically created using an artery and a vein in your arm. (Your healthcare provider will let you know if another site is to be used.) When the artery and vein are joined, blood flow increases from the artery into the vein. As a result, the vein gets bigger over time. The enlarged vein provides easier access to the blood for a treatment for kidney failure (dialysis). This sheet explains the procedure and what to expect.     An AV fistula increases blood flow from the artery into the vein. Over time, the vein becomes stronger and enlarged.   Preparing for the procedure  Prepare as you have been told. In addition:  · Tell your healthcare provider about all the medicines you take. This includes all over-the-counter and prescription medicines, and street drugs. It also includes herbs, vitamins, and other supplements. You may need to stop taking some or all of them before the procedure.  · Follow any directions youre given for not eating or drinking before the procedure.  · Do not allow anyone to draw blood from or take blood pressure on the arm that will have the fistula before the procedure.  The day of the procedure  The procedure takes about 1 to 2 hours. Youll likely go home the same day.  Before the procedure begins:  · An IV (intravenous) line is put into a vein in the arm or hand not being used for the procedure. This line supplies fluids and medicines.  · To keep you free of pain during the procedure, youre given general anesthesia. This medicine puts you into a state like a deep sleep through the procedure. Or a nerve block may be used. This medicine numbs the arm. With it, you may also be given medicine that makes you relaxed and drowsy through the procedure.  During the procedure:  · The skin over your arm may be injected with numbing medicine.  · One or more small  cuts (incisions) are then made through the numbed skin. This depends on the size of your arm and the depth of the vein in your arm.  · The vein is attached to the selected artery.  · Any incisions made are then closed with stitches (sutures), staples, surgical glue, or strips of surgical tape.  After the procedure:  · Youll be asked to keep your arm raised (elevated) as often as possible for at least a week after the procedure.  · Youll be given medicines to manage pain as needed.  · Your arm and hand will be checked to make sure blood is flowing through the fistula properly. The feeling of blood rushing through the fistula is called a thrill. It is somewhat similar to the purring of a cat. Youll be taught how to check for this feeling each day to make sure there are no problems with your fistula. Youll also be taught how to care for your fistula at home.  · When its time for you to leave the hospital, have an adult family member or friend ready to drive you home.  Recovering at home  Once at home, follow all of the instructions youve been given. Be sure to:  · Take all medicines as directed.  · Care for your incision as instructed.  · Check for signs of infection at the incision site (see below).  · Avoid heavy lifting and strenuous activities as directed.  · Monitor and care for your fistula as instructed.  · Do your hand and arm exercises as instructed. This usually involves squeezing a ball in your hand for a few minutes each hour.  Call your healthcare provider if you have any of the following:  · Fever of 100.4°F (38°C) or higher  · Signs of infection at the incision site, such as increased redness or swelling, warmth, worsening pain, bleeding, or bad-smelling drainage  · You cant feel a thrill (the vibration of blood going through your arm)  · Pain or numbness in your fingers, hand, or arm  · Bleeding, redness, or warmth around your fistula  · Sudden bulging of the fistula (more than usual; a slight  bulge is normal)   Follow-Up  Your healthcare provider will check your fistula within 1 to 2 weeks after the procedure. It will likely take about 6 to 8 weeks for the fistula to enlarge enough to start dialysis. After that, make sure the fistula is checked each time you have dialysis. Your healthcare provider may also suggest checkups every 6 months.  Risks and possible complications include:  · The fistula not working properly  · Long wait before the fistula is ready (up to 6 months)  · Coldness or numbness in the hand (due to blood flowing away from the hand and into the fistula)  · An unsightly bump under the skin (due to enlargement of the fistula)  · Prolonged bleeding from the fistula after dialysis  · Narrowing or weakening of the blood vessels used for the fistula  · Formation of blood clots in the blood vessels used for the fistula  · Risks of anesthesia or any other medicines used during the procedure   Living with an AV Fistula  A problem, such as a narrowing (stricture) of the vein or an infection, can make the fistula unusable. If this happens, you may need other treatments to repair or make a new fistula. To protect your fistula, follow these and any other guidelines youre given:  · Check your fistula as often as your healthcare provider says. If you cant feel your thrill, let your provider know right away.  · Make sure your fistula is checked before each dialysis treatment.  · Dont let anyone draw blood from or take blood pressure on the arm that has the fistula.  · Wash your hands often and keep the area around your fistula clean.  · Dont sleep on the arm that has the fistula.  · Dont wear tight jewelry or a watch on the arm with your fistula.  · Protect your fistula from cuts, scrapes, or blows.  Date Last Reviewed: 1/1/2017  © 5372-9249 [employer]. [address] [place] [address], [place] [address]. All rights reserved. This information is not intended as a substitute for professional  medical care. Always follow your healthcare professional's instructions.

## 2019-02-07 NOTE — TRANSFER OF CARE
"Anesthesia Transfer of Care Note    Patient: Duran Mccauley    Procedure(s) Performed: Procedure(s) (LRB):  CREATION, AV FISTULA, LUE brachiobasilic (Left)    Patient location: PACU    Anesthesia Type: regional and MAC    Transport from OR: Transported from OR on room air with adequate spontaneous ventilation    Post pain: adequate analgesia    Post assessment: no apparent anesthetic complications and tolerated procedure well    Post vital signs: stable    Level of consciousness: awake, alert and oriented    Nausea/Vomiting: no nausea/vomiting    Complications: none    Transfer of care protocol was followed      Last vitals:   Visit Vitals  BP (!) 149/92 (BP Location: Right arm, Patient Position: Lying)   Pulse 69   Temp 36.7 °C (98 °F) (Oral)   Resp 18   Ht 5' 6" (1.676 m)   Wt 54 kg (119 lb)   SpO2 100%   BMI 19.21 kg/m²     "

## 2019-02-07 NOTE — ANESTHESIA PREPROCEDURE EVALUATION
02/07/2019  Duran Mccauley is a 58 y.o., male with ESRD here for HD access.    Pre-operative evaluation for Procedure(s) (LRB):  CREATION, AV FISTULA, LUE brachiobasilic (Left)        Patient Active Problem List   Diagnosis    Essential (primary) hypertension    Pure hypercholesterolemia    Erectile dysfunction    ESRD on hemodialysis    Chronic gout of multiple sites    Thrombocytopenia    Weight loss, unintentional    Malnutrition of moderate degree    Anemia    Leukocytosis    Preop examination    ESRD (end stage renal disease)       Review of patient's allergies indicates:  No Known Allergies    No current facility-administered medications on file prior to encounter.      Current Outpatient Medications on File Prior to Encounter   Medication Sig Dispense Refill    amLODIPine (NORVASC) 10 MG tablet Take 1 tablet (10 mg total) by mouth once daily. 30 tablet 11    atorvastatin (LIPITOR) 20 MG tablet TAKE 1 TABLET(20 MG) BY MOUTH EVERY DAY 90 tablet 3    calcium citrate (CALCITRATE) 200 mg (950 mg) tablet Take 1 tablet by mouth 3 (three) times daily with meals.      cyanocobalamin (VITAMIN B-12) 1000 MCG tablet Take 100 mcg by mouth once daily.      olmesartan (BENICAR) 20 MG tablet Take 1 tablet (20 mg total) by mouth once daily. 90 tablet 0    omeprazole (PRILOSEC) 40 MG capsule TK 1 C PO QAM  1    metoprolol succinate (TOPROL-XL) 50 MG 24 hr tablet Take 1 tablet (50 mg total) by mouth 2 (two) times daily. 60 tablet 0       Past Surgical History:   Procedure Laterality Date    COLONOSCOPY N/A 9/14/2018    Performed [facility] at [facility] ENDO    RENAL BIOPSY      TUNNELED VENOUS CATHETER PLACEMENT      HD cath       Social History     Socioeconomic History    Marital status:      Spouse name: Not on file    Number of children: Not on file    Years of education: Not on  file    Highest education level: Not on file   Social Needs    Financial resource strain: Not on file    Food insecurity - worry: Not on file    Food insecurity - inability: Not on file    Transportation needs - medical: Not on file    Transportation needs - non-medical: Not on file   Occupational History    Not on file   Tobacco Use    Smoking status: Never Smoker    Smokeless tobacco: Never Used   Substance and Sexual Activity    Alcohol use: Yes     Alcohol/week: 0.6 oz     Types: 1 Cans of beer per week    Drug use: No    Sexual activity: Yes     Partners: Female   Other Topics Concern    Not on file   Social History Narrative    Not on file         CBC: No results for input(s): WBC, RBC, HGB, HCT, PLT, MCV, MCH, MCHC in the last 72 hours.    CMP:   Recent Labs     02/07/19  0553      K 4.1      CO2 27   BUN 29*   CREATININE 8.7*   GLU 77   CALCIUM 9.9       INR  No results for input(s): PT, INR, PROTIME, APTT in the last 72 hours.              2D Echo:  No results found for this or any previous visit.        Anesthesia Evaluation    I have reviewed the Patient Summary Reports.    I have reviewed the Nursing Notes.   I have reviewed the Medications.     Review of Systems  Anesthesia Hx:  No problems with previous Anesthesia    Hematology/Oncology:  Hematology Normal   Oncology Normal     EENT/Dental:EENT/Dental Normal   Cardiovascular:   Hypertension    Pulmonary:  Pulmonary Normal    Renal/:   Chronic Renal Disease, ESRD, Dialysis    Hepatic/GI:   Liver Disease, Hepatitis, B    Musculoskeletal:  Musculoskeletal Normal    Neurological:  Neurology Normal    Endocrine:  Endocrine Normal    Dermatological:  Skin Normal    Psych:  Psychiatric Normal           Physical Exam  General:  Well nourished, Cachexia    Airway/Jaw/Neck:  Airway Findings: Mouth Opening: Normal Tongue: Normal  General Airway Assessment: Adult  Mallampati: II  Jaw/Neck Findings:  Neck ROM: Normal ROM      Eyes/Ears/Nose:  Eyes/Ears/Nose Findings:    Dental:  Dental Findings: In tact, upper partial dentures, lower partial dentures   Chest/Lungs:  Chest/Lungs Findings: Clear to auscultation, Normal Respiratory Rate     Heart/Vascular:  Heart Findings: Rate: Normal  Rhythm: Regular Rhythm  Sounds: Normal  Heart Murmur  Vascular Findings:        Mental Status:  Mental Status Findings:  Cooperative, Alert and Oriented         Anesthesia Plan  Type of Anesthesia, risks & benefits discussed:  Anesthesia Type:  general, regional  Patient's Preference: Regional  Intra-op Monitoring Plan: standard ASA monitors  Intra-op Monitoring Plan Comments:   Post Op Pain Control Plan:   Post Op Pain Control Plan Comments:   Induction:   IV  Beta Blocker:  Patient is not currently on a Beta-Blocker (No further documentation required).       Informed Consent: Patient understands risks and agrees with Anesthesia plan.  Questions answered. Anesthesia consent signed with patient.  ASA Score: 4     Day of Surgery Review of History & Physical:    H&P update referred to the surgeon.     Anesthesia Plan Notes: Discussed all aspects of regional anesthesia and other appropriate anesthetic options    Pt agrees with plan and all questions were answered        Ready For Surgery From Anesthesia Perspective.

## 2019-02-07 NOTE — PLAN OF CARE
Awake and alert. VSS. Denies pain or nausea. Tolerating liquids well. DC instructions given to patient and family and they verbalize understanding.

## 2019-02-07 NOTE — ANESTHESIA PROCEDURE NOTES
Supraclavicular Brachial Plexus Single Injection Block    Patient location during procedure: OR    Reason for block: primary anesthetic   Diagnosis: L AVF    Start time: 2/7/2019 7:03 AM  Timeout: 2/7/2019 7:01 AM   End time: 2/7/2019 7:10 AM  Staffing  Anesthesiologist: Harriett Moreno MD  Resident/CRNA: Otto Day MD  Performed: resident/CRNA   Preanesthetic Checklist  Completed: patient identified, site marked, surgical consent, pre-op evaluation, timeout performed, IV checked, risks and benefits discussed and monitors and equipment checked  Peripheral Block  Patient position: supine  Prep: ChloraPrep  Patient monitoring: heart rate, cardiac monitor, continuous pulse ox, continuous capnometry and frequent blood pressure checks  Block type: supraclavicular  Laterality: left  Injection technique: single shot  Needle  Needle type: Stimuplex   Needle gauge: 22 G  Needle length: 2 in  Needle localization: anatomical landmarks and ultrasound guidance   -ultrasound image captured on disc.  Assessment  Injection assessment: negative aspiration, negative parasthesia and local visualized surrounding nerve  Paresthesia pain: none  Heart rate change: no  Slow fractionated injection: yes  Additional Notes  Mepivacaine 1.5% 30cc administered.  Intercostal brachial field block performed with mepivacaine 1.5% 10ml for additional coverage.    VSS.  See anesthesia record.  Patient tolerated procedure well.

## 2019-02-08 NOTE — OP NOTE
2/7/2019      Indications: Duran Mccauley is a 58 y.o. male  With end stage renal disease and need for long-term dialysis access.    Pre-operative Diagnosis:  Stage V CKD in need of AV access.    Post-operative Diagnosis: same.    Operation: Creation of L radiocephalic AVF    Surgeon: Chucho Miller MD      Assistants: Woodrow Clarke MD    Anesthesia: IV regional    Findings: 1. Cephalic vein adequate size   2. Palpable thrill at completion of surgery          Procedure Details:  The patient was seen in the Holding Room. The risks, benefits, complications, treatment options, and expected outcomes were discussed with the patient. The patient concurred with the proposed plan, giving informed consent.  The site of surgery properly noted/marked. A Time Out was held and the above information confirmed.     Ultrasound was used to evaluate the cephalic vein. At the wrist it was noted to be 4 mm up to the antecubital fossa. The patient was then prepped and draped in the usual sterile fashion with the L arm out. A longitudinal incision was made between the radial artery and cephalic vein. The cephalic vein was identified, dissected proximally and distally, and vessel loops were placed around it. The radial artery was then dissected out and proximal and distal control were obtained by placing vessel loops around it. Papaverine was injected onto both. The cephalic vein was then marked and divided distally and a 2-0 silk tie placed on the distal stump. The vein was flushed and noted to dilate well. The end was spatulated and fashioned appropriately. Yasargil clips were then placed proximally and distally on the radial artery. An arteriotomy was made with a 11 blade scalpel and extended with Velasquez scissors.The anastomosis was then completed with a 6-0 Prolene suture in a continuous running manner. After completion of the anastomosis, the clips were released. Hemostasis was secured. A pulse was felt, but not thrill. The  fistula was examined and noted to be twisted. The anastomosis was taken down, the end of the vein freshened, and the anastomosis again performed with a running 6-0 prolene suture. This time a good thrill was noted in the fistula and the incision was then closed in three layers, with a deep 2-0 vicryl, then subcutaneous tissue with 3-0 Vicryl and skin with 4-0 Monocryl. Steristrip was applied. A strong thrill was palpated.      EBL::  <10mL           Specimens: None           Complications:  None; patient tolerated the procedure well.           Disposition: PACU - hemodynamically stable.    Woodrow Clarke MD   Fellow, Vascular/Endovascular Surgery

## 2019-02-19 NOTE — BRIEF OP NOTE
Brief Operative Note  Date: 02/19/2019    Surgeon(s) and Role:     * Chucho Miller MD - Primary     * Woodrow Clarke MD - Fellow    Pre-op Diagnosis:  ESRD (end stage renal disease) on dialysis [N18.6, Z99.2];    Post-op Diagnosis:  Same    Procedure(s):  1) LUE radiocephalic AVF    Surgeon: Chucho Miller MD  Vascular & Endovascular Surgery       Assistant: FRANK Clarke MD    Anesthesia: Regional    Findings/Key Components:  Successful AVF creation, good thrill at completion    EBL: Minimal         Specimens (From admission, onward)    None          I attest to being present for the procedure and performing the case.  Chucho Miller MD  Vascular & Endovascular Surgery     Discharge Note  SUMMARY    Admit Date: 2/7/2019    Attending Physician: Chucho Miller MD  Vascular & Endovascular Surgery       Discharge Physician: Chucho Miller MD  Vascular & Endovascular Surgery       Discharge Date: 02/19/2019    Final Diagnosis: ESRD (end stage renal disease) on dialysis [N18.6, Z99.2]    Disposition: Home or self-care    Patient Instructions:   Discharge Medication List as of 2/7/2019 10:56 AM      START taking these medications    Details   HYDROcodone-acetaminophen (NORCO) 5-325 mg per tablet Take 1 tablet by mouth every 6 (six) hours as needed for Pain., Starting Thu 2/7/2019, Print         CONTINUE these medications which have NOT CHANGED    Details   amLODIPine (NORVASC) 10 MG tablet Take 1 tablet (10 mg total) by mouth once daily., Starting Tue 9/25/2018, Until Wed 9/25/2019, Normal      atorvastatin (LIPITOR) 20 MG tablet TAKE 1 TABLET(20 MG) BY MOUTH EVERY DAY, Normal      calcium citrate (CALCITRATE) 200 mg (950 mg) tablet Take 1 tablet by mouth 3 (three) times daily with meals., Historical Med      cyanocobalamin (VITAMIN B-12) 1000 MCG tablet Take 100 mcg by mouth once daily., Historical Med      metoprolol succinate (TOPROL-XL) 50 MG 24 hr tablet Take 1 tablet (50 mg total) by mouth 2 (two) times  daily., Starting Mon 11/26/2018, Until Wed 12/26/2018, Normal      olmesartan (BENICAR) 20 MG tablet Take 1 tablet (20 mg total) by mouth once daily., Starting Mon 11/26/2018, Until Sun 2/24/2019, Normal      omeprazole (PRILOSEC) 40 MG capsule TK 1 C PO QAM, Historical Med             Diet:  Resume pre-operative diet    Activity:  Ad latonya    Follow-up:  Follow-up in clinic with Dr Miller within 1-2 weeks; please call clinic nurse at

## 2019-03-07 ENCOUNTER — HOSPITAL ENCOUNTER (OUTPATIENT)
Dept: VASCULAR SURGERY | Facility: CLINIC | Age: 59
Discharge: HOME | End: 2019-03-07
Attending: SURGERY
Payer: COMMERCIAL

## 2019-03-07 ENCOUNTER — OFFICE VISIT (OUTPATIENT)
Dept: VASCULAR SURGERY | Facility: CLINIC | Age: 59
End: 2019-03-07
Attending: SURGERY
Payer: COMMERCIAL

## 2019-03-07 VITALS
TEMPERATURE: 98 F | BODY MASS INDEX: 19.13 KG/M2 | SYSTOLIC BLOOD PRESSURE: 169 MMHG | HEART RATE: 72 BPM | WEIGHT: 119.06 LBS | DIASTOLIC BLOOD PRESSURE: 87 MMHG | HEIGHT: 66 IN

## 2019-03-07 DIAGNOSIS — N18.6 ESRD (END STAGE RENAL DISEASE) ON DIALYSIS: Primary | ICD-10-CM

## 2019-03-07 DIAGNOSIS — Z99.2 END STAGE RENAL FAILURE ON DIALYSIS: Primary | ICD-10-CM

## 2019-03-07 DIAGNOSIS — N18.6 END STAGE RENAL FAILURE ON DIALYSIS: Primary | ICD-10-CM

## 2019-03-07 DIAGNOSIS — Z99.2 ESRD (END STAGE RENAL DISEASE) ON DIALYSIS: Primary | ICD-10-CM

## 2019-03-07 DIAGNOSIS — Z99.2 END STAGE RENAL FAILURE ON DIALYSIS: ICD-10-CM

## 2019-03-07 DIAGNOSIS — N18.6 END STAGE RENAL FAILURE ON DIALYSIS: ICD-10-CM

## 2019-03-07 PROCEDURE — 93990 PR DUPLEX HEMODIALYSIS ACCESS: ICD-10-PCS | Mod: S$GLB,,, | Performed by: SURGERY

## 2019-03-07 PROCEDURE — 99024 PR POST-OP FOLLOW-UP VISIT: ICD-10-PCS | Mod: S$GLB,,, | Performed by: SURGERY

## 2019-03-07 PROCEDURE — 99999 PR PBB SHADOW E&M-EST. PATIENT-LVL III: CPT | Mod: PBBFAC,,, | Performed by: SURGERY

## 2019-03-07 PROCEDURE — 93990 DOPPLER FLOW TESTING: CPT | Mod: S$GLB,,, | Performed by: SURGERY

## 2019-03-07 PROCEDURE — 99024 POSTOP FOLLOW-UP VISIT: CPT | Mod: S$GLB,,, | Performed by: SURGERY

## 2019-03-07 PROCEDURE — 99999 PR PBB SHADOW E&M-EST. PATIENT-LVL III: ICD-10-PCS | Mod: PBBFAC,,, | Performed by: SURGERY

## 2019-03-07 RX ORDER — CALCIUM ACETATE 667 MG/1
CAPSULE ORAL
COMMUNITY
Start: 2019-01-25

## 2019-03-07 NOTE — PROGRESS NOTES
Duran Mccauley  03/07/2019    HPI:  Patient is a 58 y.o. male with history of hypertension who is here today for evaluation of dialysis access. Patient had a right permacath placed in 9/2018 and has been dialyzing on a MWF schedule without issues. He sees Dr. De Luna with Nephrology.  He is right handed.  Not currently on blood thinners or antiplatelets.   No MI/stroke  Tobacco use: None    Past Medical History:   Diagnosis Date    Dialysis patient     MWF    Eczema     Hepatitis B     treated 2010s and negative RNA load afterward    Hyperlipidemia     Hypertension      Past Surgical History:   Procedure Laterality Date    COLONOSCOPY N/A 9/14/2018    Performed by [facility] Blair MD at [facility] ENDO    CREATION, AV FISTULA, LUE brachiobasilic Left 2/7/2019    Performed [facility] Miller [facility] at [facility] OR [address] [place] [address]    RENAL BIOPSY      TUNNELED VENOUS CATHETER PLACEMENT      HD cath     Family History   Problem Relation Age of Onset    Heart disease Father     Diabetes Brother      Social History     Socioeconomic History    Marital status:      Spouse name: Not on file    Number of children: Not on file    Years of education: Not on file    Highest education level: Not on file   Social Needs    Financial resource strain: Not on file    Food insecurity - worry: Not on file    Food insecurity - inability: Not on file    Transportation needs - medical: Not on file    Transportation needs - non-medical: Not on file   Occupational History    Not on file   Tobacco Use    Smoking status: Never Smoker    Smokeless tobacco: Never Used   Substance and Sexual Activity    Alcohol use: Yes     Alcohol/week: 0.6 oz     Types: 1 Cans of beer per week    Drug use: No    Sexual activity: Yes     Partners: Female   Other Topics Concern    Not on file   Social History Narrative    Not on file     Current Outpatient Medications on File Prior to Visit   Medication Sig    amLODIPine (NORVASC) 10 MG tablet  Take 1 tablet (10 mg total) by mouth once daily.    atorvastatin (LIPITOR) 20 MG tablet TAKE 1 TABLET(20 MG) BY MOUTH EVERY DAY    calcium acetate (PHOSLO) 667 mg capsule     calcium citrate (CALCITRATE) 200 mg (950 mg) tablet Take 1 tablet by mouth 3 (three) times daily with meals.    cyanocobalamin (VITAMIN B-12) 1000 MCG tablet Take 100 mcg by mouth once daily.    HYDROcodone-acetaminophen (NORCO) 5-325 mg per tablet Take 1 tablet by mouth every 6 (six) hours as needed for Pain.    omeprazole (PRILOSEC) 40 MG capsule TK 1 C PO QAM    metoprolol succinate (TOPROL-XL) 50 MG 24 hr tablet Take 1 tablet (50 mg total) by mouth 2 (two) times daily.    olmesartan (BENICAR) 20 MG tablet Take 1 tablet (20 mg total) by mouth once daily.     No current facility-administered medications on file prior to visit.        REVIEW OF SYSTEMS:  General: negative; ENT: negative; Allergy and Immunology: negative; Hematological and Lymphatic: Negative; Endocrine: negative; Respiratory: no cough, shortness of breath, or wheezing; Cardiovascular: no chest pain or dyspnea on exertion; Gastrointestinal: no abdominal pain/back, change in bowel habits, or bloody stools; Genito-Urinary: no dysuria, trouble voiding, or hematuria; Musculoskeletal: negative  Neurological: no TIA or stroke symptoms    PHYSICAL EXAM:   Right Arm BP - Sittin/87 (19 0920)  Left Arm BP - Sittin/82 (19 0920)  Pulse: 72  Temp: 97.6 °F (36.4 °C)      General appearance:  Alert, well appearing, and in no distress   Neurological: Normal speech, no focal findings noted; CN II - XII grossly intact                Mental status:  Oriented to person, place, and time                 Neck: Supple, no significant adenopathy; thyroid is not enlarged                  No carotid bruits can be auscultated                Chest:  Clear to auscultation, no wheezes, rales or rhonchi, symmetric air entry     No use of accessory muscles              Cardiac: Normal rate and regular rhythm, S1 and S2 normal; PMI non-displaced          Abdomen: Soft, nontender, nondistended, no masses or organomegaly     No rebound tenderness noted; bowel sounds normal           Extremities:   2+ brachial and radial pulses bilaterally     No pedal edema     No ulcerations    Lab Results:  Lab Results   Component Value Date    K 4.1 02/07/2019    K 6.0 (H) 01/24/2019    K 4.9 11/25/2018    CREATININE 8.7 (H) 02/07/2019    CREATININE 10.4 (H) 01/24/2019    CREATININE 11.1 (H) 11/25/2018     Lab Results   Component Value Date    WBC 5.52 01/24/2019    WBC 18.00 (H) 11/25/2018    WBC 11.63 11/01/2018    HCT 47.8 01/24/2019    HCT 26.6 (L) 11/25/2018    HCT 21.2 (L) 11/01/2018     01/24/2019     11/25/2018     (L) 11/01/2018     Lab Results   Component Value Date    HGBA1C 4.5 09/13/2018    HGBA1C 4.6 12/04/2015     IMAGING:  3/2019  Indication:  -End Stage Renal Disease on dialysis.  Results:  Dialysis Access: Left Arm Radiocephalic fistula                    PSV [cm/s]        Flow Volume [ml/min]  ________________________________________________________________  Proximal to Anastomosis (artery)    208               -  Anastomosis       550               -  Proximal AVF      166               -  Middle AVF        154               1126  Distal AVF        183               -                      Diam [mm]         Depth [mm]        Thickness [mm]    PSV [cm/s]        Flow Volume [ml/min]  _______________________________________________________________________________________  Prox Outflow vein 5.4               1.5               -                 -                 -  Middle Outflow vein                 6.3               2.2               -                 -                 -  Distal Outflow vein                 6.4               2.1               -                 -                 -    ACF- 164 cm/s.    Report Summary:  Impression:   Color flow duplex exam reveals  a patent Left radiocephalic AV fistula with no evidence of a hemodynamically significant stenosis. Volume flow at mid forearm level measures 1126 ml/min.    IMP/PLAN:  58 y.o. male with a history of renal failure requiring dialysis.  He has been dialyzed via a RIJ permacath for several months.  He underwent left radiocephalic AVF creation at the wrist 4 weeks ago and the vein is maturing well.      1) RTC in four weeks with repeat duplex, and likely initiation of transition from catheter to AVF     Chucho Miller MD  Vascular & Endovascular Surgery

## 2019-04-11 ENCOUNTER — OFFICE VISIT (OUTPATIENT)
Dept: VASCULAR SURGERY | Facility: CLINIC | Age: 59
End: 2019-04-11
Attending: SURGERY
Payer: COMMERCIAL

## 2019-04-11 ENCOUNTER — HOSPITAL ENCOUNTER (OUTPATIENT)
Dept: VASCULAR SURGERY | Facility: CLINIC | Age: 59
Discharge: HOME | End: 2019-04-11
Attending: SURGERY
Payer: COMMERCIAL

## 2019-04-11 VITALS
TEMPERATURE: 98 F | WEIGHT: 117 LBS | BODY MASS INDEX: 18.8 KG/M2 | HEART RATE: 72 BPM | DIASTOLIC BLOOD PRESSURE: 91 MMHG | HEIGHT: 66 IN | SYSTOLIC BLOOD PRESSURE: 189 MMHG

## 2019-04-11 DIAGNOSIS — N18.6 END STAGE RENAL FAILURE ON DIALYSIS: ICD-10-CM

## 2019-04-11 DIAGNOSIS — Z99.2 END STAGE RENAL FAILURE ON DIALYSIS: ICD-10-CM

## 2019-04-11 DIAGNOSIS — Z99.2 ESRD (END STAGE RENAL DISEASE) ON DIALYSIS: Primary | ICD-10-CM

## 2019-04-11 DIAGNOSIS — N18.6 ESRD (END STAGE RENAL DISEASE) ON DIALYSIS: Primary | ICD-10-CM

## 2019-04-11 PROCEDURE — 99024 PR POST-OP FOLLOW-UP VISIT: ICD-10-PCS | Mod: S$GLB,,, | Performed by: SURGERY

## 2019-04-11 PROCEDURE — 99024 POSTOP FOLLOW-UP VISIT: CPT | Mod: S$GLB,,, | Performed by: SURGERY

## 2019-04-11 PROCEDURE — 93990 PR DUPLEX HEMODIALYSIS ACCESS: ICD-10-PCS | Mod: 26,S$PBB,, | Performed by: SURGERY

## 2019-04-11 PROCEDURE — 99999 PR PBB SHADOW E&M-EST. PATIENT-LVL III: ICD-10-PCS | Mod: PBBFAC,,, | Performed by: SURGERY

## 2019-04-11 PROCEDURE — 93990 DOPPLER FLOW TESTING: CPT | Mod: 26,S$PBB,, | Performed by: SURGERY

## 2019-04-11 PROCEDURE — 99999 PR PBB SHADOW E&M-EST. PATIENT-LVL III: CPT | Mod: PBBFAC,,, | Performed by: SURGERY

## 2019-04-11 PROCEDURE — 93990 DOPPLER FLOW TESTING: CPT | Mod: PBBFAC | Performed by: SURGERY

## 2019-04-11 RX ORDER — ASCORBIC ACID, THIAMINE, RIBOFLAVIN, NIACINAMIDE, PYRIDOXINE, FOLIC ACID, COBALAMIN, BIOTIN, PANTOTHENIC ACID 100; 1.5; 1.7; 20; 10; 1; 6; 300; 1 MG/1; MG/1; MG/1; MG/1; MG/1; MG/1; UG/1; UG/1; MG/1
1 TABLET, COATED ORAL DAILY
Refills: 3 | COMMUNITY
Start: 2019-04-01

## 2019-04-11 RX ORDER — ERGOCALCIFEROL 1.25 MG/1
CAPSULE ORAL
COMMUNITY
Start: 2019-03-14

## 2019-04-11 NOTE — PROGRESS NOTES
Duran Mccauley  04/11/2019    HPI:  Patient is a 59 y.o. male s/p left radiocephalic AVF creation in 2/2019.  Doing quite well.      Not currently on blood thinners or antiplatelets.   No MI/stroke  Tobacco use: None    Past Medical History:   Diagnosis Date    Dialysis patient     MWF    Eczema     Hepatitis B     treated 2010s and negative RNA load afterward    Hyperlipidemia     Hypertension      Past Surgical History:   Procedure Laterality Date    COLONOSCOPY N/A 9/14/2018    Performed by [facility] Blair MD at [facility] ENDO    CREATION, AV FISTULA, LUE brachiobasilic Left 2/7/2019    Performed by [facility]ucho Millre MD at [facility] OR [facility] FLR    RENAL BIOPSY      TUNNELED VENOUS CATHETER PLACEMENT      HD cath     Family History   Problem Relation Age of Onset    Heart disease Father     Diabetes Brother      Social History     Socioeconomic History    Marital status:      Spouse name: Not on file    Number of children: Not on file    Years of education: Not on file    Highest education level: Not on file   Occupational History    Not on file   Social Needs    Financial resource strain: Not on file    Food insecurity:     Worry: Not on file     Inability: Not on file    Transportation needs:     Medical: Not on file     Non-medical: Not on file   Tobacco Use    Smoking status: Never Smoker    Smokeless tobacco: Never Used   Substance and Sexual Activity    Alcohol use: Yes     Alcohol/week: 0.6 oz     Types: 1 Cans of beer per week    Drug use: No    Sexual activity: Yes     Partners: Female   Lifestyle    Physical activity:     Days per week: Not on file     Minutes per session: Not on file    Stress: Not on file   Relationships    Social connections:     Talks on phone: Not on file     Gets together: Not on file     Attends Congregation service: Not on file     Active member of club or organization: Not on file     Attends meetings of clubs or organizations: Not on file     Relationship  status: Not on file   Other Topics Concern    Not on file   Social History Narrative    Not on file     Current Outpatient Medications on File Prior to Visit   Medication Sig    atorvastatin (LIPITOR) 20 MG tablet TAKE 1 TABLET(20 MG) BY MOUTH EVERY DAY    calcium acetate (PHOSLO) 667 mg capsule     cyanocobalamin (VITAMIN B-12) 1000 MCG tablet Take 100 mcg by mouth once daily.    DIALYVITE 100-1 mg Tab Take 1 tablet by mouth once daily.    VITAMIN D2 50,000 unit capsule     olmesartan (BENICAR) 20 MG tablet Take 1 tablet (20 mg total) by mouth once daily.    [DISCONTINUED] amLODIPine (NORVASC) 10 MG tablet Take 1 tablet (10 mg total) by mouth once daily.    [DISCONTINUED] calcium citrate (CALCITRATE) 200 mg (950 mg) tablet Take 1 tablet by mouth 3 (three) times daily with meals.    [DISCONTINUED] HYDROcodone-acetaminophen (NORCO) 5-325 mg per tablet Take 1 tablet by mouth every 6 (six) hours as needed for Pain.    [DISCONTINUED] metoprolol succinate (TOPROL-XL) 50 MG 24 hr tablet Take 1 tablet (50 mg total) by mouth 2 (two) times daily.    [DISCONTINUED] omeprazole (PRILOSEC) 40 MG capsule TK 1 C PO QAM     No current facility-administered medications on file prior to visit.        REVIEW OF SYSTEMS:  General: negative; ENT: negative; Allergy and Immunology: negative; Hematological and Lymphatic: Negative; Endocrine: negative; Respiratory: no cough, shortness of breath, or wheezing; Cardiovascular: no chest pain or dyspnea on exertion; Gastrointestinal: no abdominal pain/back, change in bowel habits, or bloody stools; Genito-Urinary: no dysuria, trouble voiding, or hematuria; Musculoskeletal: negative  Neurological: no TIA or stroke symptoms    PHYSICAL EXAM:   Right Arm BP - Sittin/91 (19 0954)  Pulse: 72  Temp: 98 °F (36.7 °C)      General appearance:  Alert, well appearing, and in no distress   Neurological: Normal speech, no focal findings noted; CN II - XII grossly intact                 Mental status:  Oriented to person, place, and time                 Neck: Supple, no significant adenopathy; thyroid is not enlarged                  No carotid bruits can be auscultated                Chest:  Clear to auscultation, no wheezes, rales or rhonchi, symmetric air entry     No use of accessory muscles             Cardiac: Normal rate and regular rhythm, S1 and S2 normal; PMI non-displaced          Abdomen: Soft, nontender, nondistended, no masses or organomegaly     No rebound tenderness noted; bowel sounds normal           Extremities:   2+ brachial and radial pulses bilaterally     No pedal edema     No ulcerations     L forearm - strong thrill throughout AVF     Lab Results:  Lab Results   Component Value Date    K 4.1 02/07/2019    K 6.0 (H) 01/24/2019    K 4.9 11/25/2018    CREATININE 8.7 (H) 02/07/2019    CREATININE 10.4 (H) 01/24/2019    CREATININE 11.1 (H) 11/25/2018     Lab Results   Component Value Date    WBC 5.52 01/24/2019    WBC 18.00 (H) 11/25/2018    WBC 11.63 11/01/2018    HCT 47.8 01/24/2019    HCT 26.6 (L) 11/25/2018    HCT 21.2 (L) 11/01/2018     01/24/2019     11/25/2018     (L) 11/01/2018     Lab Results   Component Value Date    HGBA1C 4.5 09/13/2018    HGBA1C 4.6 12/04/2015     IMAGING:  3/2019  Indication:  -End Stage Renal Disease on dialysis.  Results:  Dialysis Access: Left Arm Radiocephalic fistula                    PSV [cm/s]        Flow Volume [ml/min]  ________________________________________________________________  Proximal to Anastomosis (artery)    208               -  Anastomosis       550               -  Proximal AVF      166               -  Middle AVF        154               1126  Distal AVF        183               -                      Diam [mm]         Depth [mm]        Thickness [mm]    PSV [cm/s]        Flow Volume [ml/min]  _______________________________________________________________________________________  Prox Outflow vein 5.4                1.5               -                 -                 -  Middle Outflow vein                 6.3               2.2               -                 -                 -  Distal Outflow vein                 6.4               2.1               -                 -                 -    ACF- 164 cm/s.    Report Summary:  Impression:   Color flow duplex exam reveals a patent Left radiocephalic AV fistula with no evidence of a hemodynamically significant stenosis. Volume flow at mid forearm level measures 1126 ml/min.    IMP/PLAN:  59 y.o. male with a history of renal failure requiring dialysis.  He has been dialyzed via a RIJ permacath for several months.  He underwent left radiocephalic AVF creation at the wrist in 2/2019 and the AVF has matured well and is ready for cannulation.     1) Initiate HD via AVF per protocol (given to patient)  2) RTC in 3-4 weeks for permacath removal.     Chucho Miller MD  Vascular & Endovascular Surgery

## 2019-04-17 ENCOUNTER — TELEPHONE (OUTPATIENT)
Dept: VASCULAR SURGERY | Facility: CLINIC | Age: 59
End: 2019-04-17

## 2019-04-17 DIAGNOSIS — Z99.2 END STAGE RENAL FAILURE ON DIALYSIS: Primary | ICD-10-CM

## 2019-04-17 DIAGNOSIS — N18.6 END STAGE RENAL FAILURE ON DIALYSIS: Primary | ICD-10-CM

## 2019-04-17 NOTE — TELEPHONE ENCOUNTER
----- Message from Bhavana Aldana sent at 4/17/2019 10:54 AM CDT -----  Contact: Patient  Good Morning,    Mr. Mccauley called this morning regarding his dialysis appointment. He wanted Dr. Miller to know that this was the second time that they used a needle in his arm. When they removed the needle his arm instantly started to swell. He was told that this was blood under his skin. He stated that his arm is very swollen from his wrist to his elbow. He was given an ice pack to apply to his arm for 20 mins. This happen around 9:00 am and his arm is still completely swollen. He was sure if he needed to come in and see Dr. Miller or if he could do something at home to ease he swelling.    Please give him a call at [phone].    Thanks,    Bhavana Aldana'

## 2019-05-02 ENCOUNTER — HOSPITAL ENCOUNTER (OUTPATIENT)
Dept: VASCULAR SURGERY | Facility: CLINIC | Age: 59
Discharge: HOME | End: 2019-05-02
Attending: SURGERY
Payer: COMMERCIAL

## 2019-05-02 ENCOUNTER — OFFICE VISIT (OUTPATIENT)
Dept: VASCULAR SURGERY | Facility: CLINIC | Age: 59
End: 2019-05-02
Attending: SURGERY
Payer: COMMERCIAL

## 2019-05-02 VITALS
HEIGHT: 66 IN | HEART RATE: 72 BPM | DIASTOLIC BLOOD PRESSURE: 79 MMHG | WEIGHT: 112.44 LBS | BODY MASS INDEX: 18.07 KG/M2 | TEMPERATURE: 98 F | SYSTOLIC BLOOD PRESSURE: 138 MMHG

## 2019-05-02 DIAGNOSIS — T82.858A ARTERIOVENOUS FISTULA STENOSIS, INITIAL ENCOUNTER: Primary | ICD-10-CM

## 2019-05-02 DIAGNOSIS — N18.6 END STAGE RENAL FAILURE ON DIALYSIS: ICD-10-CM

## 2019-05-02 DIAGNOSIS — I77.0 AV (ARTERIOVENOUS FISTULA): Primary | ICD-10-CM

## 2019-05-02 DIAGNOSIS — Z99.2 END STAGE RENAL FAILURE ON DIALYSIS: ICD-10-CM

## 2019-05-02 PROCEDURE — 93990 DOPPLER FLOW TESTING: CPT | Mod: PBBFAC | Performed by: SURGERY

## 2019-05-02 PROCEDURE — 3008F PR BODY MASS INDEX (BMI) DOCUMENTED: ICD-10-PCS | Mod: CPTII,S$GLB,, | Performed by: SURGERY

## 2019-05-02 PROCEDURE — 99212 PR OFFICE/OUTPT VISIT, EST, LEVL II, 10-19 MIN: ICD-10-PCS | Mod: S$GLB,,, | Performed by: SURGERY

## 2019-05-02 PROCEDURE — 99212 OFFICE O/P EST SF 10 MIN: CPT | Mod: S$GLB,,, | Performed by: SURGERY

## 2019-05-02 PROCEDURE — 99999 PR PBB SHADOW E&M-EST. PATIENT-LVL III: ICD-10-PCS | Mod: PBBFAC,,, | Performed by: SURGERY

## 2019-05-02 PROCEDURE — 93990 DOPPLER FLOW TESTING: CPT | Mod: 26,S$PBB,, | Performed by: SURGERY

## 2019-05-02 PROCEDURE — 99999 PR PBB SHADOW E&M-EST. PATIENT-LVL III: CPT | Mod: PBBFAC,,, | Performed by: SURGERY

## 2019-05-02 PROCEDURE — 93990 PR DUPLEX HEMODIALYSIS ACCESS: ICD-10-PCS | Mod: 26,S$PBB,, | Performed by: SURGERY

## 2019-05-02 PROCEDURE — 3008F BODY MASS INDEX DOCD: CPT | Mod: CPTII,S$GLB,, | Performed by: SURGERY

## 2019-05-02 NOTE — H&P (VIEW-ONLY)
Duran Mccauley  05/02/2019    HPI:  Patient is a 59 y.o. male s/p left radiocephalic AVF creation in 2/2019.  Doing quite well.  Present for removal of Permacath removal now that L radiocephalic AVF is in use for dialysis.     Not currently on blood thinners or antiplatelets.   No MI/stroke  Tobacco use: None    Past Medical History:   Diagnosis Date    Dialysis patient     MWF    Eczema     Hepatitis B     treated 2010s and negative RNA load afterward    Hyperlipidemia     Hypertension      Past Surgical History:   Procedure Laterality Date    COLONOSCOPY N/A 9/14/2018    [facility] ENDO    CREATION, AV FISTULA, LUE brachiobasilic Left 2/7/2019    [facility] F[facility]    RENAL BIOPSY      TUNNELED VENOUS CATHETER PLACEMENT      HD cath     Family History   Problem Relation Age of Onset    Heart disease Father     Diabetes Brother      Social History     Socioeconomic History    Marital status:      Spouse name: Not on file    Number of children: Not on file    Years of education: Not on file    Highest education level: Not on file   Occupational History    Not on file   Social Needs    Financial resource strain: Not on file    Food insecurity:     Worry: Not on file     Inability: Not on file    Transportation needs:     Medical: Not on file     Non-medical: Not on file   Tobacco Use    Smoking status: Never Smoker    Smokeless tobacco: Never Used   Substance and Sexual Activity    Alcohol use: Yes     Alcohol/week: 0.6 oz     Types: 1 Cans of beer per week    Drug use: No    Sexual activity: Yes     Partners: Female   Lifestyle    Physical activity:     Days per week: Not on file     Minutes per session: Not on file    Stress: Not on file   Relationships    Social connections:     Talks on phone: Not on file     Gets together: Not on file     Attends [employer] service: Not on file     Active member of club or  organization: Not on file     Attends meetings of clubs or organizations: Not on file     Relationship status: Not on file   Other Topics Concern    Not on file   Social History Narrative    Not on file     Current Outpatient Medications on File Prior to Visit   Medication Sig    atorvastatin (LIPITOR) 20 MG tablet TAKE 1 TABLET(20 MG) BY MOUTH EVERY DAY    calcium acetate (PHOSLO) 667 mg capsule     cyanocobalamin (VITAMIN B-12) 1000 MCG tablet Take 100 mcg by mouth once daily.    DIALYVITE 100-1 mg Tab Take 1 tablet by mouth once daily.    VITAMIN D2 50,000 unit capsule     olmesartan (BENICAR) 20 MG tablet Take 1 tablet (20 mg total) by mouth once daily.     No current facility-administered medications on file prior to visit.        REVIEW OF SYSTEMS:  General: negative; ENT: negative; Allergy and Immunology: negative; Hematological and Lymphatic: Negative; Endocrine: negative; Respiratory: no cough, shortness of breath, or wheezing; Cardiovascular: no chest pain or dyspnea on exertion; Gastrointestinal: no abdominal pain/back, change in bowel habits, or bloody stools; Genito-Urinary: no dysuria, trouble voiding, or hematuria; Musculoskeletal: negative  Neurological: no TIA or stroke symptoms    PHYSICAL EXAM:   Right Arm BP - Sittin/79 (19 0904)  Pulse: 72  Temp: 98.1 °F (36.7 °C)      General appearance:  Alert, well appearing, and in no distress   Neurological: Normal speech, no focal findings noted; CN II - XII grossly intact                Mental status:  Oriented to person, place, and time                 Neck: Supple, no significant adenopathy; thyroid is not enlarged                  No carotid bruits can be auscultated                Chest:  Clear to auscultation, no wheezes, rales or rhonchi, symmetric air entry     No use of accessory muscles             Cardiac: Normal rate and regular rhythm, S1 and S2 normal; PMI non-displaced          Abdomen: Soft, nontender, nondistended, no  masses or organomegaly     No rebound tenderness noted; bowel sounds normal           Extremities:   2+ brachial and radial pulses bilaterally     No pedal edema     No ulcerations     L forearm - strong thrill throughout AVF     Lab Results:  Lab Results   Component Value Date    K 4.1 02/07/2019    K 6.0 (H) 01/24/2019    K 4.9 11/25/2018    CREATININE 8.7 (H) 02/07/2019    CREATININE 10.4 (H) 01/24/2019    CREATININE 11.1 (H) 11/25/2018     Lab Results   Component Value Date    WBC 5.52 01/24/2019    WBC 18.00 (H) 11/25/2018    WBC 11.63 11/01/2018    HCT 47.8 01/24/2019    HCT 26.6 (L) 11/25/2018    HCT 21.2 (L) 11/01/2018     01/24/2019     11/25/2018     (L) 11/01/2018     Lab Results   Component Value Date    HGBA1C 4.5 09/13/2018    HGBA1C 4.6 12/04/2015     IMAGING:  3/2019  Indication:  -End Stage Renal Disease on dialysis.  Results:  Dialysis Access: Left Arm Radiocephalic fistula                    PSV [cm/s]        Flow Volume [ml/min]  ________________________________________________________________  Proximal to Anastomosis (artery)    208               -  Anastomosis       550               -  Proximal AVF      166               -  Middle AVF        154               1126  Distal AVF        183               -                      Diam [mm]         Depth [mm]        Thickness [mm]    PSV [cm/s]        Flow Volume [ml/min]  _______________________________________________________________________________________  Prox Outflow vein 5.4               1.5               -                 -                 -  Middle Outflow vein                 6.3               2.2               -                 -                 -  Distal Outflow vein                 6.4               2.1               -                 -                 -    ACF- 164 cm/s.    Report Summary:  Impression:   Color flow duplex exam reveals a patent Left radiocephalic AV fistula with no evidence of a hemodynamically  significant stenosis. Volume flow at mid forearm level measures 1126 ml/min.    IMP/PLAN:  59 y.o. male with a history of renal failure requiring dialysis.  He has been dialyzed via a RIJ permacath for several months.  He underwent left radiocephalic AVF creation at the wrist in 2/2019 and the AVF has matured well and is ready for cannulation. Present in clinic today for 3-4 week follow up for permacath removal.       1) RTC in 3-4 weeks for permacath removal.   2) procedure tolerated well without immediate complications, provided with appropriate wound care instructions      Dima Randall MD  Vascular Surgery

## 2019-05-02 NOTE — PROGRESS NOTES
Duran Mccauley  05/02/2019    HPI:  Patient is a 59 y.o. male s/p left radiocephalic AVF creation in 2/2019.  Doing quite well.  Present for removal of Permacath removal now that L radiocephalic AVF is in use for dialysis.     Not currently on blood thinners or antiplatelets.   No MI/stroke  Tobacco use: None    Past Medical History:   Diagnosis Date    Dialysis patient     MWF    Eczema     Hepatitis B     treated 2010s and negative RNA load afterward    Hyperlipidemia     Hypertension      Past Surgical History:   Procedure Laterality Date    COLONOSCOPY N/A 9/14/2018    Performed by Viri Blair MD at [facility] ENDO    CREATION, AV FISTULA, LUE brachiobasilic Left 2/7/2019    Performed by Chucho Miller [facility] at [facility] OR [facility]    RENAL BIOPSY      TUNNELED VENOUS CATHETER PLACEMENT      HD cath     Family History   Problem Relation Age of Onset    Heart disease Father     Diabetes Brother      Social History     Socioeconomic History    Marital status:      Spouse name: Not on file    Number of children: Not on file    Years of education: Not on file    Highest education level: Not on file   Occupational History    Not on file   Social Needs    Financial resource strain: Not on file    Food insecurity:     Worry: Not on file     Inability: Not on file    Transportation needs:     Medical: Not on file     Non-medical: Not on file   Tobacco Use    Smoking status: Never Smoker    Smokeless tobacco: Never Used   Substance and Sexual Activity    Alcohol use: Yes     Alcohol/week: 0.6 oz     Types: 1 Cans of beer per week    Drug use: No    Sexual activity: Yes     Partners: Female   Lifestyle    Physical activity:     Days per week: Not on file     Minutes per session: Not on file    Stress: Not on file   Relationships    Social connections:     Talks on phone: Not on file     Gets together: Not on file     Attends Sikh service: Not on file     Active member of club or  organization: Not on file     Attends meetings of clubs or organizations: Not on file     Relationship status: Not on file   Other Topics Concern    Not on file   Social History Narrative    Not on file     Current Outpatient Medications on File Prior to Visit   Medication Sig    atorvastatin (LIPITOR) 20 MG tablet TAKE 1 TABLET(20 MG) BY MOUTH EVERY DAY    calcium acetate (PHOSLO) 667 mg capsule     cyanocobalamin (VITAMIN B-12) 1000 MCG tablet Take 100 mcg by mouth once daily.    DIALYVITE 100-1 mg Tab Take 1 tablet by mouth once daily.    VITAMIN D2 50,000 unit capsule     olmesartan (BENICAR) 20 MG tablet Take 1 tablet (20 mg total) by mouth once daily.     No current facility-administered medications on file prior to visit.        REVIEW OF SYSTEMS:  General: negative; ENT: negative; Allergy and Immunology: negative; Hematological and Lymphatic: Negative; Endocrine: negative; Respiratory: no cough, shortness of breath, or wheezing; Cardiovascular: no chest pain or dyspnea on exertion; Gastrointestinal: no abdominal pain/back, change in bowel habits, or bloody stools; Genito-Urinary: no dysuria, trouble voiding, or hematuria; Musculoskeletal: negative  Neurological: no TIA or stroke symptoms    PHYSICAL EXAM:   Right Arm BP - Sittin/79 (19 0904)  Pulse: 72  Temp: 98.1 °F (36.7 °C)      General appearance:  Alert, well appearing, and in no distress   Neurological: Normal speech, no focal findings noted; CN II - XII grossly intact                Mental status:  Oriented to person, place, and time                 Neck: Supple, no significant adenopathy; thyroid is not enlarged                  No carotid bruits can be auscultated                Chest:  Clear to auscultation, no wheezes, rales or rhonchi, symmetric air entry     No use of accessory muscles             Cardiac: Normal rate and regular rhythm, S1 and S2 normal; PMI non-displaced          Abdomen: Soft, nontender, nondistended, no  masses or organomegaly     No rebound tenderness noted; bowel sounds normal           Extremities:   2+ brachial and radial pulses bilaterally     No pedal edema     No ulcerations     L forearm - strong thrill throughout AVF     Lab Results:  Lab Results   Component Value Date    K 4.1 02/07/2019    K 6.0 (H) 01/24/2019    K 4.9 11/25/2018    CREATININE 8.7 (H) 02/07/2019    CREATININE 10.4 (H) 01/24/2019    CREATININE 11.1 (H) 11/25/2018     Lab Results   Component Value Date    WBC 5.52 01/24/2019    WBC 18.00 (H) 11/25/2018    WBC 11.63 11/01/2018    HCT 47.8 01/24/2019    HCT 26.6 (L) 11/25/2018    HCT 21.2 (L) 11/01/2018     01/24/2019     11/25/2018     (L) 11/01/2018     Lab Results   Component Value Date    HGBA1C 4.5 09/13/2018    HGBA1C 4.6 12/04/2015     IMAGING:  3/2019  Indication:  -End Stage Renal Disease on dialysis.  Results:  Dialysis Access: Left Arm Radiocephalic fistula                    PSV [cm/s]        Flow Volume [ml/min]  ________________________________________________________________  Proximal to Anastomosis (artery)    208               -  Anastomosis       550               -  Proximal AVF      166               -  Middle AVF        154               1126  Distal AVF        183               -                      Diam [mm]         Depth [mm]        Thickness [mm]    PSV [cm/s]        Flow Volume [ml/min]  _______________________________________________________________________________________  Prox Outflow vein 5.4               1.5               -                 -                 -  Middle Outflow vein                 6.3               2.2               -                 -                 -  Distal Outflow vein                 6.4               2.1               -                 -                 -    ACF- 164 cm/s.    Report Summary:  Impression:   Color flow duplex exam reveals a patent Left radiocephalic AV fistula with no evidence of a hemodynamically  significant stenosis. Volume flow at mid forearm level measures 1126 ml/min.    IMP/PLAN:  59 y.o. male with a history of renal failure requiring dialysis.  He has been dialyzed via a RIJ permacath for several months.  He underwent left radiocephalic AVF creation at the wrist in 2/2019 and the AVF has matured well and is ready for cannulation. Present in clinic today for 3-4 week follow up for permacath removal.       1) RTC in 3-4 weeks for permacath removal.   2) procedure tolerated well without immediate complications, provided with appropriate wound care instructions      Dima Randall MD  Vascular Surgery

## 2019-05-09 ENCOUNTER — HOSPITAL ENCOUNTER (OUTPATIENT)
Facility: HOSPITAL | Age: 59
Discharge: HOME | End: 2019-05-09
Attending: SURGERY | Admitting: SURGERY
Payer: COMMERCIAL

## 2019-05-09 VITALS
TEMPERATURE: 97 F | DIASTOLIC BLOOD PRESSURE: 75 MMHG | SYSTOLIC BLOOD PRESSURE: 143 MMHG | HEIGHT: 66 IN | BODY MASS INDEX: 18.32 KG/M2 | HEART RATE: 88 BPM | WEIGHT: 114 LBS | RESPIRATION RATE: 17 BRPM

## 2019-05-09 DIAGNOSIS — Z99.2 ESRD ON HEMODIALYSIS: Primary | Chronic | ICD-10-CM

## 2019-05-09 DIAGNOSIS — N18.6 ESRD ON HEMODIALYSIS: Primary | Chronic | ICD-10-CM

## 2019-05-09 DIAGNOSIS — I77.0 AV (ARTERIOVENOUS FISTULA): ICD-10-CM

## 2019-05-09 PROCEDURE — 99152 MOD SED SAME PHYS/QHP 5/>YRS: CPT | Mod: ,,, | Performed by: SURGERY

## 2019-05-09 PROCEDURE — 25000003 PHARM REV CODE 250: Performed by: SURGERY

## 2019-05-09 PROCEDURE — C1887 CATHETER, GUIDING: HCPCS | Performed by: SURGERY

## 2019-05-09 PROCEDURE — 36902 INTRO CATH DIALYSIS CIRCUIT: CPT | Mod: ,,, | Performed by: SURGERY

## 2019-05-09 PROCEDURE — 99152 PR MOD CONSCIOUS SEDATION, SAME PHYS, 5+ YRS, FIRST 15 MIN: ICD-10-PCS | Mod: ,,, | Performed by: SURGERY

## 2019-05-09 PROCEDURE — 99152 MOD SED SAME PHYS/QHP 5/>YRS: CPT | Performed by: SURGERY

## 2019-05-09 PROCEDURE — C1894 INTRO/SHEATH, NON-LASER: HCPCS | Performed by: SURGERY

## 2019-05-09 PROCEDURE — 27201423 OPTIME MED/SURG SUP & DEVICES STERILE SUPPLY: Performed by: SURGERY

## 2019-05-09 PROCEDURE — C1725 CATH, TRANSLUMIN NON-LASER: HCPCS | Performed by: SURGERY

## 2019-05-09 PROCEDURE — 36902 PR INTRO CATH, DIALYSIS CIRCUIT W/TRANSLML BALLOON ANGIO: ICD-10-PCS | Mod: ,,, | Performed by: SURGERY

## 2019-05-09 PROCEDURE — 63600175 PHARM REV CODE 636 W HCPCS: Performed by: SURGERY

## 2019-05-09 PROCEDURE — C1769 GUIDE WIRE: HCPCS | Performed by: SURGERY

## 2019-05-09 PROCEDURE — 99153 MOD SED SAME PHYS/QHP EA: CPT | Performed by: SURGERY

## 2019-05-09 PROCEDURE — 36902 INTRO CATH DIALYSIS CIRCUIT: CPT | Performed by: SURGERY

## 2019-05-09 RX ORDER — FENTANYL CITRATE 50 UG/ML
INJECTION, SOLUTION INTRAMUSCULAR; INTRAVENOUS
Status: DISCONTINUED | OUTPATIENT
Start: 2019-05-09 | End: 2019-05-09 | Stop reason: HOSPADM

## 2019-05-09 RX ORDER — LIDOCAINE HYDROCHLORIDE 10 MG/ML
1 INJECTION, SOLUTION EPIDURAL; INFILTRATION; INTRACAUDAL; PERINEURAL ONCE
Status: DISCONTINUED | OUTPATIENT
Start: 2019-05-09 | End: 2019-05-09 | Stop reason: HOSPADM

## 2019-05-09 RX ORDER — HYDROCODONE BITARTRATE AND ACETAMINOPHEN 5; 325 MG/1; MG/1
1 TABLET ORAL EVERY 4 HOURS PRN
Status: DISCONTINUED | OUTPATIENT
Start: 2019-05-09 | End: 2019-05-09 | Stop reason: HOSPADM

## 2019-05-09 RX ORDER — HYDRALAZINE HYDROCHLORIDE 20 MG/ML
INJECTION INTRAMUSCULAR; INTRAVENOUS
Status: DISCONTINUED | OUTPATIENT
Start: 2019-05-09 | End: 2019-05-09 | Stop reason: HOSPADM

## 2019-05-09 RX ORDER — LIDOCAINE HYDROCHLORIDE 20 MG/ML
INJECTION, SOLUTION INFILTRATION; PERINEURAL
Status: DISCONTINUED | OUTPATIENT
Start: 2019-05-09 | End: 2019-05-09 | Stop reason: HOSPADM

## 2019-05-09 RX ORDER — MIDAZOLAM HYDROCHLORIDE 1 MG/ML
INJECTION, SOLUTION INTRAMUSCULAR; INTRAVENOUS
Status: DISCONTINUED | OUTPATIENT
Start: 2019-05-09 | End: 2019-05-09 | Stop reason: HOSPADM

## 2019-05-09 RX ORDER — SODIUM CHLORIDE 0.9 % (FLUSH) 0.9 %
10 SYRINGE (ML) INJECTION
Status: DISCONTINUED | OUTPATIENT
Start: 2019-05-09 | End: 2019-05-09 | Stop reason: HOSPADM

## 2019-05-09 RX ORDER — MUPIROCIN 20 MG/G
OINTMENT TOPICAL
Status: DISCONTINUED | OUTPATIENT
Start: 2019-05-09 | End: 2019-05-09 | Stop reason: HOSPADM

## 2019-05-09 NOTE — DISCHARGE INSTRUCTIONS
Cath discharge instructions:  1. Do not strain or lift anything greater than 4 lbs to wrist that was accessed.  2. Do not drive or operate any dangerous machinery for 24 hours.  3. Keep the dressing on, clean, and dry for 24 hours.  4. After 24 hours, the dressing may be removed and a shower is allowed.  5. Clean the area with mild soap and water and then cover it with a bandage.  6. Once the skin has healed, bathing in a tub or swimming is allowed.  7. Inspect the access site daily and report to the physician any swelling at the site that  cannot be controlled with manual pressure for 10 minutes, unusual pain at the  access site or affected extremity, unusual swelling at the access site, or signs or  symptoms of infection such as redness, pain, or fever.  Call 911 if you have:  Bleeding from the puncture site that you cannot stop by doing the following:  Keep your wrist straight and apply firm pressure to the site using your fingers and a gauze pad. Keep the pressure on for 20 minutes. Continue this until the bleeding stops. This may take awhile. When bleeding stops, cover the site with a sterile bandage and keep your wrist still as much as possible.

## 2019-05-09 NOTE — BRIEF OP NOTE
Brief Operative Note  Date: 05/09/2019    Surgeon(s) and Role:     * Chucho Miller MD - Primary     * Megha Machuca MD - Resident - Assisting    Pre-op Diagnosis:  AV (arteriovenous fistula) stenosis    Post-op Diagnosis:  Same + AVF pseuoaneurysm    Procedure(s):  1) US guided acces left radial artery  2) PTA LUE radiocephalic AVF (8 x 40 dorado)  3) PTA LUE radiocephalic AVF (6x 40 ultraverse)  4) conscious sedation 75 min      Surgeon: Chucho Miller MD  Vascular & Endovascular Surgery     Anesthesia: General/MAC    Findings/Key Components:  Successful treatment of mulitfocal AVF stenoses.  Moderate psa noted from prior HD trauma.  Excellent flow noted at completion.     EBL: Minimal         Specimens (From admission, onward)    None          I attest to being present for the procedure and performing the case.  Chucho Miller MD  Vascular & Endovascular Surgery     Discharge Note  SUMMARY    Admit Date: 5/9/2019    Attending Physician: Chucho Miller MD  Vascular & Endovascular Surgery       Discharge Physician: Chucho Miller MD  Vascular & Endovascular Surgery       Discharge Date: 05/09/2019    Final Diagnosis: AV (arteriovenous fistula) [I77.0]    Disposition: Home or self-care    Patient Instructions:   Discharge Medication List as of 5/9/2019  1:45 PM      CONTINUE these medications which have NOT CHANGED    Details   atorvastatin (LIPITOR) 20 MG tablet TAKE 1 TABLET(20 MG) BY MOUTH EVERY DAY, Normal      calcium acetate (PHOSLO) 667 mg capsule Starting Fri 1/25/2019, Historical Med      cyanocobalamin (VITAMIN B-12) 1000 MCG tablet Take 100 mcg by mouth once daily., Historical Med      DIALYVITE 100-1 mg Tab Take 1 tablet by mouth once daily., Starting Mon 4/1/2019, Historical Med      olmesartan (BENICAR) 20 MG tablet Take 1 tablet (20 mg total) by mouth once daily., Starting Mon 11/26/2018, Until Thu 5/9/2019, Normal      VITAMIN D2 50,000 unit capsule every 7 days. , Starting  Thu 3/14/2019, Historical Med             Diet:  Resume pre-operative diet    Activity:  Ad latonya    Follow-up:  Follow-up in clinic with Dr Miller within 1-2 weeks; please call clinic nurse at

## 2019-05-09 NOTE — DISCHARGE SUMMARY
[facility]  Discharge Note  Short Stay    Admit Date: 5/9/2019    Discharge Date and Time: 05/09/2019 4:30 PM      Attending Physician: No att. providers found     Discharge Provider: Megha Machuca    Diagnoses:  Active Hospital Problems    Diagnosis  POA    *AV (arteriovenous fistula) [I77.0]  Yes      Resolved Hospital Problems   No resolved problems to display.       Discharged Condition: good    Hospital Course: Patient was admitted for fistulagram and tolerated the procedure well with no complications. The patient was discharged home in good condition on the same day.       Final Diagnoses: Same as principal problem.    Disposition: Home or Self Care    Follow up/Patient Instructions:    Medications:  Reconciled Home Medications:   Discharge Medication List as of 5/9/2019  1:45 PM      CONTINUE these medications which have NOT CHANGED    Details   atorvastatin (LIPITOR) 20 MG tablet TAKE 1 TABLET(20 MG) BY MOUTH EVERY DAY, Normal      calcium acetate (PHOSLO) 667 mg capsule Starting Fri 1/25/2019, Historical Med      cyanocobalamin (VITAMIN B-12) 1000 MCG tablet Take 100 mcg by mouth once daily., Historical Med      DIALYVITE 100-1 mg Tab Take 1 tablet by mouth once daily., Starting Mon 4/1/2019, Historical Med      olmesartan (BENICAR) 20 MG tablet Take 1 tablet (20 mg total) by mouth once daily., Starting Mon 11/26/2018, Until Thu 5/9/2019, Normal      VITAMIN D2 50,000 unit capsule every 7 days. , Starting Thu 3/14/2019, Historical Med           Discharge Procedure Orders   VAS  Hemodialysis Access   Standing Status: Future Standing Exp. Date: 05/09/20     Diet general     Call MD for:  temperature >100.4     Call MD for:  persistent nausea and vomiting     Call MD for:  severe uncontrolled pain     Call MD for:  difficulty breathing, headache or visual disturbances     No dressing needed     Activity as tolerated     Follow-up Information     Chucho Miller MD In 3 weeks.     Specialty:  Vascular Surgery  Why:  post op, US before   Contact information:  [address]  [facility] [address]  [phone]                   Discharge Procedure Orders (must include Diet, Follow-up, Activity):   Discharge Procedure Orders (must include Diet, Follow-up, Activity)   VAS US Hemodialysis Access   Standing Status: Future Standing Exp. Date: 05/09/20     Diet general     Call MD for:  temperature >100.4     Call MD for:  persistent nausea and vomiting     Call MD for:  severe uncontrolled pain     Call MD for:  difficulty breathing, headache or visual disturbances     No dressing needed     Activity as tolerated

## 2019-05-09 NOTE — NURSING
Received via stretcher from procedure.  Report from BRITTNEY Mcgill.  Ambulated from stretcher to chair without difficulty.  No c/o pain or SOB.  TR band in place to left wrist. No bleeding or hematoma noted.  VSS.  Tolerating fluids and sandwich on arrival.  Will continue to monitor.

## 2019-05-09 NOTE — PLAN OF CARE
Patient discharged per MD orders. Instructions given on medications, wound care, activity, signs of infection, when to call MD, and follow up appointments. Pt verbalized understanding. Telemetry and PIV removed. Patient taken by wc to daughter private vehicle.

## 2019-05-09 NOTE — INTERVAL H&P NOTE
The patient has been examined and the H&P has been reviewed:    I concur with the findings and no changes have occurred since H&P was written.    Anesthesia/Surgery risks, benefits and alternative options discussed and understood by patient/family.          Active Hospital Problems    Diagnosis  POA    AV (arteriovenous fistula) [I77.0]  Yes      Resolved Hospital Problems   No resolved problems to display.

## 2019-05-13 NOTE — OP NOTE
DATE OF PROCEDURE:  05/09/2019    SURGEON:  Chucho Miller M.D.    PREOPERATIVE DIAGNOSIS:  Arteriovenous fistula stenosis, left upper extremity.    POSTOPERATIVE DIAGNOSES:  Arteriovenous fistula stenosis, left upper extremity   plus left aVF pseudoaneurysm.    PROCEDURES PERFORMED:  1.  Ultrasound-guided access to the left radial artery.  2.  PTA of left upper extremity radiocephalic AV fistula with 8 x 40 Lynn   balloon.  3.  PTA of left upper extremity radiocephalic AV fistula with 6 x 40 Ultraverse   balloon.  4.  Conscious sedation time 75 minutes.    ANESTHESIA:  Local plus MAC.    ESTIMATED BLOOD LOSS:  Minimal.    COMPLICATIONS:  None.    DESCRIPTION OF PROCEDURE:  The patient is a 59-year-old male well known to me.    I created a left upper extremity radiocephalic fistula in the past.  The patient   presented to decrease flow volume and evidence of focal hemodynamically   significant stenosis and therefore an intervention was planned.    The patient was identified as Duran Mccauley and was brought to the   Catheterization Laboratory.  He was placed supine on the table and conscious   sedation was begun.  The left upper extremity was prepped and draped in standard   sterile fashion.  Ultrasound guidance was used to identify a patent left radial   artery and a permanent image was placed in the patient's chart.  A subcutaneous   wheal of 1% lidocaine was raised over the radial artery, which was accessed   under ultrasound guidance with a micropuncture needle.  The micropuncture wire   was advanced under fluoroscopic guidance into the fistula.  Micropuncture sheath   was placed and a fistulogram demonstrated femoral focal stenosis in the distal   outflow of the fistula.  A 0.035 J-wire was then placed through the   micropuncture sheath, which was withdrawn and replaced with a 6-Romanian slender   transradial sheath.    A stiff angled Glidewire was maneuvered under fluoroscopic guidance into the   fistula  and into the outflow vein in the upper arm.  A roadmap angiogram was   used to identify a focal stenosis in the outflow fistula, which was treated with   recurrent inflations of an 8 x 40 [place] balloon.  A residual under 20%   stenosis was left.  Of note, there was a pseudoaneurysm in the mid fistula from   a previous traumatic hemodialysis access attempt for which I had seen the   patient previously.    An inflow stenosis of the fistula was identified on further angiogram and   treated with a 6 x 40 Ultraverse balloon with residual stenosis under 20%.    At this point, there was brisk flow and a good thrill through the fistula and   all wires and catheters were removed.  A transradial band was placed over the   radial artery access points and hemostasis was ensured.    I, Chucho Miller, was present for the entirety of the operation and   supervised conscious sedation for a period of 75 minutes.  For exact dosages of   medications, please see the nursing record.      SID/TATO  dd: 05/12/2019 18:57:22 (CDT)  td: 05/12/2019 22:38:35 (CDT)  Doc ID   #[id number]  Job ID #[id number]    CC:

## 2019-05-30 ENCOUNTER — HOSPITAL ENCOUNTER (OUTPATIENT)
Dept: VASCULAR SURGERY | Facility: CLINIC | Age: 59
Discharge: HOME | End: 2019-05-30
Attending: STUDENT IN AN ORGANIZED HEALTH CARE EDUCATION/TRAINING PROGRAM
Payer: COMMERCIAL

## 2019-05-30 ENCOUNTER — OFFICE VISIT (OUTPATIENT)
Dept: VASCULAR SURGERY | Facility: CLINIC | Age: 59
End: 2019-05-30
Attending: SURGERY
Payer: COMMERCIAL

## 2019-05-30 VITALS
TEMPERATURE: 98 F | HEART RATE: 77 BPM | SYSTOLIC BLOOD PRESSURE: 141 MMHG | DIASTOLIC BLOOD PRESSURE: 72 MMHG | WEIGHT: 116.19 LBS | RESPIRATION RATE: 18 BRPM | BODY MASS INDEX: 18.67 KG/M2 | HEIGHT: 66 IN

## 2019-05-30 DIAGNOSIS — Z99.2 ESRD ON HEMODIALYSIS: Chronic | ICD-10-CM

## 2019-05-30 DIAGNOSIS — I77.0 AV (ARTERIOVENOUS FISTULA): ICD-10-CM

## 2019-05-30 DIAGNOSIS — T82.858D AV FISTULA STENOSIS, SUBSEQUENT ENCOUNTER: Primary | ICD-10-CM

## 2019-05-30 DIAGNOSIS — N18.6 ESRD ON HEMODIALYSIS: Chronic | ICD-10-CM

## 2019-05-30 PROCEDURE — 93990 PR DUPLEX HEMODIALYSIS ACCESS: ICD-10-PCS | Mod: 26,S$PBB,, | Performed by: SURGERY

## 2019-05-30 PROCEDURE — 93990 DOPPLER FLOW TESTING: CPT | Mod: 26,S$PBB,, | Performed by: SURGERY

## 2019-05-30 PROCEDURE — 99214 OFFICE O/P EST MOD 30 MIN: CPT | Mod: S$PBB,,, | Performed by: SURGERY

## 2019-05-30 PROCEDURE — 99999 PR PBB SHADOW E&M-EST. PATIENT-LVL III: CPT | Mod: PBBFAC,,, | Performed by: SURGERY

## 2019-05-30 PROCEDURE — 93990 DOPPLER FLOW TESTING: CPT | Mod: PBBFAC | Performed by: SURGERY

## 2019-05-30 PROCEDURE — 99999 PR PBB SHADOW E&M-EST. PATIENT-LVL III: ICD-10-PCS | Mod: PBBFAC,,, | Performed by: SURGERY

## 2019-05-30 PROCEDURE — 99214 PR OFFICE/OUTPT VISIT, EST, LEVL IV, 30-39 MIN: ICD-10-PCS | Mod: S$PBB,,, | Performed by: SURGERY

## 2019-05-30 NOTE — PROGRESS NOTES
Duran Garrick  05/30/2019    HPI:  Patient is a 59 y.o. male s/p left radiocephalic AVF creation in 2/2019.  Doing quite well.  Has been using for dialysis without issues, permacath removed earlier this month. He had recent fistulogram and is now presenting for interval follow up.    Not currently on blood thinners or antiplatelets.   No MI/stroke  Tobacco use: None  Occasional etoh.     Past Medical History:   Diagnosis Date    Dialysis patient     MWF    Eczema     Hepatitis B     treated 2010s and negative RNA load afterward    Hyperlipidemia     Hypertension      Past Surgical History:   Procedure Laterality Date    COLONOSCOPY N/A 9/14/2018    [facility]O    CREATION, AV FISTULA, LUE brachiobasilic Left 2/7/2019    Performed by [facility]ucho Miller MD at [facility] OR [facility]    Fistulogram Left 5/9/2019    Performed by Chucho Miller MD at [facility] CATH LAB    RENAL BIOPSY      TUNNELED VENOUS CATHETER PLACEMENT      HD cath     Family History   Problem Relation Age of Onset    Heart disease Father     Diabetes Brother      Social History     Socioeconomic History    Marital status:      Spouse name: Not on file    Number of children: Not on file    Years of education: Not on file    Highest education level: Not on file   Occupational History    Not on file   Social Needs    Financial resource strain: Not on file    Food insecurity:     Worry: Not on file     Inability: Not on file    Transportation needs:     Medical: Not on file     Non-medical: Not on file   Tobacco Use    Smoking status: Never Smoker    Smokeless tobacco: Never Used   Substance and Sexual Activity    Alcohol use: Yes     Alcohol/week: 0.6 oz     Types: 1 Cans of beer per week    Drug use: No    Sexual activity: Yes     Partners: Female   Lifestyle    Physical activity:     Days per week: Not on file     Minutes per session: Not on file    Stress: Not on file   Relationships     Social connections:     Talks on phone: Not on file     Gets together: Not on file     Attends Hoahaoism service: Not on file     Active member of club or organization: Not on file     Attends meetings of clubs or organizations: Not on file     Relationship status: Not on file   Other Topics Concern    Not on file   Social History Narrative    Not on file     Current Outpatient Medications on File Prior to Visit   Medication Sig    atorvastatin (LIPITOR) 20 MG tablet TAKE 1 TABLET(20 MG) BY MOUTH EVERY DAY    calcium acetate (PHOSLO) 667 mg capsule     cyanocobalamin (VITAMIN B-12) 1000 MCG tablet Take 100 mcg by mouth once daily.    olmesartan (BENICAR) 20 MG tablet Take 1 tablet (20 mg total) by mouth once daily.    DIALYVITE 100-1 mg Tab Take 1 tablet by mouth once daily.    VITAMIN D2 50,000 unit capsule every 7 days.      No current facility-administered medications on file prior to visit.        REVIEW OF SYSTEMS:  General: negative; ENT: negative; Allergy and Immunology: negative; Hematological and Lymphatic: Negative; Endocrine: negative; Respiratory: no cough, shortness of breath, or wheezing; Cardiovascular: no chest pain or dyspnea on exertion; Gastrointestinal: no abdominal pain/back, change in bowel habits, or bloody stools; Genito-Urinary: no dysuria, trouble voiding, or hematuria; Musculoskeletal: negative  Neurological: no TIA or stroke symptoms    PHYSICAL EXAM:      Pulse: 77  Temp: 98.1 °F (36.7 °C)      General appearance:  Alert, well appearing, and in no distress   Neurological: Normal speech, no focal findings noted; CN II - XII grossly intact                Mental status:  Oriented to person, place, and time                 Neck: Supple, no significant adenopathy; thyroid is not enlarged                  No carotid bruits can be auscultated                Chest:  Clear to auscultation, no wheezes, rales or rhonchi, symmetric air entry     No use of accessory muscles              Cardiac: Normal rate and regular rhythm, S1 and S2 normal; PMI non-displaced          Abdomen: Soft, nontender, nondistended, no masses or organomegaly     No rebound tenderness noted; bowel sounds normal           Extremities:   2+ brachial and radial pulses bilaterally     No pedal edema     No ulcerations     L forearm - strong thrill throughout AVF     Lab Results:  Lab Results   Component Value Date    K 4.1 02/07/2019    K 6.0 (H) 01/24/2019    K 4.9 11/25/2018    CREATININE 8.7 (H) 02/07/2019    CREATININE 10.4 (H) 01/24/2019    CREATININE 11.1 (H) 11/25/2018     Lab Results   Component Value Date    WBC 5.52 01/24/2019    WBC 18.00 (H) 11/25/2018    WBC 11.63 11/01/2018    HCT 47.8 01/24/2019    HCT 26.6 (L) 11/25/2018    HCT 21.2 (L) 11/01/2018     01/24/2019     11/25/2018     (L) 11/01/2018     Lab Results   Component Value Date    HGBA1C 4.5 09/13/2018    HGBA1C 4.6 12/04/2015     IMAGING:  EXAM NOHELIA: 05/30/2019 09:47  REF PHYS: IONA DAWSON      Indication:  PVD.  Results:  Dialysis Access: Left Arm Radiocephalic fistula                    PSV [cm/s]        Flow Volume [ml/min]  ________________________________________________________________  Proximal to Anastomosis (artery)    228               -  Anastomosis       565               -  Proximal AVF      591               -  Middle AVF        237               1575  Distal AVF        183               -    ACF- 233 cm/s.    Report Summary:  Impression:   Color flow duplex exam reveals a patent left radiocephalic AV fistula. There are elevated PSV's in the proximal AVF measuring 591 cm/s with a focal narrowing noted which is suggestive of a hemodynamically significant stenosis. Volume flow at mid bicep   level measures 1575 ml./min.    IMP/PLAN:  59 y.o. male with a history of renal failure requiring dialysis. He underwent left radiocephalic AVF creation at the wrist in 2/2019 and the AVF has matured well.  Proximal stenosis  treated effectively during recent fistulagram. Excellent thrill.     --Vascular ultrasound with appropriate velocities  --Return to clinic in 3 months for survaillence ultrasound      Chucho Miller MD  Vascular & Endovascular Surgery

## 2019-06-25 RX ORDER — OLMESARTAN MEDOXOMIL 20 MG/1
20 TABLET ORAL DAILY
Qty: 90 TABLET | Refills: 0 | Status: SHIPPED | OUTPATIENT
Start: 2019-06-25 | End: 2020-05-18 | Stop reason: SDUPTHER

## 2019-06-25 NOTE — TELEPHONE ENCOUNTER
----- Message from Natalie Stanton sent at 6/25/2019 10:24 AM CDT -----  Contact: Joey Cody  Type: RX Refill Request    Who Called: Joey Cody    Refill or New Rx: Refill    RX Name and Strength: olmesartan (BENICAR) 20 MG tablet     How is the patient currently taking it? (ex. 1XDay):    Is this a 30 day or 90 day RX: 90    Preferred Pharmacy with phone number: [facility]   [address] [place] [address]    Local or Mail Order: Mail    Ordering Provider: Dr. Contreras    Would the patient rather a call back or a response [facility]? Call back    Best Call Back Number: [phone]    Additional Information: Fax [phone]

## 2019-10-14 RX ORDER — OLMESARTAN MEDOXOMIL 20 MG/1
TABLET ORAL
Qty: 90 TABLET | Refills: 0 | OUTPATIENT
Start: 2019-10-14

## 2020-01-07 DIAGNOSIS — E78.00 PURE HYPERCHOLESTEROLEMIA: ICD-10-CM

## 2020-01-07 DIAGNOSIS — Z12.5 SCREENING PSA (PROSTATE SPECIFIC ANTIGEN): Primary | ICD-10-CM

## 2020-01-07 DIAGNOSIS — M1A.09X0 IDIOPATHIC CHRONIC GOUT OF MULTIPLE SITES WITHOUT TOPHUS: ICD-10-CM

## 2020-01-07 DIAGNOSIS — I10 ESSENTIAL (PRIMARY) HYPERTENSION: Chronic | ICD-10-CM

## 2020-01-07 RX ORDER — ATORVASTATIN CALCIUM 20 MG/1
TABLET, FILM COATED ORAL
Qty: 90 TABLET | Refills: 0 | Status: SHIPPED | OUTPATIENT
Start: 2020-01-07 | End: 2020-04-06

## 2020-01-07 NOTE — TELEPHONE ENCOUNTER
Refill approved.  Due for f/u chronic conditions/physical with labs prior.  Please sched.    Thank you,  Michael

## 2020-04-06 DIAGNOSIS — E78.00 PURE HYPERCHOLESTEROLEMIA: ICD-10-CM

## 2020-04-06 RX ORDER — ATORVASTATIN CALCIUM 20 MG/1
TABLET, FILM COATED ORAL
Qty: 90 TABLET | Refills: 1 | Status: SHIPPED | OUTPATIENT
Start: 2020-04-06 | End: 2020-10-15 | Stop reason: SDUPTHER

## 2020-05-18 ENCOUNTER — PATIENT MESSAGE (OUTPATIENT)
Dept: FAMILY MEDICINE | Facility: CLINIC | Age: 60
End: 2020-05-18

## 2020-05-18 DIAGNOSIS — I10 ESSENTIAL (PRIMARY) HYPERTENSION: Primary | Chronic | ICD-10-CM

## 2020-05-18 RX ORDER — OLMESARTAN MEDOXOMIL 20 MG/1
20 TABLET ORAL DAILY
Qty: 90 TABLET | Refills: 0 | Status: SHIPPED | OUTPATIENT
Start: 2020-05-18 | End: 2020-05-19

## 2020-05-18 NOTE — TELEPHONE ENCOUNTER
No new care gaps identified.  Powered by [employer].. Reference number: [id number]. 5/18/2020 11:24:11 AM   OMIT

## 2020-05-19 ENCOUNTER — PATIENT MESSAGE (OUTPATIENT)
Dept: FAMILY MEDICINE | Facility: CLINIC | Age: 60
End: 2020-05-19

## 2020-05-19 RX ORDER — TELMISARTAN 20 MG/1
TABLET ORAL
Qty: 90 TABLET | Refills: 0 | Status: SHIPPED | OUTPATIENT
Start: 2020-05-19 | End: 2020-08-12

## 2020-05-19 NOTE — TELEPHONE ENCOUNTER
Care Due:                  Date            Visit Type   Department     Provider  --------------------------------------------------------------------------------    Last Visit: None Found      None         None Found  Next Visit: None Scheduled  None         None Found                                                            Last  Test          Frequency    Reason                     Performed    Due Date  --------------------------------------------------------------------------------    Office Visit  12 months..  olmesartan...............  Not Found    Overdue    Cr..........  12 months..  olmesartan...............  02- 02-    eGFR........  12 months..  olmesartan...............  Not Found    Overdue    K...........  12 months..  olmesartan...............  02- 02-    Powered by [employer]. Reference number: [id number]. 5/19/2020 11:13:18 AM CDT

## 2020-07-14 ENCOUNTER — LAB VISIT (OUTPATIENT)
Dept: LAB | Facility: HOSPITAL | Age: 60
End: 2020-07-14
Attending: INTERNAL MEDICINE
Payer: OTHER GOVERNMENT

## 2020-07-14 DIAGNOSIS — Z12.5 SCREENING PSA (PROSTATE SPECIFIC ANTIGEN): ICD-10-CM

## 2020-07-14 DIAGNOSIS — M1A.09X0 IDIOPATHIC CHRONIC GOUT OF MULTIPLE SITES WITHOUT TOPHUS: ICD-10-CM

## 2020-07-14 DIAGNOSIS — I10 ESSENTIAL (PRIMARY) HYPERTENSION: Chronic | ICD-10-CM

## 2020-07-14 LAB
ALBUMIN SERPL BCP-MCNC: 4.4 G/DL (ref 3.5–5.2)
ALP SERPL-CCNC: 80 U/L (ref 55–135)
ALT SERPL W/O P-5'-P-CCNC: 12 U/L (ref 10–44)
ANION GAP SERPL CALC-SCNC: 13 MMOL/L (ref 8–16)
AST SERPL-CCNC: 18 U/L (ref 10–40)
BASOPHILS # BLD AUTO: 0.02 K/UL (ref 0–0.2)
BASOPHILS NFR BLD: 0.4 % (ref 0–1.9)
BILIRUB SERPL-MCNC: 0.7 MG/DL (ref 0.1–1)
BUN SERPL-MCNC: 29 MG/DL (ref 8–23)
CALCIUM SERPL-MCNC: 9.3 MG/DL (ref 8.7–10.5)
CHLORIDE SERPL-SCNC: 100 MMOL/L (ref 95–110)
CHOLEST SERPL-MCNC: 150 MG/DL (ref 120–199)
CHOLEST/HDLC SERPL: 2.1 {RATIO} (ref 2–5)
CO2 SERPL-SCNC: 29 MMOL/L (ref 23–29)
COMPLEXED PSA SERPL-MCNC: 0.73 NG/ML (ref 0–4)
CREAT SERPL-MCNC: 6.8 MG/DL (ref 0.5–1.4)
DIFFERENTIAL METHOD: ABNORMAL
EOSINOPHIL # BLD AUTO: 0.2 K/UL (ref 0–0.5)
EOSINOPHIL NFR BLD: 3.4 % (ref 0–8)
ERYTHROCYTE [DISTWIDTH] IN BLOOD BY AUTOMATED COUNT: 15.5 % (ref 11.5–14.5)
EST. GFR  (AFRICAN AMERICAN): 9.2 ML/MIN/1.73 M^2
EST. GFR  (NON AFRICAN AMERICAN): 8 ML/MIN/1.73 M^2
GLUCOSE SERPL-MCNC: 70 MG/DL (ref 70–110)
HCT VFR BLD AUTO: 38.4 % (ref 40–54)
HDLC SERPL-MCNC: 71 MG/DL (ref 40–75)
HDLC SERPL: 47.3 % (ref 20–50)
HGB BLD-MCNC: 12.2 G/DL (ref 14–18)
IMM GRANULOCYTES # BLD AUTO: 0.01 K/UL (ref 0–0.04)
IMM GRANULOCYTES NFR BLD AUTO: 0.2 % (ref 0–0.5)
LDLC SERPL CALC-MCNC: 63 MG/DL (ref 63–159)
LYMPHOCYTES # BLD AUTO: 1.8 K/UL (ref 1–4.8)
LYMPHOCYTES NFR BLD: 34 % (ref 18–48)
MCH RBC QN AUTO: 29 PG (ref 27–31)
MCHC RBC AUTO-ENTMCNC: 31.8 G/DL (ref 32–36)
MCV RBC AUTO: 91 FL (ref 82–98)
MONOCYTES # BLD AUTO: 0.4 K/UL (ref 0.3–1)
MONOCYTES NFR BLD: 7.8 % (ref 4–15)
NEUTROPHILS # BLD AUTO: 2.9 K/UL (ref 1.8–7.7)
NEUTROPHILS NFR BLD: 54.2 % (ref 38–73)
NONHDLC SERPL-MCNC: 79 MG/DL
NRBC BLD-RTO: 0 /100 WBC
PLATELET # BLD AUTO: 148 K/UL (ref 150–350)
PMV BLD AUTO: 11.1 FL (ref 9.2–12.9)
POTASSIUM SERPL-SCNC: 4.5 MMOL/L (ref 3.5–5.1)
PROT SERPL-MCNC: 7.8 G/DL (ref 6–8.4)
RBC # BLD AUTO: 4.2 M/UL (ref 4.6–6.2)
SODIUM SERPL-SCNC: 142 MMOL/L (ref 136–145)
TRIGL SERPL-MCNC: 80 MG/DL (ref 30–150)
URATE SERPL-MCNC: 5.4 MG/DL (ref 3.4–7)
WBC # BLD AUTO: 5.27 K/UL (ref 3.9–12.7)

## 2020-07-14 PROCEDURE — 85025 COMPLETE CBC W/AUTO DIFF WBC: CPT

## 2020-07-14 PROCEDURE — 80053 COMPREHEN METABOLIC PANEL: CPT

## 2020-07-14 PROCEDURE — 84153 ASSAY OF PSA TOTAL: CPT

## 2020-07-14 PROCEDURE — 80061 LIPID PANEL: CPT

## 2020-07-14 PROCEDURE — 84550 ASSAY OF BLOOD/URIC ACID: CPT

## 2020-07-14 PROCEDURE — 36415 COLL VENOUS BLD VENIPUNCTURE: CPT | Mod: PO

## 2020-07-21 ENCOUNTER — OFFICE VISIT (OUTPATIENT)
Dept: FAMILY MEDICINE | Facility: CLINIC | Age: 60
End: 2020-07-21
Payer: MEDICARE

## 2020-07-21 VITALS
WEIGHT: 117 LBS | HEIGHT: 66 IN | BODY MASS INDEX: 18.8 KG/M2 | OXYGEN SATURATION: 99 % | DIASTOLIC BLOOD PRESSURE: 79 MMHG | SYSTOLIC BLOOD PRESSURE: 118 MMHG | HEART RATE: 84 BPM | TEMPERATURE: 98 F

## 2020-07-21 DIAGNOSIS — D69.6 THROMBOCYTOPENIA: Chronic | ICD-10-CM

## 2020-07-21 DIAGNOSIS — Z00.00 ROUTINE MEDICAL EXAM: Primary | ICD-10-CM

## 2020-07-21 DIAGNOSIS — N18.6 ANEMIA DUE TO CHRONIC KIDNEY DISEASE, ON CHRONIC DIALYSIS: Chronic | ICD-10-CM

## 2020-07-21 DIAGNOSIS — I10 ESSENTIAL (PRIMARY) HYPERTENSION: Chronic | ICD-10-CM

## 2020-07-21 DIAGNOSIS — Z99.2 ANEMIA DUE TO CHRONIC KIDNEY DISEASE, ON CHRONIC DIALYSIS: Chronic | ICD-10-CM

## 2020-07-21 DIAGNOSIS — N18.6 ESRD ON HEMODIALYSIS: Chronic | ICD-10-CM

## 2020-07-21 DIAGNOSIS — E78.00 PURE HYPERCHOLESTEROLEMIA: Chronic | ICD-10-CM

## 2020-07-21 DIAGNOSIS — Z99.2 ESRD ON HEMODIALYSIS: Chronic | ICD-10-CM

## 2020-07-21 DIAGNOSIS — D63.1 ANEMIA DUE TO CHRONIC KIDNEY DISEASE, ON CHRONIC DIALYSIS: Chronic | ICD-10-CM

## 2020-07-21 PROBLEM — E44.0 MALNUTRITION OF MODERATE DEGREE: Status: RESOLVED | Noted: 2018-09-13 | Resolved: 2020-07-21

## 2020-07-21 PROCEDURE — 99213 OFFICE O/P EST LOW 20 MIN: CPT | Mod: PBBFAC,PO | Performed by: INTERNAL MEDICINE

## 2020-07-21 PROCEDURE — 99396 PR PREVENTIVE VISIT,EST,40-64: ICD-10-PCS | Mod: S$PBB,,, | Performed by: INTERNAL MEDICINE

## 2020-07-21 PROCEDURE — 99999 PR PBB SHADOW E&M-EST. PATIENT-LVL III: CPT | Mod: PBBFAC,,, | Performed by: INTERNAL MEDICINE

## 2020-07-21 PROCEDURE — 99999 PR PBB SHADOW E&M-EST. PATIENT-LVL III: ICD-10-PCS | Mod: PBBFAC,,, | Performed by: INTERNAL MEDICINE

## 2020-07-21 PROCEDURE — 99396 PREV VISIT EST AGE 40-64: CPT | Mod: S$PBB,,, | Performed by: INTERNAL MEDICINE

## 2020-07-21 RX ORDER — VIT B COMP NO.3/FOLIC/C/BIOTIN 1 MG-60 MG
TABLET ORAL
COMMUNITY
Start: 2020-07-06

## 2020-07-21 RX ORDER — SEVELAMER CARBONATE 800 MG/1
TABLET, FILM COATED ORAL
COMMUNITY
Start: 2020-07-02

## 2020-07-21 NOTE — PROGRESS NOTES
Assessment & Plan  Problem List Items Addressed This Visit        Cardiac/Vascular    Pure hypercholesterolemia (Chronic)    Current Assessment & Plan     The current medical regimen is effective;  continue present plan and medications.          Essential (primary) hypertension (Chronic)    Current Assessment & Plan     The current medical regimen is effective;  continue present plan and medications.          Relevant Orders    CBC auto differential    Comprehensive metabolic panel    Lipid Panel       Renal/    ESRD on hemodialysis (Chronic)    Overview     MWF.  On transplant list         Current Assessment & Plan     Continue ACE-I/ARB, sodium restriction, and avoidance of nephrotoxic agents.          Relevant Orders    Magnesium    Vitamin D    Phosphorus    PTH, intact    Protein / creatinine ratio, urine       Hematology    Thrombocytopenia (Chronic)    Overview     Stable, asymptomatic chronic condition.  Will continue to maximize risk factor reduction and adjust medication as needed.             Other    Anemia due to chronic kidney disease, on chronic dialysis (Chronic)    Current Assessment & Plan     Stable.  Monitor            Other Visit Diagnoses     Routine medical exam    -  Primary  -    Discussed healthy diet, regular exercise, necessary labs, age appropriate cancer screening, and routine vaccinations.               Health Maintenance reviewed, reports having received PVC at HD.  Check on Shingrtix .    Follow-up: Follow up in about 9 months (around 4/21/2021) for follow up for chronic conditions.    ______________________________________________________________________    Chief Complaint  Chief Complaint   Patient presents with    Annual Exam       HPI  Duran Mccauley is a 60 y.o. male with multiple medical diagnoses as listed in the medical history and problem list that presents for routine physical.  Pt is known to me with his last appointment 12/2018.  He had labs prior to this OV that  showed overall reassuring CBC, Stable CMP, controlled lipids, and normal PSA.     He reports he has been doing well with HD.  Still making urine. No acute complaints.        PAST MEDICAL HISTORY:  Past Medical History:   Diagnosis Date    Dialysis patient     MWF    Eczema     Hepatitis B     treated 2010s and negative RNA load afterward    Hyperlipidemia     Hypertension        PAST SURGICAL HISTORY:  Past Surgical History:   Procedure Laterality Date    COLONOSCOPY N/A 9/14/2018    Procedure: COLONOSCOPY;  Surgeon: Viri Blair MD;  Location: [facility] ENDO;  Service: Endoscopy;  Laterality: N/A;    FISTULOGRAM Left 5/9/2019    Procedure: Fistulogram;  Surgeon: Chucho Miller MD;  Location: [facility] CATH LAB;  Service: Cardiology;  Laterality: Left;  Date 5/9/19    RENAL BIOPSY      TUNNELED VENOUS CATHETER PLACEMENT      HD cath       SOCIAL HISTORY:  Social History     Socioeconomic History    Marital status:      Spouse name: Not on file    Number of children: Not on file    Years of education: Not on file    Highest education level: Not on file   Occupational History    Not on file   Social Needs    Financial resource strain: Not on file    Food insecurity     Worry: Not on file     Inability: Not on file    Transportation needs     Medical: Not on file     Non-medical: Not on file   Tobacco Use    Smoking status: Never Smoker    Smokeless tobacco: Never Used   Substance and Sexual Activity    Alcohol use: Yes     Alcohol/week: 1.0 standard drinks     Types: 1 Cans of beer per week    Drug use: No    Sexual activity: Yes     Partners: Female   Lifestyle    Physical activity     Days per week: Not on file     Minutes per session: Not on file    Stress: Not on file   Relationships    Social connections     Talks on phone: Not on file     Gets together: Not on file     Attends [employer] service: Not on file     Active member of club or organization: Not on file     Attends meetings  of clubs or organizations: Not on file     Relationship status: Not on file   Other Topics Concern    Not on file   Social History Narrative    Not on file       FAMILY HISTORY:  Family History   Problem Relation Age of Onset    Heart disease Father     Diabetes Brother        ALLERGIES AND MEDICATIONS: updated and reviewed.  Review of patient's allergies indicates:  No Known Allergies  Current Outpatient Medications   Medication Sig Dispense Refill    atorvastatin (LIPITOR) 20 MG tablet TAKE 1 TABLET(20 MG) BY MOUTH EVERY DAY 90 tablet 1    calcium acetate (PHOSLO) 667 mg capsule       cyanocobalamin (VITAMIN B-12) 1000 MCG tablet Take 100 mcg by mouth once daily.      telmisartan (MICARDIS) 20 MG Tab TAKE 1 TABLET BY MOUTH EVERY DAY 90 tablet 0    DIALYVITE 100-1 mg Tab Take 1 tablet by mouth once daily.  3    DESIREE-CARLITA RX 1- mg-mg-mcg Tab TK 1 T  PO ONCE D      sevelamer carbonate (RENVELA) 800 mg Tab       VITAMIN D2 50,000 unit capsule every 7 days.        No current facility-administered medications for this visit.          ROS  Review of Systems   Constitutional: Negative for chills, fever and unexpected weight change.   HENT: Negative for congestion, dental problem, ear pain, hearing loss, rhinorrhea, sore throat and trouble swallowing.    Eyes: Negative for discharge, redness and visual disturbance.   Respiratory: Negative for cough, chest tightness, shortness of breath and wheezing.    Cardiovascular: Negative for chest pain, palpitations and leg swelling.   Gastrointestinal: Negative for abdominal pain, constipation, diarrhea, nausea and vomiting.        Belching   Endocrine: Negative for polydipsia, polyphagia and polyuria.   Genitourinary: Negative for decreased urine volume, dysuria and hematuria.   Musculoskeletal: Negative for arthralgias and myalgias.   Skin: Negative for color change and rash.   Neurological: Negative for dizziness, weakness, light-headedness and headaches.  "  Psychiatric/Behavioral: Negative for decreased concentration, dysphoric mood, sleep disturbance and suicidal ideas.           Physical Exam  Vitals:    07/21/20 1057   BP: 118/79   BP Location: Left arm   Patient Position: Sitting   BP Method: Small (Automatic)   Pulse: 84   Temp: 97.5 °F (36.4 °C)   TempSrc: Other (see comments)   SpO2: 99%   Weight: 53.1 kg (117 lb)   Height: 5' 6" (1.676 m)    Body mass index is 18.88 kg/m².  Weight: 53.1 kg (117 lb)   Height: 5' 6" (167.6 cm)   Physical Exam  Constitutional:       General: He is not in acute distress.     Appearance: He is well-developed.   HENT:      Head: Normocephalic and atraumatic.   Eyes:      General: Lids are normal. No scleral icterus.     Conjunctiva/sclera: Conjunctivae normal.      Pupils: Pupils are equal, round, and reactive to light.   Neck:      Musculoskeletal: Full passive range of motion without pain and neck supple.      Thyroid: No thyromegaly.      Vascular: No carotid bruit or JVD.   Cardiovascular:      Rate and Rhythm: Normal rate and regular rhythm.      Heart sounds: Normal heart sounds. No murmur. No friction rub. No S3 or S4 sounds.       Comments: AV fistula noted in LUE  Pulmonary:      Effort: Pulmonary effort is normal.      Breath sounds: Normal breath sounds. No wheezing, rhonchi or rales.   Abdominal:      General: Bowel sounds are normal. There is no distension.      Palpations: Abdomen is soft.      Tenderness: There is no abdominal tenderness.   Musculoskeletal:         General: No tenderness.      Right lower leg: No edema.      Left lower leg: No edema.   Skin:     General: Skin is warm and dry.      Findings: No rash.   Neurological:      Mental Status: He is alert and oriented to person, place, and time.   Psychiatric:         Speech: Speech normal.         Behavior: Behavior normal.         Thought Content: Thought content normal.           Health Maintenance       Date Due Completion Date    HIV Screening 04/09/1974 " ---    Shingles Vaccine (1 of 2) 04/09/2009 ---    Influenza Vaccine (1) 09/01/2020 11/1/2018    Lipid Panel 07/14/2025 7/14/2020    TETANUS VACCINE 06/08/2026 6/8/2016    Colorectal Cancer Screening 09/14/2028 9/14/2018    Override on 4/9/2009: Done (reportedly normal)

## 2020-10-12 ENCOUNTER — PATIENT MESSAGE (OUTPATIENT)
Dept: FAMILY MEDICINE | Facility: CLINIC | Age: 60
End: 2020-10-12

## 2020-10-15 DIAGNOSIS — E78.00 PURE HYPERCHOLESTEROLEMIA: ICD-10-CM

## 2020-10-16 RX ORDER — ATORVASTATIN CALCIUM 20 MG/1
20 TABLET, FILM COATED ORAL DAILY
Qty: 90 TABLET | Refills: 3 | Status: SHIPPED | OUTPATIENT
Start: 2020-10-16

## 2021-06-02 NOTE — TELEPHONE ENCOUNTER
HPI:    Patient ID: Mackenzie Mitchell is a 67year old male. This 65-year-old male presents with pain associated with the nails of his right foot. The last time I saw this patient was April 7. He states he had relief by previous care.       ROS:     I did r Refill approved.  Due for routine physical with labs prior.  Please sched.    Thank you,  Michael    without incident I reduced nail, subungual debris, and some keratosis. No ulcerations or infections were noted upon debridement.   I anticipate relief will see patient for care if and when symptoms redevelop           ASSESSMENT/PLAN:   Pain of toe of righ

## 2021-06-30 NOTE — PROGRESS NOTES
"Awake alert oriented NAD    Denies CNS ENT CP GI  RHEUM OR DERM SX  Past Medical History:   Diagnosis Date    Eczema     Hepatitis B     treated 2010s and negative RNA load afterward    Hyperlipidemia     Hypertension      Review of patient's allergies indicates:  No Known Allergies    Current Facility-Administered Medications   Medication    0.9%  NaCl infusion (for blood administration)    0.9%  NaCl infusion (for blood administration)    amLODIPine tablet 10 mg    atorvastatin tablet 20 mg    calcitRIOL capsule 0.25 mcg    cloNIDine tablet 0.1 mg    heparin (porcine) injection 5,000 Units    predniSONE tablet 30 mg       LABS    No results found for this or any previous visit (from the past 24 hour(s)).]    I/O last 3 completed shifts:  In: 980 [P.O.:480; Other:500]  Out: 2850 [Urine:1350; Other:1500]    Vitals:    09/21/18 0444 09/21/18 0741 09/21/18 1041 09/21/18 1200   BP: 122/68 129/63 (!) 142/78    Pulse: 85 85 85    Resp: 18 18 18    Temp: 97.8 °F (36.6 °C) 98 °F (36.7 °C) 98.7 °F (37.1 °C)    TempSrc: Oral Oral Oral    SpO2: 100% 98% 99%    Weight:    59.4 kg (130 lb 15.3 oz)   Height:    5' 6" (1.676 m)       No Jvd, Thyromegaly or Lymphadenopathy  Lungs: Fairly clear anteriorly and laterally  Cor: RRR no G or rubs  Abd: Soft benign good bowel sounds non tender  Ext: No E C C    A)  Still voiding quite a bit   Dialyzed for the 1st time yesterday no s.e>  Tells me that about 4-5 y ago he had [facility] K [facility]. Pt unclear of results.  DIANE on probably ckd3v/4  Met acidosis  Anemia epo added, check iron stores/spep  Gout  Hx of hep b  Smallish kidneys on sono  Severe proteinuria >5 grams  Protein malnutrition  Thrombocytopenia  High pth   Colitis    P)  HD in am  Renal diet  Protect L arm  EPO as needed  Binders as needed, recheck po4. Not on binders ( has good uo)      " Doing well.  No bleeding or pain.  Feeling movements.  Vomited up the glucola today.  A1C drawn instead.  If completely normal, can forgo 1 hour glucose screen as she is low risk.  tdap given.

## 2021-09-28 NOTE — BRIEF OP NOTE
[facility]  Brief Operative Note     SUMMARY     Surgery Date: 2/7/2019     Surgeon(s) and Role:     * Chucho Miller MD - Primary     * Woodrow Clarke MD - Fellow    Assisting Surgeon: None    Pre-op Diagnosis:  ESRD (end stage renal disease) on dialysis [N18.6, Z99.2]    Post-op Diagnosis:  Post-Op Diagnosis Codes:     * ESRD (end stage renal disease) on dialysis [N18.6, Z99.2]    Procedure:  L radiocephalic AVF    Anesthesia: Regional    Description of the findings of the procedure: 1. Cephalic vein adequate size   2. Palpable thrill at completion of surgery    Findings/Key Components: as above    Estimated Blood Loss: 10 cc         Specimens:   Specimen (12h ago, onward)    None          Discharge Note    SUMMARY     Admit Date: 2/7/2019    Discharge Date and Time: 2/7/2019 11:37 AM    Hospital Course (synopsis of major diagnoses, care, treatment, and services provided during the course of the hospital stay): Discharged home after outpatient surgery     Final Diagnosis: Post-Op Diagnosis Codes:     * ESRD (end stage renal disease) on dialysis [N18.6, Z99.2]    Disposition: Home or Self Care    Follow Up/Patient Instructions:     Medications:  Reconciled Home Medications:      Medication List      START taking these medications    HYDROcodone-acetaminophen 5-325 mg per tablet  Commonly known as:  NORCO  Take 1 tablet by mouth every 6 (six) hours as needed for Pain.        CONTINUE taking these medications    amLODIPine 10 MG tablet  Commonly known as:  NORVASC  Take 1 tablet (10 mg total) by mouth once daily.     atorvastatin 20 MG tablet  Commonly known as:  LIPITOR  TAKE 1 TABLET(20 MG) BY MOUTH EVERY DAY     calcium citrate 200 mg (950 mg) tablet  Commonly known as:  CALCITRATE  Take 1 tablet by mouth 3 (three) times daily with meals.     metoprolol succinate 50 MG 24 hr tablet  Commonly known as:  TOPROL-XL  Take 1 tablet (50 mg total) by mouth 2 (two) times daily.     olmesartan 20 MG  tablet  Commonly known as:  BENICAR  Take 1 tablet (20 mg total) by mouth once daily.     omeprazole 40 MG capsule  Commonly known as:  PRILOSEC  TK 1 C PO QAM     VITAMIN B-12 1000 MCG tablet  Generic drug:  cyanocobalamin  Take 100 mcg by mouth once daily.          Discharge Procedure Orders   Diet general     Remove dressing in 48 hours     Activity as tolerated     Follow-up Information     Chucho Miller MD In 4 weeks.    Specialty:  Vascular Surgery  Why:  with fistula duplex  Contact information:  [facility] [address]  [facility] [id number] [phone]                  Self

## 2021-10-04 ENCOUNTER — PATIENT MESSAGE (OUTPATIENT)
Dept: ADMINISTRATIVE | Facility: HOSPITAL | Age: 61
End: 2021-10-04

## 2022-06-01 ENCOUNTER — PATIENT MESSAGE (OUTPATIENT)
Dept: ADMINISTRATIVE | Facility: HOSPITAL | Age: 62
End: 2022-06-01
Payer: OTHER GOVERNMENT

## 2023-04-19 DIAGNOSIS — Z99.2 ESRD ON HEMODIALYSIS: Primary | Chronic | ICD-10-CM

## 2023-04-19 DIAGNOSIS — N18.6 ESRD ON HEMODIALYSIS: Primary | Chronic | ICD-10-CM

## 2023-04-27 ENCOUNTER — OFFICE VISIT (OUTPATIENT)
Dept: VASCULAR SURGERY | Facility: CLINIC | Age: 63
End: 2023-04-27
Attending: SURGERY
Payer: MEDICARE

## 2023-04-27 ENCOUNTER — HOSPITAL ENCOUNTER (OUTPATIENT)
Dept: VASCULAR SURGERY | Facility: CLINIC | Age: 63
Discharge: HOME | End: 2023-04-27
Attending: SURGERY
Payer: MEDICARE

## 2023-04-27 VITALS
BODY MASS INDEX: 17.33 KG/M2 | HEIGHT: 67 IN | TEMPERATURE: 98 F | SYSTOLIC BLOOD PRESSURE: 184 MMHG | DIASTOLIC BLOOD PRESSURE: 90 MMHG | HEART RATE: 68 BPM | WEIGHT: 110.44 LBS

## 2023-04-27 DIAGNOSIS — Z99.2 ESRD ON HEMODIALYSIS: Chronic | ICD-10-CM

## 2023-04-27 DIAGNOSIS — Z99.2 ESRD ON HEMODIALYSIS: Primary | ICD-10-CM

## 2023-04-27 DIAGNOSIS — N18.6 ESRD ON HEMODIALYSIS: Primary | ICD-10-CM

## 2023-04-27 DIAGNOSIS — N18.6 ESRD ON HEMODIALYSIS: Chronic | ICD-10-CM

## 2023-04-27 PROCEDURE — 93990 PR DUPLEX HEMODIALYSIS ACCESS: ICD-10-PCS | Mod: 26,S$PBB,, | Performed by: SURGERY

## 2023-04-27 PROCEDURE — 99214 PR OFFICE/OUTPT VISIT, EST, LEVL IV, 30-39 MIN: ICD-10-PCS | Mod: S$PBB,,, | Performed by: SURGERY

## 2023-04-27 PROCEDURE — 93990 DOPPLER FLOW TESTING: CPT | Mod: 26,S$PBB,, | Performed by: SURGERY

## 2023-04-27 PROCEDURE — 99999 PR PBB SHADOW E&M-EST. PATIENT-LVL III: ICD-10-PCS | Mod: PBBFAC,,, | Performed by: SURGERY

## 2023-04-27 PROCEDURE — 93990 DOPPLER FLOW TESTING: CPT | Mod: PBBFAC | Performed by: SURGERY

## 2023-04-27 PROCEDURE — 99213 OFFICE O/P EST LOW 20 MIN: CPT | Mod: PBBFAC | Performed by: SURGERY

## 2023-04-27 PROCEDURE — 99999 PR PBB SHADOW E&M-EST. PATIENT-LVL III: CPT | Mod: PBBFAC,,, | Performed by: SURGERY

## 2023-04-27 PROCEDURE — 99214 OFFICE O/P EST MOD 30 MIN: CPT | Mod: S$PBB,,, | Performed by: SURGERY

## 2023-05-02 NOTE — PROGRESS NOTES
Duran Mccauley  4/27/2023    Lost to follow-up since 2019, but doing well overall and at HD.     Previously:    HPI:  Patient is a 63 y.o. male s/p left radiocephalic AVF creation in 2/2019.  Doing quite well.  Has been using for dialysis without issues, permacath removed earlier this month. He had recent fistulogram and is now presenting for interval follow up.    Not currently on blood thinners or antiplatelets.   No MI/stroke  Tobacco use: None  Occasional etoh.     Past Medical History:   Diagnosis Date    Dialysis patient     MWF    Eczema     Hepatitis B     treated 2010s and negative RNA load afterward    Hyperlipidemia     Hypertension      Past Surgical History:   Procedure Laterality Date    COLONOSCOPY N/A 9/14/2018    Procedure: COLONOSCOPY;  Surgeon: Viri Blair MD;  Location: [facility] ENDO;  Service: Endoscopy;  Laterality: N/A;    FISTULOGRAM Left 5/9/2019    Procedure: Fistulogram;  Surgeon: Chucho Miller MD;  Location: [facility] CATH LAB;  Service: Cardiology;  Laterality: Left;  Date 5/9/19    RENAL BIOPSY      TUNNELED VENOUS CATHETER PLACEMENT      HD cath     Family History   Problem Relation Age of Onset    Heart disease Father     Diabetes Brother      Social History     Socioeconomic History    Marital status:    Tobacco Use    Smoking status: Never    Smokeless tobacco: Never   Substance and Sexual Activity    Alcohol use: Yes     Alcohol/week: 1.0 standard drink     Types: 1 Cans of beer per week    Drug use: No    Sexual activity: Yes     Partners: Female     Current Outpatient Medications on File Prior to Visit   Medication Sig    atorvastatin (LIPITOR) 20 MG tablet Take 1 tablet (20 mg total) by mouth once daily.    calcium acetate (PHOSLO) 667 mg capsule     cyanocobalamin (VITAMIN B-12) 1000 MCG tablet Take 100 mcg by mouth once daily.    DIALYVITE 100-1 mg Tab Take 1 tablet by mouth once daily.    DESIREE-CARLITA RX 1- mg-mg-mcg Tab TK 1 T  PO ONCE D    sevelamer carbonate  (RENVELA) 800 mg Tab     telmisartan (MICARDIS) 20 MG Tab TAKE 1 TABLET BY MOUTH EVERY DAY    VITAMIN D2 50,000 unit capsule every 7 days.      No current facility-administered medications on file prior to visit.       REVIEW OF SYSTEMS:  General: negative; ENT: negative; Allergy and Immunology: negative; Hematological and Lymphatic: Negative; Endocrine: negative; Respiratory: no cough, shortness of breath, or wheezing; Cardiovascular: no chest pain or dyspnea on exertion; Gastrointestinal: no abdominal pain/back, change in bowel habits, or bloody stools; Genito-Urinary: no dysuria, trouble voiding, or hematuria; Musculoskeletal: negative  Neurological: no TIA or stroke symptoms    PHYSICAL EXAM:      Pulse: 68  Temp: 98.2 °F (36.8 °C)      General appearance:  Alert, well appearing, and in no distress   Neurological: Normal speech, no focal findings noted; CN II - XII grossly intact                Mental status:  Oriented to person, place, and time                 Neck: Supple, no significant adenopathy; thyroid is not enlarged                  No carotid bruits can be auscultated                Chest:  Clear to auscultation, no wheezes, rales or rhonchi, symmetric air entry     No use of accessory muscles             Cardiac: Normal rate and regular rhythm, S1 and S2 normal; PMI non-displaced          Abdomen: Soft, nontender, nondistended, no masses or organomegaly     No rebound tenderness noted; bowel sounds normal           Extremities:   2+ brachial and radial pulses bilaterally     No pedal edema     No ulcerations     L forearm - strong thrill throughout proximal AVF, mild mid-AVF pulsatility; mild PSA changes w/o   effacement     Lab Results:  Lab Results   Component Value Date    K 4.5 07/14/2020    K 4.1 02/07/2019    K 6.0 (H) 01/24/2019    CREATININE 6.8 (H) 07/14/2020    CREATININE 8.7 (H) 02/07/2019    CREATININE 10.4 (H) 01/24/2019     Lab Results   Component Value Date    WBC 5.27 07/14/2020    WBC  5.52 01/24/2019    WBC 18.00 (H) 11/25/2018    HCT 38.4 (L) 07/14/2020    HCT 47.8 01/24/2019    HCT 26.6 (L) 11/25/2018     (L) 07/14/2020     01/24/2019     11/25/2018     Lab Results   Component Value Date    HGBA1C 4.5 09/13/2018    HGBA1C 4.6 12/04/2015     IMAGING:  End Stage Renal Disease on Dialysis     Dialysis Access   =============     Dialysis access location:  Left Arm   Lt inflow A PSV    186 cm/s   Lt at anastomosis PSV  290 cm/s   Lt A distal anastomosis PSV    145 cm/s   Lt fistula prox PSV    454 cm/s   Lt fistula mid PSV 39 cm/s   Lt fistula mid flow volume 1,461 ml/min   Lt fistula distal PSV  45 cm/s   Lt outflow V prox PSV  53 cm/s   Lt outflow V mid PSV   450 cm/s   Lt outflow V distal PSV    101 cm/s     Impression   =========     Color flow duplex reveals a patent Radiocephalic AV fistula at the wrist. There is a visual narrowing and a PSV of 454 cm/sec noted at   the proximal end of the fistula. A second narrowing is noted in the outflow near the antecubital fossa with a velocity of 450cm/s. These   findings do suggest a focal hemodynamically significant stenosis. The volume flow at the mid forearm measures 1461 mL/min.        IMP/PLAN:  63 y.o. male with a history of renal failure requiring dialysis. He underwent left radiocephalic AVF creation at the wrist in 2/2019 and the AVF has matured well. Great proximal thrill, mild pulsatility, no issues at HD.     --Vascular ultrasound with appropriate velocities  --Return to clinic in 3 months for survaillence ultrasound      Chucho Miller MD  Vascular & Endovascular Surgery

## 2023-05-26 ENCOUNTER — PATIENT OUTREACH (OUTPATIENT)
Dept: ADMINISTRATIVE | Facility: HOSPITAL | Age: 63
End: 2023-05-26
Payer: MEDICARE

## 2023-05-26 RX ORDER — POTASSIUM CHLORIDE 20 MEQ/1
20 TABLET, EXTENDED RELEASE ORAL
COMMUNITY
Start: 2023-04-14

## 2023-05-26 RX ORDER — ATORVASTATIN CALCIUM 40 MG/1
TABLET, FILM COATED ORAL
COMMUNITY
Start: 2022-11-04 | End: 2023-11-05

## 2023-05-26 RX ORDER — CHOLECALCIFEROL (VITAMIN D3) 50 MCG
50 TABLET ORAL
COMMUNITY
Start: 2022-11-04

## 2023-05-26 RX ORDER — VALSARTAN 160 MG/1
1 TABLET ORAL DAILY
COMMUNITY
Start: 2022-11-04 | End: 2023-11-05

## 2023-05-26 RX ORDER — SEVELAMER CARBONATE 800 MG/1
1600 TABLET, FILM COATED ORAL
COMMUNITY
Start: 2023-04-07

## 2023-05-26 RX ORDER — TAMSULOSIN HYDROCHLORIDE 0.4 MG/1
CAPSULE ORAL
COMMUNITY
Start: 2022-05-05 | End: 2023-11-05

## 2023-05-26 RX ORDER — CHOLECALCIFEROL (VITAMIN D3) 50 MCG
TABLET ORAL
COMMUNITY
Start: 2022-11-04 | End: 2023-11-05

## 2023-05-26 RX ORDER — VALSARTAN 80 MG/1
2 TABLET ORAL
COMMUNITY
Start: 2022-05-13

## 2023-05-26 NOTE — PROGRESS NOTES
[facility] 1 PCP Visit Panel Report 05.22.23 - the patient reports that he is no longer seeing Dr. Cook, he has established [facility]/ the [facility] since he retired. He was informed that Dr. Cook will be roved as his primary care physician from his patient profile. He verbalized understanding.

## 2023-09-12 NOTE — Clinical Note
dry, intact, no bleeding and no hematoma.  O-T Plasty Text: The defect edges were debeveled with a #15 scalpel blade.  Given the location of the defect, shape of the defect and the proximity to free margins an O-T plasty was deemed most appropriate.  Using a sterile surgical marker, an appropriate O-T plasty was drawn incorporating the defect and placing the expected incisions within the relaxed skin tension lines where possible.    The area thus outlined was incised deep to adipose tissue with a #15 scalpel blade.  The skin margins were undermined to an appropriate distance in all directions utilizing iris scissors.
